# Patient Record
Sex: FEMALE | Race: WHITE | NOT HISPANIC OR LATINO | Employment: OTHER | URBAN - METROPOLITAN AREA
[De-identification: names, ages, dates, MRNs, and addresses within clinical notes are randomized per-mention and may not be internally consistent; named-entity substitution may affect disease eponyms.]

---

## 2017-01-03 ENCOUNTER — CARE COORDINATION (OUTPATIENT)
Dept: PULMONOLOGY | Facility: CLINIC | Age: 72
End: 2017-01-03

## 2017-01-03 NOTE — PROGRESS NOTES
Telephone Encounter: Incoming    Reason for Call: Has declined transplant and is starting hospice. Wondering if she can just stop [employer], having side effects.     Nursing Action/Patient Instruction: Discussed with Dr Cruz that stated okay to stop, no taper needed. Informed patient.    Patient Response/Questions/Concerns: Agreed.

## 2017-01-13 ENCOUNTER — CARE COORDINATION (OUTPATIENT)
Dept: CARE COORDINATION | Facility: CLINIC | Age: 72
End: 2017-01-13

## 2017-01-13 NOTE — PROGRESS NOTES
Clinic Care Coordination Contact    Chart review for care coordination status update     Per chart review [facility] closed clinic care coordination on 7/6/16 - patient is being followed by care coordination at pulmonology     Doc flowsheet updated     Lavern Cho Care Coordinator RN  [facility]  [phone]

## 2017-07-01 ENCOUNTER — HEALTH MAINTENANCE LETTER (OUTPATIENT)
Age: 72
End: 2017-07-01

## 2017-07-11 ENCOUNTER — TELEPHONE (OUTPATIENT)
Dept: PEDIATRICS | Facility: CLINIC | Age: 72
End: 2017-07-11

## 2017-07-11 NOTE — TELEPHONE ENCOUNTER
Patient calls.  Interested in parking sticker and asking how to get one-advised we will leave the form at the FD for patient to come and fill out-form at the FD, will need PCP to complete after patient does her portion.  Amy Rajput RN  Message handled by Nurse Triage.

## 2017-07-13 ENCOUNTER — TRANSFERRED RECORDS (OUTPATIENT)
Dept: HEALTH INFORMATION MANAGEMENT | Facility: CLINIC | Age: 72
End: 2017-07-13

## 2017-07-18 NOTE — TELEPHONE ENCOUNTER
Forms completed, a copy sent to the patient, original sent to the state, and a copy abstracted into the chart.    Kiara Glynn MA

## 2017-08-04 ENCOUNTER — TRANSFERRED RECORDS (OUTPATIENT)
Dept: HEALTH INFORMATION MANAGEMENT | Facility: CLINIC | Age: 72
End: 2017-08-04

## 2017-10-17 ENCOUNTER — ALLIED HEALTH/NURSE VISIT (OUTPATIENT)
Dept: NURSING | Facility: CLINIC | Age: 72
End: 2017-10-17
Payer: COMMERCIAL

## 2017-10-17 DIAGNOSIS — Z23 NEED FOR PROPHYLACTIC VACCINATION AND INOCULATION AGAINST INFLUENZA: Primary | ICD-10-CM

## 2017-10-17 PROCEDURE — 99207 ZZC NO CHARGE NURSE ONLY: CPT

## 2017-10-17 PROCEDURE — G0008 ADMIN INFLUENZA VIRUS VAC: HCPCS | Mod: GW

## 2017-10-17 PROCEDURE — 90662 IIV NO PRSV INCREASED AG IM: CPT | Mod: GW

## 2017-10-17 NOTE — MR AVS SNAPSHOT
After Visit Summary   10/17/2017    Wanda Kaur    MRN: [id number]           Patient Information     Date Of Birth          1945        Visit Information        Provider Department      10/17/2017 1:00 PM [facility] FLU CLINIC NURSE [facility]        Today's Diagnoses     Need for prophylactic vaccination and inoculation against influenza    -  1       Follow-ups after your visit        Who to contact     If you have questions or need follow up information about today's clinic visit or your schedule please contact [facility] directly at [phone].  Normal or non-critical lab and imaging results will be communicated to you by iLivehart, letter or phone within 4 business days after the clinic has received the results. If you do not hear from us within 7 days, please contact the clinic through DiBcom or phone. If you have a critical or abnormal lab result, we will notify you by phone as soon as possible.  Submit refill requests through DiBcom or call your pharmacy and they will forward the refill request to us. Please allow 3 business days for your refill to be completed.          Additional Information About Your Visit        MyChart Information     DiBcom gives you secure access to your electronic health record. If you see a primary care provider, you can also send messages to your care team and make appointments. If you have questions, please call your primary care clinic.  If you do not have a primary care provider, please call [phone] and they will assist you.        Care EveryWhere ID     This is your Care EveryWhere ID. This could be used by other organizations to access your [place] medical records  [id number]         Blood Pressure from Last 3 Encounters:   12/23/16 126/64   11/23/16 118/56   11/10/16 128/81    Weight from Last 3 Encounters:   12/23/16 158 lb 9.6 oz (71.9 kg)   12/08/16 154 lb (69.9 kg)   11/29/16 154 lb (69.9 kg)              We  Performed the Following     ADMIN INFLUENZA (For MEDICARE Patients ONLY) []     FLU VACCINE, INCREASED ANTIGEN, PRESV FREE, AGE 65+ [89134]          Today's Medication Changes          These changes are accurate as of: 10/17/17  1:05 PM.  If you have any questions, ask your nurse or doctor.               These medicines have changed or have updated prescriptions.        Dose/Directions    furosemide 20 MG tablet   Commonly known as:  LASIX   This may have changed:    - when to take this  - reasons to take this   Used for:  Bilateral edema of lower extremity        Dose:  20 mg   Take 1 tablet (20 mg) by mouth daily   Quantity:  90 tablet   Refills:  3                Primary Care Provider Office Phone # Fax #    Oscar Delatorre [phone] [address] [place] [address]        Equal Access to Services     [facility] : Irma Mcdowell, waaxda luqadaha, qaybta kaalmada eddyyakhadra, melvin jones . So [facility] [phone].    ATENCIÓN: Si habla español, tiene a hyde disposición servicios gratuitos de asistencia lingüística. Llame al [phone].    We comply with applicable federal civil rights laws and [place] laws. We do not discriminate on the basis of race, color, national origin, age, disability, sex, sexual orientation, or gender identity.            Thank you!     Thank you for choosing [facility]  for your care. Our goal is always to provide you with excellent care. Hearing back from our patients is one way we can continue to improve our services. Please take a few minutes to complete the written survey that you may receive in the mail after your visit with us. Thank you!             Your Updated Medication List - Protect others around you: Learn how to safely use, store and throw away your medicines at www.disposemymeds.org.          This list is accurate as of: 10/17/17  1:05 PM.  Always use your most recent med  list.                   Brand Name Dispense Instructions for use Diagnosis    albuterol 108 (90 BASE) MCG/ACT Inhaler    PROAIR HFA/PROVENTIL HFA/VENTOLIN HFA    1 Inhaler    Inhale 2 puffs into the lungs every 6 hours as needed for shortness of breath / dyspnea or wheezing    ILD (interstitial lung disease) (H)       benzonatate 200 MG capsule    TESSALON    90 capsule    Take 1 capsule (200 mg) by mouth 3 times daily as needed for cough    Cough       cetirizine 10 MG tablet    zyrTEC     Take 10 mg by mouth daily        CYANOCOBALAMIN SL      Place under the tongue daily        furosemide 20 MG tablet    LASIX    90 tablet    Take 1 tablet (20 mg) by mouth daily    Bilateral edema of lower extremity       GABAPENTIN PO           hydrocortisone 2.5 % cream    ANUSOL-HC    30 g    Place rectally 2 times daily    External hemorrhoids       HYDROmorphone (STANDARD CONC) 1 MG/ML Liqd liquid    DILAUDID    180 mL    Take 1-2 mLs (1-2 mg) by mouth every 3 hours as needed for other (dyspnea)    ILD (interstitial lung disease) (H), Acute respiratory failure with hypoxia (H)       ibuprofen 200 MG tablet    ADVIL/MOTRIN     Take 400 mg by mouth every 4 hours as needed for mild pain        ipratropium - albuterol 0.5 mg/2.5 mg/3 mL 0.5-2.5 (3) MG/3ML neb solution    DUONEB    90 vial    Take 1 vial (3 mLs) by nebulization every 4 hours as needed for shortness of breath / dyspnea or wheezing    Mild intermittent asthma with acute exacerbation       mycophenolate 500 MG tablet    GENERIC EQUIVALENT    180 tablet    TAKE 3 TABLETS(1500 MG) BY MOUTH TWICE DAILY    ILD (interstitial lung disease) (H)       nebulizer/tubing/mouthpiece Kit     1 kit    Dispense all necessary supplies for nebulizer machine    Mild intermittent asthma       ondansetron 4 MG ODT tab    ZOFRAN ODT    20 tablet    Take 1-2 tablets (4-8 mg) by mouth every 8 hours as needed for nausea    Nausea       order for DME     1 Device    Please provide patient  with liquid oxygen with home fill system.  Patient requires 6 liters oxygen per minute via nasal canula with activity.  Please provide patient with oximyzer. This is for lifetime use.    ILD (interstitial lung disease) (H)       oseltamivir 75 MG capsule    TAMIFLU    10 capsule    Take 1 capsule (75 mg) by mouth daily    ILD (interstitial lung disease) (H), Immunosuppression (H)       predniSONE 1 MG tablet    DELTASONE    120 tablet    Take 4 tablets daily with 10 mg for total of 14 mg daily for 2 weeks, then 13 mg daily for 2 weeks, then 12 mg daily for 2 weeks, 11 mg daily for 2 weeks, then 10 mg daily.    ILD (interstitial lung disease) (H)       ranitidine 150 MG tablet    ZANTAC    60 tablet    Take 1 tablet (150 mg) by mouth 2 times daily    GERD (gastroesophageal reflux disease)       RiTUXimab 500 MG/50ML Soln       ILD (interstitial lung disease) (H)       sulfamethoxazole-trimethoprim 400-80 MG per tablet    BACTRIM/SEPTRA    90 tablet    Take 1 tablet by mouth daily    Need for pneumocystis prophylaxis       VITAMIN D-400 PO      Takes 2 gummies daily - 800 units

## 2018-03-12 ENCOUNTER — TRANSFERRED RECORDS (OUTPATIENT)
Dept: HEALTH INFORMATION MANAGEMENT | Facility: CLINIC | Age: 73
End: 2018-03-12

## 2018-03-13 ENCOUNTER — THERAPY VISIT (OUTPATIENT)
Dept: PHYSICAL THERAPY | Facility: CLINIC | Age: 73
End: 2018-03-13
Payer: COMMERCIAL

## 2018-03-13 DIAGNOSIS — M54.12 CERVICAL RADICULOPATHY: Primary | ICD-10-CM

## 2018-03-13 DIAGNOSIS — M54.2 CERVICAL PAIN: ICD-10-CM

## 2018-03-13 PROCEDURE — 97162 PT EVAL MOD COMPLEX 30 MIN: CPT | Mod: GP | Performed by: PHYSICAL THERAPIST

## 2018-03-13 PROCEDURE — G8982 BODY POS GOAL STATUS: HCPCS | Mod: GP | Performed by: PHYSICAL THERAPIST

## 2018-03-13 PROCEDURE — 97110 THERAPEUTIC EXERCISES: CPT | Mod: GP | Performed by: PHYSICAL THERAPIST

## 2018-03-13 PROCEDURE — G8981 BODY POS CURRENT STATUS: HCPCS | Mod: GP | Performed by: PHYSICAL THERAPIST

## 2018-03-13 NOTE — PROGRESS NOTES
[facility] [facility] Initial Evaluation  Subjective:  HPI                    Objective:  System    Physical Exam    [facility]: Physical Therapy Initial Evaluation   Mar 13, 2018  Therapist Impression: Ella is a 72-year old female who presents to physical therapy with a primary complaint of pain in the right upper back/shoulder blade that radiates down the back of her arm all the way to her pinky finger. She also recently notes weakness in the right hand - specifically with pinching. She also reports a correlation between symptoms and sitting in her recliner chair which she feels pushes her head forward. Clinical findings include loss of cervical ROM, poor posture, weakness of the right upper extremity including with  strength in her right (dominant) hand, and tenderness of the cervical musculature that reproduced symptoms in the right upper extremity on palpation. Differential diagnoses at this time include cervical radiculopathy, thoracic outlet syndrome, and T4 syndrome. Ella would benefit from physical therapy to address postural deficits, reduce pain, and restore strength of the right upper extremity.     Subjective:   Chief Complaint:    Pain: Start in the right shoulder blade region and goes down the back/outside of the right arm all the way into the pinky   Numbness/Tingling: Maybe a little bit in the pinky finger   Weakness: in the right hand   Stiffness: None  New/Recurrent/Chronic: New  DOI/onset: Back pain started a couple weeks ago; Pain down the arm started last Friday   Referral Date: 3/12/2018  Mechanism of onset: Nothing in particular, maybe from helping change the bed linen  PMH/surgical history/trauma:    - Pulmonary Fibrosis (past 2 years, O2 dependent)   - asthma   - rheumatoid arthritis (in the hands)   - Fusion C6/7 (about 12 years ago)    - Bilateral total shoulder replacements  General health as reported by patient: Fair    Medications:  anti-inflammatory, pain, albuterol inhaler  Occupation: Retired RN Job duties: prolonged sitting,   Previous Treatment (Effect): Gabapentin (just started yesterday), ibuprofen (helps)  Imaging: X-ray: Irritated below C6/7  AM/PM: same all day  Quality of Pain: deep ache, burning  Pain: 4/10 at present, 4/10 at best, 9/10 at worst  Better: Holding the arm in the right way, medications, pressure on her back,   Worse: extension of the neck, reaching,   Progression of Symptoms since onset: getting worse  Sleeping: This morning was the first morning it woke her up a few hours early.    Other current functional challenges: holding/gripping, reaching, looking up  Current Functional Status: gripping - weakening , writing is hard, can't butter toast ; reaching - can't reach due to pain (forward, upwards); looking up - pain with neck extension    Previous Functional Status: No restrictions previosuly  Current HEP/exercise regimen: Pretty restricted with the oxygen, tries to walk a bit  Hand/Leg Dominance: right-handed  Transportation: Hasn't driven since this started, but doesn't drive much  Live with Others:  at home and is able to help  Red Flags:   - Patient denies the following: Fever ; Change in Bowel or Bladder ; Chest Pain ; Unexplained Weight Loss ;   - Patient reports the following: Pain with Cough / Sneeze / Laughing (in the back and shooting down the arm) ; Night Pain ; Weakness ; Numbness/Tingling ;     Patient's Goal(s): Get rid of this pain    Objective:    Posture: Forward head posutre, rounded shoulders    Neurological:    Motor Deficit:  Myotomes R L   C5 (shoulder abduction) 5 5   C6 (elbow flexion) 5 5   C7 (elbow extension) 4 5   C8 (thumb extension) 3 5   T1 (finger add/abd) 4 5     Sensory Deficit: Sensation intact and symmetrical  Reflexes: Biceps 2+ bilat ;  Triceps R 0+, L 2+ ; Brachioradialis 2+ bilat  Dural Signs: Negative Arango's     Strength (lb) R L   Attempt #1 25 40   Attempt  #2 25 32   Attempt #3 25 30   Average 25 34         AROM: (Major, Moderate, Minimal or Nil loss)  Movement Loss Denny Mod Min Nil Pain   Flexion    x    Extension x    CC   Left Rotation x x      Right Rotation  x      Left Side Bending  x      Right Side bending  x      Retraction  x x         Other:   - Tenderness with symptoms down the right arm during palpation of the right upper trapezius/scalene region.   - Symptoms improved with mobilization of the first rib.       Assessment/Plan:    Patient is a 72 year old female with cervical, thoracic and right upper extremity complaints.    Patient has the following significant findings with corresponding treatment plan.                Referring Diagnosis 1: Cervical spine pain, right cervical radiculopathy   Pain -  hot/cold therapy, manual therapy, splint/taping/bracing/orthotics, self management, education, directional preference exercise and home program  Decreased ROM/flexibility - manual therapy, therapeutic exercise, therapeutic activity and home program  Decreased joint mobility - manual therapy, therapeutic exercise, therapeutic activity and home program  Decreased strength - therapeutic exercise, therapeutic activities and home program  Decreased function - therapeutic activities and home program  Impaired posture - neuro re-education, therapeutic activities and home program    Therapy Evaluation Codes:   1) History comprised of:   Personal factors that impact the plan of care:      Past/current experiences.    Comorbidity factors that impact the plan of care are:      Rheumatoid arthritis and Pulmonary Condition (O2 depoendent), Bilateral Shoulder Replacement, and Cervical Fusion.     Medications impacting care: Anti-inflammatory and Pain.  2) Examination of Body Systems comprised of:   Body structures and functions that impact the plan of care:      Cervical spine, Shoulder, Thoracic Spine and right upper extremity.   Activity limitations that impact the plan  of care are:      Grasping, Sleeping and Reaching and Looking Up.  3) Clinical presentation characteristics are:   Evolving/Changing.  4) Decision-Making    Moderate complexity using standardized patient assessment instrument and/or measureable assessment of functional outcome.  Cumulative Therapy Evaluation is: Moderate complexity.    Previous and current functional limitations:  (See Goal Flow Sheet for this information)    Short term and Long term goals: (See Goal Flow Sheet for this information)     Communication ability:  Patient appears to be able to clearly communicate and understand verbal and written communication and follow directions correctly.  Treatment Explanation - The following has been discussed with the patient:   RX ordered/plan of care  Anticipated outcomes  Possible risks and side effects  This patient would benefit from PT intervention to resume normal activities.   Rehab potential is fair.    Frequency:  1 X week, once daily  Duration:  for 4 weeks tapering to 2 X a month over 4 weeks  Discharge Plan:  Achieve all LTG.  Independent in home treatment program.  Reach maximal therapeutic benefit.    Please refer to the daily flowsheet for treatment today, total treatment time and time spent performing 1:1 timed codes.

## 2018-03-13 NOTE — LETTER
NAYELY IRENE ZHANE PT  [address] [address]   [address]  [place] [address]  [phone]    2018    Re: Wanda Kaur   :   1945  MRN:  [id number]   REFERRING PHYSICIAN:   Sesar IRENE BURNSIVON PT    Date of Initial Evaluation:  3/13/2018  Visits:  Rxs Used: 1  Reason for Referral:     Cervical radiculopathy  Cervical pain    [place] [facility]: Physical Therapy Initial Evaluation   Mar 13, 2018  Therapist Impression: Ella is a 72-year old female who presents to physical therapy with a primary complaint of pain in the right upper back/shoulder blade that radiates down the back of her arm all the way to her pinky finger. She also recently notes weakness in the right hand - specifically with pinching. She also reports a correlation between symptoms and sitting in her recliner chair which she feels pushes her head forward. Clinical findings include loss of cervical ROM, poor posture, weakness of the right upper extremity including with  strength in her right (dominant) hand, and tenderness of the cervical musculature that reproduced symptoms in the right upper extremity on palpation. Differential diagnoses at this time include cervical radiculopathy, thoracic outlet syndrome, and T4 syndrome. Ella would benefit from physical therapy to address postural deficits, reduce pain, and restore strength of the right upper extremity.     Subjective:   Chief Complaint:    Pain: Start in the right shoulder blade region and goes down the back/outside of the right arm all the way into the pinky   Numbness/Tingling: Maybe a little bit in the pinky finger   Weakness: in the right hand   Stiffness: None  New/Recurrent/Chronic: New  DOI/onset: Back pain started a couple weeks ago; Pain down the arm started last Friday   Referral Date: 3/12/2018  Mechanism of onset: Nothing in particular, maybe from helping change the bed linen    Re: Wanda Kaur   :    1945  PMH/surgical history/trauma:    - Pulmonary Fibrosis (past 2 years, O2 dependent)   - asthma   - rheumatoid arthritis (in the hands)   - Fusion C6/7 (about 12 years ago)    - Bilateral total shoulder replacements  General health as reported by patient: Fair    Medications: anti-inflammatory, pain, albuterol inhaler  Occupation: Retired RN Job duties: prolonged sitting,   Previous Treatment (Effect): Gabapentin (just started yesterday), ibuprofen (helps)  Imaging: X-ray: Irritated below C6/7  AM/PM: same all day  Quality of Pain: deep ache, burning  Pain: 4/10 at present, 4/10 at best, 9/10 at worst  Better: Holding the arm in the right way, medications, pressure on her back,   Worse: extension of the neck, reaching,   Progression of Symptoms since onset: getting worse  Sleeping: This morning was the first morning it woke her up a few hours early.    Other current functional challenges: holding/gripping, reaching, looking up  Current Functional Status: gripping - weakening , writing is hard, can't butter toast ; reaching - can't reach due to pain (forward, upwards); looking up - pain with neck extension    Previous Functional Status: No restrictions previosuly  Current HEP/exercise regimen: Pretty restricted with the oxygen, tries to walk a bit  Hand/Leg Dominance: right-handed  Transportation: Hasn't driven since this started, but doesn't drive much  Live with Others:  at home and is able to help  Red Flags:   - Patient denies the following: Fever ; Change in Bowel or Bladder ; Chest Pain ; Unexplained Weight Loss ;   - Patient reports the following: Pain with Cough / Sneeze / Laughing (in the back and shooting down the arm) ; Night Pain ; Weakness ; Numbness/Tingling ;     Patient's Goal(s): Get rid of this pain    Re: Wanda Kaur   :   1945    Objective:    Posture: Forward head posutre, rounded shoulders    Neurological:    Motor Deficit:  Myotomes R L   C5 (shoulder  abduction) 5 5   C6 (elbow flexion) 5 5   C7 (elbow extension) 4 5   C8 (thumb extension) 3 5   T1 (finger add/abd) 4 5     Sensory Deficit: Sensation intact and symmetrical  Reflexes: Biceps 2+ bilat ;  Triceps R 0+, L 2+ ; Brachioradialis 2+ bilat  Dural Signs: Negative Arango's     Strength (lb) R L   Attempt #1 25 40   Attempt #2 25 32   Attempt #3 25 30   Average 25 34         AROM: (Major, Moderate, Minimal or Nil loss)  Movement Loss Denny Mod Min Nil Pain   Flexion    x    Extension x    CC   Left Rotation x x      Right Rotation  x      Left Side Bending  x      Right Side bending  x      Retraction  x x       Other:   - Tenderness with symptoms down the right arm during palpation of the right upper trapezius/scalene region.   - Symptoms improved with mobilization of the first rib.     Assessment/Plan:    Patient is a 72 year old female with cervical, thoracic and right upper extremity complaints.    Patient has the following significant findings with corresponding treatment plan.                Referring Diagnosis 1: Cervical spine pain, right cervical radiculopathy   Pain -  hot/cold therapy, manual therapy, splint/taping/bracing/orthotics, self management, education, directional preference exercise and home program  Decreased ROM/flexibility - manual therapy, therapeutic exercise, therapeutic activity and home program  Decreased joint mobility - manual therapy, therapeutic exercise, therapeutic activity and home program  Decreased strength - therapeutic exercise, therapeutic activities and home program  Decreased function - therapeutic activities and home program  Impaired posture - neuro re-education, therapeutic activities and home program  Re: Wanda Kaur   :   1945    Therapy Evaluation Codes:   1) History comprised of:   Personal factors that impact the plan of care:      Past/current experiences.    Comorbidity factors that impact the plan of care are:      Rheumatoid arthritis  and Pulmonary Condition (O2 depoendent), Bilateral Shoulder Replacement, and Cervical Fusion.     Medications impacting care: Anti-inflammatory and Pain.  2) Examination of Body Systems comprised of:   Body structures and functions that impact the plan of care:      Cervical spine, Shoulder, Thoracic Spine and right upper extremity.   Activity limitations that impact the plan of care are:      Grasping, Sleeping and Reaching and Looking Up.  3) Clinical presentation characteristics are:   Evolving/Changing.  4) Decision-Making    Moderate complexity using standardized patient assessment instrument and/or measureable assessment of functional outcome.  Cumulative Therapy Evaluation is: Moderate complexity.    Previous and current functional limitations:  (See Goal Flow Sheet for this information)    Short term and Long term goals: (See Goal Flow Sheet for this information)     Communication ability:  Patient appears to be able to clearly communicate and understand verbal and written communication and follow directions correctly.  Treatment Explanation - The following has been discussed with the patient:   RX ordered/plan of care  Anticipated outcomes  Possible risks and side effects  This patient would benefit from PT intervention to resume normal activities.   Rehab potential is fair.    Frequency:  1 X week, once daily  Duration:  for 4 weeks tapering to 2 X a month over 4 weeks  Discharge Plan:  Achieve all LTG.  Independent in home treatment program.  Reach maximal therapeutic benefit.    Thank you for your referral.    INQUIRIES  Therapist: SONY Durham [facility] PT  [address]   [address] [place] [id number]  Phone: [phone]  Fax: [phone]

## 2018-03-15 ENCOUNTER — TRANSFERRED RECORDS (OUTPATIENT)
Dept: HEALTH INFORMATION MANAGEMENT | Facility: CLINIC | Age: 73
End: 2018-03-15

## 2018-03-20 ENCOUNTER — THERAPY VISIT (OUTPATIENT)
Dept: PHYSICAL THERAPY | Facility: CLINIC | Age: 73
End: 2018-03-20
Payer: COMMERCIAL

## 2018-03-20 DIAGNOSIS — M54.12 CERVICAL RADICULOPATHY: ICD-10-CM

## 2018-03-20 DIAGNOSIS — M54.2 CERVICAL PAIN: ICD-10-CM

## 2018-03-20 PROCEDURE — 97140 MANUAL THERAPY 1/> REGIONS: CPT | Mod: GP | Performed by: PHYSICAL THERAPIST

## 2018-03-20 PROCEDURE — 97110 THERAPEUTIC EXERCISES: CPT | Mod: GP | Performed by: PHYSICAL THERAPIST

## 2018-03-20 NOTE — PROGRESS NOTES
At start of treatment     Strength (lb) R L   Attempt #1 15    Attempt #2 15    Attempt #3 15    Average 15        After Treatment     Strength (lb) R L   Attempt #1 21    Attempt #2 21    Attempt #3 22    Average 21.7

## 2018-03-23 ENCOUNTER — THERAPY VISIT (OUTPATIENT)
Dept: PHYSICAL THERAPY | Facility: CLINIC | Age: 73
End: 2018-03-23
Payer: COMMERCIAL

## 2018-03-23 DIAGNOSIS — M54.12 CERVICAL RADICULOPATHY: ICD-10-CM

## 2018-03-23 DIAGNOSIS — M54.2 CERVICAL PAIN: ICD-10-CM

## 2018-03-23 PROCEDURE — 97110 THERAPEUTIC EXERCISES: CPT | Mod: GP | Performed by: PHYSICAL THERAPIST

## 2018-03-23 PROCEDURE — 97140 MANUAL THERAPY 1/> REGIONS: CPT | Mod: GP | Performed by: PHYSICAL THERAPIST

## 2018-03-27 ENCOUNTER — THERAPY VISIT (OUTPATIENT)
Dept: PHYSICAL THERAPY | Facility: CLINIC | Age: 73
End: 2018-03-27
Payer: COMMERCIAL

## 2018-03-27 DIAGNOSIS — M54.12 CERVICAL RADICULOPATHY: ICD-10-CM

## 2018-03-27 DIAGNOSIS — M54.2 CERVICAL PAIN: ICD-10-CM

## 2018-03-27 PROCEDURE — 97110 THERAPEUTIC EXERCISES: CPT | Mod: GP | Performed by: PHYSICAL THERAPIST

## 2018-03-27 PROCEDURE — 97112 NEUROMUSCULAR REEDUCATION: CPT | Mod: GP | Performed by: PHYSICAL THERAPIST

## 2018-03-27 PROCEDURE — 97140 MANUAL THERAPY 1/> REGIONS: CPT | Mod: GP | Performed by: PHYSICAL THERAPIST

## 2018-03-27 NOTE — PROGRESS NOTES
Start of treatment      Strength (lb) R L   Attempt #1 15    Attempt #2 17    Attempt #3 15    Average 15.6        End of treatment   Strength (lb) R L   Attempt #1 19    Attempt #2 19    Attempt #3 20    Average 19.3        Motor Deficit:  Myotomes R L   C5 (shoulder abduction)     C6 (elbow flexion)     C7 (elbow extension) 5/5    C8 (thumb extension) 3/5    T1 (finger add/abd) 3/5 at digits 4-5

## 2018-03-28 NOTE — PROGRESS NOTES
Start of treatment     Strength (lb) R L   Attempt #1 15    Attempt #2 21    Attempt #3 22    Average 19.3        End of treatment     Strength (lb) R L   Attempt #1 21    Attempt #2 21    Attempt #3 23    Average 21.6      Small improvement over  strength at last session.

## 2018-03-30 ENCOUNTER — THERAPY VISIT (OUTPATIENT)
Dept: PHYSICAL THERAPY | Facility: CLINIC | Age: 73
End: 2018-03-30
Payer: MEDICARE

## 2018-03-30 DIAGNOSIS — M54.2 CERVICAL PAIN: ICD-10-CM

## 2018-03-30 DIAGNOSIS — M54.12 CERVICAL RADICULOPATHY: ICD-10-CM

## 2018-03-30 PROCEDURE — 97140 MANUAL THERAPY 1/> REGIONS: CPT | Mod: GP | Performed by: PHYSICAL THERAPIST

## 2018-03-30 PROCEDURE — 97112 NEUROMUSCULAR REEDUCATION: CPT | Mod: GP | Performed by: PHYSICAL THERAPIST

## 2018-03-30 NOTE — LETTER
NAYELY IRENE ZHANE PT  [address] [address]  [address]  [facility] [address]  [phone]    2018    Re: Wanda Kaur   :   1945  MRN:  [id number]   REFERRING PHYSICIAN:   Sesar IRENE ZHANE PT    Date of Initial Evaluation:  3/13/2018  Visits:  Rxs Used: 5  Reason for Referral:     Cervical radiculopathy  Cervical pain    PROGRESS  REPORT    Progress reporting period is from 3/13/2018 to 3/30/2018.       SUBJECTIVE  Subjective changes noted by patient: Feeling better. Mild discomfort the last few days. Has a small twinge in the right elbow presently.     Current Pain level: 2/10.     Initial Pain level: 9/10.   Changes in function:  None  Adverse reaction to treatment or activity: None  OBJECTIVE  Changes noted in objective findings:  Yes, Patient had a decrease in  strength that is returning. She also demonstrates improved strength in other myotomes over the course of treatment. The weakness remains primarily in the hands.    Strength (lb) R L   Attempt #1 25    Attempt #2 25    Attempt #3 23    Average 24.3    Elbow Extension on the R: 4+/5  Tension in the upper trap and nasra-scapular musculature.     ASSESSMENT/PLAN  Updated problem list and treatment plan:   Referring Diagnosis 1: Cervical spine pain, right cervical radiculopathy   Pain -  hot/cold therapy, manual therapy, splint/taping/bracing/orthotics, self management, education, directional preference exercise and home program  Decreased ROM/flexibility - manual therapy, therapeutic exercise, therapeutic activity and home program  Decreased joint mobility - manual therapy, therapeutic exercise, therapeutic activity and home program  Decreased strength - therapeutic exercise, therapeutic activities and home program  Decreased function - therapeutic activities and home program  Impaired posture - neuro re-education, therapeutic activities and home program  STG/LTGs have been met or progress has been made  towards goals:  Yes (See Goal flow sheet completed today.)    Re: Wanda Kaur   :   1945    Assessment of Progress: The patient's condition is improving.  The patient's condition has potential to improve.  Self Management Plans:  Patient has been instructed in a home treatment program.  I have re-evaluated this patient and find that the nature, scope, duration and intensity of the therapy is appropriate for the medical condition of the patient.  Wanda continues to require the following intervention to meet STG and LTG's:  PT or other intervention    Recommendations:  Ella has made some small but steady improvements with physical therapy. She continues to have a primary deficit of weakness in the hand.   Defer judgement to Dr. Camejo on best course of action going forward based on patient presentation and reporting on utility of PT compared against other treatment options.   If continued PT is the recommendation, please send new orders.       Thank you for your referral.    INQUIRIES  Therapist: SONY Durham [facility]  [address] [address] [address]  Phone: [phone]  Fax: [phone]

## 2018-04-01 NOTE — PROGRESS NOTES
Subjective:  HPI                    Objective:  System    Physical Exam    General     ROS     Assessment/Plan:    PROGRESS  REPORT    Progress reporting period is from 3/13/2018 to 3/30/2018.       SUBJECTIVE  Subjective changes noted by patient: Feeling better. Mild discomfort the last few days. Has a small twinge in the right elbow presently.     Current Pain level: 2/10.     Initial Pain level: 9/10.   Changes in function:  None  Adverse reaction to treatment or activity: None    OBJECTIVE  Changes noted in objective findings:  Yes, Patient had a decrease in  strength that is returning. She also demonstrates improved strength in other myotomes over the course of treatment. The weakness remains primarily in the hands.      Strength (lb) R L   Attempt #1 25    Attempt #2 25    Attempt #3 23    Average 24.3      Elbow Extension on the R: 4+/5    Tension in the upper trap and nasra-scapular musculature.     ASSESSMENT/PLAN  Updated problem list and treatment plan:   Referring Diagnosis 1: Cervical spine pain, right cervical radiculopathy   Pain -  hot/cold therapy, manual therapy, splint/taping/bracing/orthotics, self management, education, directional preference exercise and home program  Decreased ROM/flexibility - manual therapy, therapeutic exercise, therapeutic activity and home program  Decreased joint mobility - manual therapy, therapeutic exercise, therapeutic activity and home program  Decreased strength - therapeutic exercise, therapeutic activities and home program  Decreased function - therapeutic activities and home program  Impaired posture - neuro re-education, therapeutic activities and home program  STG/LTGs have been met or progress has been made towards goals:  Yes (See Goal flow sheet completed today.)  Assessment of Progress: The patient's condition is improving.  The patient's condition has potential to improve.  Self Management Plans:  Patient has been instructed in a home treatment  program.  I have re-evaluated this patient and find that the nature, scope, duration and intensity of the therapy is appropriate for the medical condition of the patient.  Wanda continues to require the following intervention to meet STG and LTG's:  PT or other intervention    Recommendations:  Ella has made some small but steady improvements with physical therapy. She continues to have a primary deficit of weakness in the hand.   Defer judgement to Dr. Camejo on best course of action going forward based on patient presentation and reporting on utility of PT compared against other treatment options.   If continued PT is the recommendation, please send new orders.     Please refer to the daily flowsheet for treatment today, total treatment time and time spent performing 1:1 timed codes.

## 2018-04-03 ENCOUNTER — TRANSFERRED RECORDS (OUTPATIENT)
Dept: HEALTH INFORMATION MANAGEMENT | Facility: CLINIC | Age: 73
End: 2018-04-03

## 2018-04-04 ENCOUNTER — TELEPHONE (OUTPATIENT)
Dept: PEDIATRICS | Facility: CLINIC | Age: 73
End: 2018-04-04

## 2018-04-04 ENCOUNTER — RADIANT APPOINTMENT (OUTPATIENT)
Dept: GENERAL RADIOLOGY | Facility: CLINIC | Age: 73
End: 2018-04-04
Attending: INTERNAL MEDICINE
Payer: MEDICARE

## 2018-04-04 ENCOUNTER — OFFICE VISIT (OUTPATIENT)
Dept: PEDIATRICS | Facility: CLINIC | Age: 73
End: 2018-04-04
Payer: COMMERCIAL

## 2018-04-04 VITALS
BODY MASS INDEX: 29.43 KG/M2 | SYSTOLIC BLOOD PRESSURE: 122 MMHG | HEART RATE: 98 BPM | DIASTOLIC BLOOD PRESSURE: 58 MMHG | TEMPERATURE: 98.1 F | OXYGEN SATURATION: 96 % | WEIGHT: 146 LBS | HEIGHT: 59 IN

## 2018-04-04 DIAGNOSIS — J84.9 ILD (INTERSTITIAL LUNG DISEASE) (H): ICD-10-CM

## 2018-04-04 DIAGNOSIS — Z01.818 PREOP GENERAL PHYSICAL EXAM: Primary | ICD-10-CM

## 2018-04-04 DIAGNOSIS — M54.12 CERVICAL RADICULOPATHY: ICD-10-CM

## 2018-04-04 LAB
BASOPHILS # BLD AUTO: 0 10E9/L (ref 0–0.2)
BASOPHILS NFR BLD AUTO: 0.5 %
DIFFERENTIAL METHOD BLD: ABNORMAL
EOSINOPHIL # BLD AUTO: 0.2 10E9/L (ref 0–0.7)
EOSINOPHIL NFR BLD AUTO: 2.8 %
ERYTHROCYTE [DISTWIDTH] IN BLOOD BY AUTOMATED COUNT: 12.3 % (ref 10–15)
FEF 25/75: NORMAL
FEV-1: NORMAL
FEV1/FVC: NORMAL
FVC: NORMAL
HCT VFR BLD AUTO: 35 % (ref 35–47)
HGB BLD-MCNC: 11 G/DL (ref 11.7–15.7)
LYMPHOCYTES # BLD AUTO: 0.7 10E9/L (ref 0.8–5.3)
LYMPHOCYTES NFR BLD AUTO: 10.8 %
MCH RBC QN AUTO: 31.7 PG (ref 26.5–33)
MCHC RBC AUTO-ENTMCNC: 31.4 G/DL (ref 31.5–36.5)
MCV RBC AUTO: 101 FL (ref 78–100)
MONOCYTES # BLD AUTO: 0.5 10E9/L (ref 0–1.3)
MONOCYTES NFR BLD AUTO: 7.5 %
NEUTROPHILS # BLD AUTO: 5.1 10E9/L (ref 1.6–8.3)
NEUTROPHILS NFR BLD AUTO: 78.4 %
PLATELET # BLD AUTO: 291 10E9/L (ref 150–450)
RBC # BLD AUTO: 3.47 10E12/L (ref 3.8–5.2)
WBC # BLD AUTO: 6.5 10E9/L (ref 4–11)

## 2018-04-04 PROCEDURE — 71046 X-RAY EXAM CHEST 2 VIEWS: CPT | Mod: GV

## 2018-04-04 PROCEDURE — 94010 BREATHING CAPACITY TEST: CPT | Mod: GV | Performed by: PATHOLOGY

## 2018-04-04 PROCEDURE — 36415 COLL VENOUS BLD VENIPUNCTURE: CPT | Mod: GV | Performed by: INTERNAL MEDICINE

## 2018-04-04 PROCEDURE — 99215 OFFICE O/P EST HI 40 MIN: CPT | Mod: 25 | Performed by: PATHOLOGY

## 2018-04-04 PROCEDURE — 93000 ELECTROCARDIOGRAM COMPLETE: CPT | Mod: GV | Performed by: PATHOLOGY

## 2018-04-04 PROCEDURE — 85025 COMPLETE CBC W/AUTO DIFF WBC: CPT | Mod: GV | Performed by: PATHOLOGY

## 2018-04-04 PROCEDURE — 80053 COMPREHEN METABOLIC PANEL: CPT | Mod: GV | Performed by: PATHOLOGY

## 2018-04-04 RX ORDER — GABAPENTIN 300 MG/1
600 CAPSULE ORAL 2 TIMES DAILY
COMMUNITY
Start: 2018-04-04 | End: 2018-05-02

## 2018-04-04 NOTE — TELEPHONE ENCOUNTER
After huddling with , called pt back & offered an appointment with him for pre-op(complicated hx) either today or tomorrow. Pt would like to come in today, scheduled that appointment.    Matti RN  Triage Nurse

## 2018-04-04 NOTE — PROGRESS NOTES
[facility]  [address]  [address]  [place] [address] [phone]  Dept: [phone]    PRE-OP EVALUATION:  Today's date: 2018    Wanda Kaur (: 1945) presents for pre-operative evaluation assessment as requested by Dr. Camejo.  She requires evaluation and anesthesia risk assessment prior to undergoing surgery/procedure for treatment of neck .    Fax number for surgical facility: [facility]   Primary Physician: sOcar Delatorre  Type of Anesthesia Anticipated: General    Patient has a Health Care Directive or Living Will:  YES     Preop Questions 2018   Who is doing your surgery? dr yokasta camejo   What are you having done? cervical neck for impinged nerve   Date of Surgery/Procedure: 18   1.  Do you have a history of Heart attack, stroke, stent, coronary bypass surgery, or other heart surgery? No   2.  Do you ever have any pain or discomfort in your chest? No   3.  Do you have a history of  Heart Failure? No   4.   Are you troubled by shortness of breath when:  walking on a level surface, or up a slight hill, or at night? YES - has interstitial lung disease   5.  Do you currently have a cold, bronchitis or other respiratory infection? No   6.  Do you have a cough, shortness of breath, or wheezing? YES - see below   7.  Do you sometimes get pains in the calves of your legs when you walk? No   8. Do you or anyone in your family have previous history of blood clots? No   9.  Do you or does anyone in your family have a serious bleeding problem such as prolonged bleeding following surgeries or cuts? No   10. Have you ever had problems with anemia or been told to take iron pills? No   11. Have you had any abnormal blood loss such as black, tarry or bloody stools, or abnormal vaginal bleeding? No   12. Have you ever had a blood transfusion? YES -    13. Have you or any of your relatives ever had problems with anesthesia? No   14. Do you have  sleep apnea, excessive snoring or daytime drowsiness? No   15. Do you have any prosthetic heart valves? No   16. Do you have prosthetic joints? YES - shoulders   17. Is there any chance that you may be pregnant? No         HPI:     HPI related to upcoming procedure: Ella is a 72 yr old female with history of intersitial lung disease, cervical radiculopathy, rheumatoid arthritis who presents for pre-operative exam prior to cervical foraminotomy. She has been having numbness and weakness in her right hand.    ILD: she has had interestitial lung disease since 2016. She has bee on 4 L of oxygen therapy at home. Oxygen will drop down to mid 80'% with exertion on 3L but will stay above 90% on 4L. She feels that her breathing has been stable. No increased wheezing or shortness of breath. Will occasionally use morphine if having shortness of breath. Uses albuterol 2-3 times per day with some help. Had been evaluated by pulmonology and treated with rituximab and cell cept. She has been off all medications and feels that things are stable.     She is not on immunosuppression therapy for rheumatoid arthritis    MEDICAL HISTORY:     Patient Active Problem List    Diagnosis Date Noted     Cervical radiculopathy 03/13/2018     Priority: Medium     Cervical pain 03/13/2018     Priority: Medium     Status post coronary angiogram 09/14/2016     Priority: Medium     Abnormal CT of the chest 09/06/2016     Priority: Medium     Sick-euthyroid syndrome 08/03/2016     Priority: Medium     Anemia, unspecified type 08/03/2016     Priority: Medium     ILD (interstitial lung disease) (H) 07/08/2016     Priority: Medium     Acute respiratory failure with hypoxia (H) 06/27/2016     Priority: Medium     Pneumonia of both lungs due to methicillin resistant Staphylococcus aureus (MRSA), unspecified part of lung (H) 06/21/2016     Priority: Medium     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     Priority: Medium     Health Care Home 06/17/2016      Priority: Medium       Status:  Closed - being followed by pulmonology Care Coordination [facility]   Care Coordinator:  Lavern Cho [phone]  See Letters for HC Care Plan  Date:  July 6, 2016             Hypoxia 06/10/2016     Priority: Medium     Pneumonia of both upper lobes 06/02/2016     Priority: Medium     Rheumatoid arthritis, involving unspecified site, unspecified rheumatoid factor presence (H) 05/09/2016     Priority: Medium     Immunosuppression (H) 05/09/2016     Priority: Medium     Neck pain 07/14/2015     Priority: Medium     Enthesopathy of hip region, unspecified laterality 07/08/2015     Priority: Medium     Mediastinal mass 04/24/2014     Priority: Medium     Suspect benign thymoma       Advance Care Planning 11/10/2011     Priority: Medium     Advance Care Planning 11/4/2016: Receipt of ACP document:  Received: Health Care Directive which was witnessed or notarized on 11-25-13.  Document previously scanned on 11-3-16.  Validation form completed and sent to be scanned.  Code Status reflects choices in most recent ACP document-POLST of 8/2/16. Of note patient was evaluating a lung transplant. Note of 10/27 states she is declining; however recommend a goals of care discussion to confirm current choices and POLST/code status of DNR/DNI.  Confirmed/documented designated decision maker(s).  Added by Veronica Ray RN, System Director ACP-Honoring Choices   Advance Care Planning 8/9/2016: Receipt of ACP document:  Received: POLST which was signed and dated by provider on 8/2/16.  Document not previously scanned.  Reviewed for validity as medical order and sent to be scanned.  Code Status reflects choices in most recent ACP document.  Confirmed/documented designated decision maker(s). Copy mailed to patient.  Added by Bella Min  Advance Care Planning 11/10/11 Patient states has Advance Directive and will bring in a copy to clinic . Margaret Kimble CMA         CARDIOVASCULAR SCREENING; LDL  GOAL LESS THAN 160 09/27/2010     Priority: Medium     Osteoarthrosis, hand      Priority: Medium     inflammatory component;;  following with Rheum.  Problem list name updated by automated process. Provider to review       Enthesopathy of hip region 03/15/2005     Priority: Medium     Other symptoms involving cardiovascular system 11/03/2003     Priority: Medium     Right carotid bruit        Past Medical History:   Diagnosis Date     Asthma      Enthesopathy of hip 3/15/2005     Enthesopathy of hip region      Mediastinal mass 4/24/2014    enlarging, presumed benign thymona     Mild intermittent asthma     triggered by URI's     NSIP (nonspecific interstitial pneumonia) (H)      Osteoarthrosis, unspecified whether generalized or localized, hand     inflammatory component;;  following with Rheum.     Other chronic pain      Pain in joint, shoulder region     (hx of impingement both shoulders)     PONV (postoperative nausea and vomiting)      Right carotid bruit 11/3/2003     Sciatica     better with epidural steroid approx 2006     Past Surgical History:   Procedure Laterality Date     ABDOMEN SURGERY  20yrs plus    lap     ARTHROPLASTY SHOULDER  10/2010    right     ARTHROPLASTY SHOULDER  12/28/2011    Procedure:ARTHROPLASTY SHOULDER; Left Total Shoulder Arthroplasty  ; Surgeon:HERBERT LIAO; Location:[facility] OR     BIOPSY  2015    Vats proceedure for thymoma     BREAST SURGERY Left     breast cysts     C NONSPECIFIC PROCEDURE      L breast cysts      CARPAL TUNNEL RELEASE RT/LT      R carpal release     COLONOSCOPY       FUSION CERVICAL ANTERIOR ONE LEVEL      L c6-7 cervical disc with fusion     HC ESOPH/GAS REFLUX TEST W NASAL IMPED >1 HR N/A 9/22/2016    Procedure: ESOPHAGEAL IMPEDENCE FUNCTION TEST WITH 24 HOUR PH GREATER THAN 1 HOUR;  Surgeon: Bret Magdaleno MD;  Location: [facility]     HC LAPAROSCOPY, SURGICAL; CHOLECYSTECTOMY      lap choly 1990's     HEAD & NECK SURGERY       HYSTERECTOMY TOTAL ABDOMINAL,  BILATERAL SALPINGO-OOPHORECTOMY, COMBINED       THORACOSCOPIC WEDGE RESECTION LUNG Right 3/24/2015    Procedure: THORACOSCOPIC WEDGE RESECTION LUNG;  Surgeon: Júnior Oakes MD;  Location:  OR     Current Outpatient Prescriptions   Medication Sig Dispense Refill     mycophenolate (CELLCEPT - GENERIC EQUIVALENT) 500 MG tablet TAKE 3 TABLETS(1500 MG) BY MOUTH TWICE DAILY 180 tablet 3     benzonatate (TESSALON) 200 MG capsule Take 1 capsule (200 mg) by mouth 3 times daily as needed for cough 90 capsule 3     albuterol (PROAIR HFA, PROVENTIL HFA, VENTOLIN HFA) 108 (90 BASE) MCG/ACT inhaler Inhale 2 puffs into the lungs every 6 hours as needed for shortness of breath / dyspnea or wheezing 1 Inhaler 11     ranitidine (ZANTAC) 150 MG tablet Take 1 tablet (150 mg) by mouth 2 times daily 60 tablet 11     predniSONE (DELTASONE) 1 MG tablet Take 4 tablets daily with 10 mg for total of 14 mg daily for 2 weeks, then 13 mg daily for 2 weeks, then 12 mg daily for 2 weeks, 11 mg daily for 2 weeks, then 10 mg daily. 120 tablet 3     GABAPENTIN PO        hydrocortisone (ANUSOL-HC) 2.5 % rectal cream Place rectally 2 times daily 30 g 3     cetirizine (ZYRTEC) 10 MG tablet Take 10 mg by mouth daily       oseltamivir (TAMIFLU) 75 MG capsule Take 1 capsule (75 mg) by mouth daily 10 capsule 0     ondansetron (ZOFRAN ODT) 4 MG disintegrating tablet Take 1-2 tablets (4-8 mg) by mouth every 8 hours as needed for nausea 20 tablet 1     sulfamethoxazole-trimethoprim (BACTRIM,SEPTRA) 400-80 MG per tablet Take 1 tablet by mouth daily 90 tablet 3     HYDROmorphone (DILAUDID) 1 MG/ML LIQD Take 1-2 mLs (1-2 mg) by mouth every 3 hours as needed for other (dyspnea) 180 mL 0     RiTUXimab 500 MG/50ML SOLN        furosemide (LASIX) 20 MG tablet Take 1 tablet (20 mg) by mouth daily (Patient taking differently: Take 20 mg by mouth daily as needed ) 90 tablet 3     ibuprofen (ADVIL,MOTRIN) 200 MG tablet Take 400 mg by mouth every 4 hours as  "needed for mild pain       CYANOCOBALAMIN SL Place under the tongue daily       order for DME Please provide patient with liquid oxygen with home fill system.  Patient requires 6 liters oxygen per minute via nasal canula with activity.  Please provide patient with oximyzer. This is for lifetime use. 1 Device 0     ipratropium - albuterol 0.5 mg/2.5 mg/3 mL (DUONEB) 0.5-2.5 (3) MG/3ML nebulization Take 1 vial (3 mLs) by nebulization every 4 hours as needed for shortness of breath / dyspnea or wheezing 90 vial 3     Respiratory Therapy Supplies (NEBULIZER/TUBING/MOUTHPIECE) KIT Dispense all necessary supplies for nebulizer machine 1 kit 99     Cholecalciferol (VITAMIN D-400 PO) Takes 2 gummies daily - 800 units       OTC products: stopping ibuprofen 4/3    Allergies   Allergen Reactions     No Known Drug Allergies       Latex Allergy: NO    Social History   Substance Use Topics     Smoking status: Never Smoker     Smokeless tobacco: Never Used      Comment: smoked very briefly as a teen     Alcohol use 0.6 oz/week     1 Standard drinks or equivalent per week      Comment: SMALL, 0.5oz/wk     History   Drug Use No       REVIEW OF SYSTEMS:   Constitutional, neuro, ENT, endocrine, pulmonary, cardiac, gastrointestinal, genitourinary, musculoskeletal, integument and psychiatric systems are negative, except as otherwise noted.    EXAM:   /58 (BP Location: Right arm, Patient Position: Chair, Cuff Size: Adult Regular)  Pulse 98  Temp 98.1  F (36.7  C) (Tympanic)  Ht 4' 11\" (1.499 m)  Wt 146 lb (66.2 kg)  SpO2 96%  BMI 29.49 kg/m2   O2 drops to less than 88% with decreasing oxygen flow rate- 97% on 4L      GENERAL APPEARANCE: healthy, alert and no distress     EYES: EOMI, PERRL     HENT: ear canals and TM's normal and nose and mouth without ulcers or lesions     NECK: no adenopathy, no asymmetry, masses, or scars and thyroid normal to palpation     RESP: on 4L oxygen, noted bilateral crackles throughout, fairly " good air movement, easy work of breathing.     CV: regular rates and rhythm, normal S1 S2, no S3 or S4 and no murmur, click or rub     ABDOMEN:  soft, nontender, no HSM or masses and bowel sounds normal     MS: noted weakness in right arm and decreased ROM due to pain     SKIN: no suspicious lesions or rashes     NEURO: Normal strength and tone, sensory exam grossly normal, mentation intact and speech normal     PSYCH: mentation appears normal. and affect normal/bright     LYMPHATICS: No cervical adenopathy    DIAGNOSTICS:   EKG: appears normal, NSR, normal axis, normal intervals, no acute ST/T changes c/w ischemia, no LVH by voltage criteria, unchanged from previous tracings    Results for orders placed or performed in visit on 04/04/18 (from the past 48 hour(s))   Spirometry, Breathing Capacity: Normal Order, Clinic Performed   Result Value Ref Range    FEV-1      FVC      FEV1/FVC      FEF 25/75     CBC with platelets differential   Result Value Ref Range    WBC 6.5 4.0 - 11.0 10e9/L    RBC Count 3.47 (L) 3.8 - 5.2 10e12/L    Hemoglobin 11.0 (L) 11.7 - 15.7 g/dL    Hematocrit 35.0 35.0 - 47.0 %     (H) 78 - 100 fl    MCH 31.7 26.5 - 33.0 pg    MCHC 31.4 (L) 31.5 - 36.5 g/dL    RDW 12.3 10.0 - 15.0 %    Platelet Count 291 150 - 450 10e9/L    Diff Method Automated Method     % Neutrophils 78.4 %    % Lymphocytes 10.8 %    % Monocytes 7.5 %    % Eosinophils 2.8 %    % Basophils 0.5 %    Absolute Neutrophil 5.1 1.6 - 8.3 10e9/L    Absolute Lymphocytes 0.7 (L) 0.8 - 5.3 10e9/L    Absolute Monocytes 0.5 0.0 - 1.3 10e9/L    Absolute Eosinophils 0.2 0.0 - 0.7 10e9/L    Absolute Basophils 0.0 0.0 - 0.2 10e9/L   Comprehensive metabolic panel   Result Value Ref Range    Sodium 141 133 - 144 mmol/L    Potassium 4.2 3.4 - 5.3 mmol/L    Chloride 104 94 - 109 mmol/L    Carbon Dioxide 31 20 - 32 mmol/L    Anion Gap 6 3 - 14 mmol/L    Glucose 99 70 - 99 mg/dL    Urea Nitrogen 16 7 - 30 mg/dL    Creatinine 0.95 0.52 - 1.04  mg/dL    GFR Estimate 58 (L) >60 mL/min/1.7m2    GFR Estimate If Black 70 >60 mL/min/1.7m2    Calcium 9.2 8.5 - 10.1 mg/dL    Bilirubin Total 0.2 0.2 - 1.3 mg/dL    Albumin 3.7 3.4 - 5.0 g/dL    Protein Total 6.7 (L) 6.8 - 8.8 g/dL    Alkaline Phosphatase 72 40 - 150 U/L    ALT 22 0 - 50 U/L    AST 19 0 - 45 U/L     Chest xray:   IMPRESSION: Interstitial densities are scattered throughout both lung  bases fairly symmetrically, unchanged. No acute infiltrates. Mild  cardiomegaly. Bilateral shoulder prostheses. No pleural effusion.    Recent Labs   Lab Test  12/28/16   1440  11/10/16   1614   09/08/16   0806   07/07/16   1242   03/24/15   0615   HGB  9.8*  10.6*   < >  10.0*   < >  12.1   < >  12.2   PLT  247  238   < >  217   < >  131*   < >  188   INR   --    --    --   0.89   --    --    --   0.90   NA  141  140   < >  140   < >  134   < >  145*   POTASSIUM  4.1  4.3   < >  3.6   < >  4.9   < >  4.1   CR  0.94  0.84   < >  0.86   < >  0.75   < >  0.96   A1C   --    --    --   5.5   --   6.1*   --    --     < > = values in this interval not displayed.        IMPRESSION:   Reason for surgery/procedure: Cervical radiculopathy  Diagnosis/reason for consult: Pre-operative clearance    The proposed surgical procedure is considered INTERMEDIATE risk.    REVISED CARDIAC RISK INDEX  The patient has the following serious cardiovascular risks for perioperative complications such as (MI, PE, VFib and 3  AV Block):  Interstitial Lung disease on chronic oxygen    INTERPRETATION: Moderate risk from pulmonary disease    The patient has the following additional risks for perioperative complications:  Oxygen dependent lung disease      ICD-10-CM    1. Preop general physical exam Z01.818 EKG 12-lead complete w/read - Clinics   2. ILD (interstitial lung disease) (H) J84.9 Spirometry, Breathing Capacity: Normal Order, Clinic Performed     XR Chest 2 Views     CBC with platelets differential     Comprehensive metabolic panel   3.  Cervical radiculopathy M54.12        RECOMMENDATIONS:       Pulmonary Risk  Incentive spirometry post op  Respiratory Therapy (Respiratory Care IP Consult)  post op  Minimize chris trauma during surgery and monitor end tidal CO2  - She will bring with albuterol to surgery  - discussed with pulmonology and noted that they could not comment on optimization without visit with patient. Discussed with patient and would like to go forward with surgery with pulmonary optimization by anesthesia.   - Surgery to be done in an upright position to limit lung involvement.     Medications: was instructed to hold aspirin and ibuprofen for 7 days prior to surgery    APPROVAL GIVEN to proceed with proposed procedure, without further diagnostic evaluation discussed risks of not having evaluation by pulmonology prior to surgery with patient. Discussed that she would be full code for procedure and may result in intubation prolonged afterwards.       Signed Electronically by: Oscar Delatorre MD, MD    Copy of this evaluation report is provided to requesting physician.    [facility] Preop Guidelines

## 2018-04-04 NOTE — TELEPHONE ENCOUNTER
Pt has a surgery(Cervical Foraminotomy) on Monday, has an appointment with Anne Hammer tomorrow for pre-op. She has a hx of ILD. Her surgeon recommended her to get PFT before surgery. Pt requesting Anne Hammer to order the PFT today, if possible. So that she can schedule for that asap. LOV was on 12/23/16.     With any questions, pt can be reached at [phone](OK to LM).    Matti, RN  Triage Nurse

## 2018-04-04 NOTE — PATIENT INSTRUCTIONS
1) Xray and labs downstairs then you can go    2) I will let you know what pulmonary says about optimizing things before surgery    Oscar Delatorre MD    Before Your Surgery      Call your surgeon if there is any change in your health. This includes signs of a cold or flu (such as a sore throat, runny nose, cough, rash or fever).    Do not smoke, drink alcohol or take over the counter medicine (unless your surgeon or primary care doctor tells you to) for the 24 hours before and after surgery.    If you take prescribed drugs: Follow your doctor s orders about which medicines to take and which to stop until after surgery.    Eating and drinking prior to surgery: follow the instructions from your surgeon    Take a shower or bath the night before surgery. Use the soap your surgeon gave you to gently clean your skin. If you do not have soap from your surgeon, use your regular soap. Do not shave or scrub the surgery site.  Wear clean pajamas and have clean sheets on your bed.

## 2018-04-04 NOTE — MR AVS SNAPSHOT
After Visit Summary   4/4/2018    Wanda Kaur    MRN: [id number]           Patient Information     Date Of Birth          1945        Visit Information        Provider Department      4/4/2018 2:30 PM Oscar Delatorre MD [facility]        Today's Diagnoses     Preop general physical exam    -  1    ILD (interstitial lung disease) (H)          Care Instructions    1) Xray and labs downstairs then you can go    2) I will let you know what pulmonary says about optimizing things before surgery    Oscar Delatorre MD    Before Your Surgery      Call your surgeon if there is any change in your health. This includes signs of a cold or flu (such as a sore throat, runny nose, cough, rash or fever).    Do not smoke, drink alcohol or take over the counter medicine (unless your surgeon or primary care doctor tells you to) for the 24 hours before and after surgery.    If you take prescribed drugs: Follow your doctor s orders about which medicines to take and which to stop until after surgery.    Eating and drinking prior to surgery: follow the instructions from your surgeon    Take a shower or bath the night before surgery. Use the soap your surgeon gave you to gently clean your skin. If you do not have soap from your surgeon, use your regular soap. Do not shave or scrub the surgery site.  Wear clean pajamas and have clean sheets on your bed.           Follow-ups after your visit        Your next 10 appointments already scheduled     Apr 09, 2018   Procedure with Jesus Camejo MD   [facility] (--)    [address], [address]  [facility] [id number] [phone]              Who to contact     If you have questions or need follow up information about today's clinic visit or your schedule please contact [facility] directly at [phone].  Normal or non-critical lab and imaging results will be communicated to you by MyChart, letter or phone within  "4 business days after the clinic has received the results. If you do not hear from us within 7 days, please contact the clinic through [employer] or phone. If you have a critical or abnormal lab result, we will notify you by phone as soon as possible.  Submit refill requests through [employer] or call your pharmacy and they will forward the refill request to us. Please allow 3 business days for your refill to be completed.          Additional Information About Your Visit        [employer] Information     [employer] gives you secure access to your electronic health record. If you see a primary care provider, you can also send messages to your care team and make appointments. If you have questions, please call your primary care clinic.  If you do not have a primary care provider, please call [phone] and they will assist you.        Care EveryWhere ID     This is your Care EveryWhere ID. This could be used by other organizations to access your [facility] medical records  [id number]        Your Vitals Were     Pulse Temperature Height Pulse Oximetry BMI (Body Mass Index)       98 98.1  F (36.7  C) (Tympanic) 4' 11\" (1.499 m) 96% 29.49 kg/m2        Blood Pressure from Last 3 Encounters:   04/04/18 122/58   12/23/16 126/64   11/23/16 118/56    Weight from Last 3 Encounters:   04/04/18 146 lb (66.2 kg)   12/23/16 158 lb 9.6 oz (71.9 kg)   12/08/16 154 lb (69.9 kg)              We Performed the Following     CBC with platelets differential     Comprehensive metabolic panel     EKG 12-lead complete w/read - Clinics     Spirometry, Breathing Capacity: Normal Order, Clinic Performed          Today's Medication Changes          These changes are accurate as of 4/4/18  3:15 PM.  If you have any questions, ask your nurse or doctor.               These medicines have changed or have updated prescriptions.        Dose/Directions    gabapentin 300 MG capsule   Commonly known as:  NEURONTIN   This may have changed:    - medication " strength  - how much to take  - when to take this   Changed by:  Oscar Delatorre MD        Dose:  600 mg   Take 2 capsules (600 mg) by mouth 2 times daily   Refills:  0         Stop taking these medicines if you haven't already. Please contact your care team if you have questions.     benzonatate 200 MG capsule   Commonly known as:  TESSALON   Stopped by:  Oscar Delatorre MD           cetirizine 10 MG tablet   Commonly known as:  zyrTEC   Stopped by:  Oscar Delatorre MD           furosemide 20 MG tablet   Commonly known as:  LASIX   Stopped by:  Oscar Delatorre MD           hydrocortisone 2.5 % cream   Commonly known as:  ANUSOL-HC   Stopped by:  Oscar Delatorre MD           HYDROmorphone (STANDARD CONC) 1 MG/ML Liqd liquid   Commonly known as:  DILAUDID   Stopped by:  Oscar Delatorre MD           mycophenolate 500 MG tablet   Commonly known as:  GENERIC EQUIVALENT   Stopped by:  Oscar Deltaorre MD           ondansetron 4 MG ODT tab   Commonly known as:  ZOFRAN ODT   Stopped by:  Oscar Delatorre MD           oseltamivir 75 MG capsule   Commonly known as:  TAMIFLU   Stopped by:  Oscar Delatorre MD           predniSONE 1 MG tablet   Commonly known as:  DELTASONE   Stopped by:  Oscar Delatorre MD           ranitidine 150 MG tablet   Commonly known as:  ZANTAC   Stopped by:  Oscar Delatorre MD           RiTUXimab 500 MG/50ML Soln   Stopped by:  Oscar Delatorre MD           sulfamethoxazole-trimethoprim 400-80 MG per tablet   Commonly known as:  BACTRIM/SEPTRA   Stopped by:  Oscar Delatorre MD                    Primary Care Provider Office Phone # Fax #    Oscar Delatorre [phone] [address] [place] [address]        Equal Access to Services     [facility]: Hadii yony barnes Sorajeev, waaxda luqadaha, qaybta kaalmelvin james. So [facility] [phone].    ATENCIÓN: Gabe del valle  español, tiene a hyde disposición servicios gratuitos de asistencia lingüística. Eileen lovett [phone].    We comply with applicable federal civil rights laws and [place] laws. We do not discriminate on the basis of race, color, national origin, age, disability, sex, sexual orientation, or gender identity.            Thank you!     Thank you for choosing [facility]  for your care. Our goal is always to provide you with excellent care. Hearing back from our patients is one way we can continue to improve our services. Please take a few minutes to complete the written survey that you may receive in the mail after your visit with us. Thank you!             Your Updated Medication List - Protect others around you: Learn how to safely use, store and throw away your medicines at www.disposemymeds.org.          This list is accurate as of 4/4/18  3:15 PM.  Always use your most recent med list.                   Brand Name Dispense Instructions for use Diagnosis    albuterol 108 (90 BASE) MCG/ACT Inhaler    PROAIR HFA/PROVENTIL HFA/VENTOLIN HFA    1 Inhaler    Inhale 2 puffs into the lungs every 6 hours as needed for shortness of breath / dyspnea or wheezing    ILD (interstitial lung disease) (H)       CYANOCOBALAMIN SL      Place under the tongue daily        gabapentin 300 MG capsule    NEURONTIN     Take 2 capsules (600 mg) by mouth 2 times daily        ibuprofen 200 MG tablet    ADVIL/MOTRIN     Take 400 mg by mouth every 4 hours as needed for mild pain        ipratropium - albuterol 0.5 mg/2.5 mg/3 mL 0.5-2.5 (3) MG/3ML neb solution    DUONEB    90 vial    Take 1 vial (3 mLs) by nebulization every 4 hours as needed for shortness of breath / dyspnea or wheezing    Mild intermittent asthma with acute exacerbation       nebulizer/tubing/mouthpiece Kit     1 kit    Dispense all necessary supplies for nebulizer machine    Mild intermittent asthma       order for DME     1 Device    Please provide patient with  liquid oxygen with home fill system.  Patient requires 6 liters oxygen per minute via nasal canula with activity.  Please provide patient with oximyzer. This is for lifetime use.    ILD (interstitial lung disease) (H)       VITAMIN D-400 PO      Takes 2 gummies daily - 800 units

## 2018-04-05 LAB
ALBUMIN SERPL-MCNC: 3.7 G/DL (ref 3.4–5)
ALP SERPL-CCNC: 72 U/L (ref 40–150)
ALT SERPL W P-5'-P-CCNC: 22 U/L (ref 0–50)
ANION GAP SERPL CALCULATED.3IONS-SCNC: 6 MMOL/L (ref 3–14)
AST SERPL W P-5'-P-CCNC: 19 U/L (ref 0–45)
BILIRUB SERPL-MCNC: 0.2 MG/DL (ref 0.2–1.3)
BUN SERPL-MCNC: 16 MG/DL (ref 7–30)
CALCIUM SERPL-MCNC: 9.2 MG/DL (ref 8.5–10.1)
CHLORIDE SERPL-SCNC: 104 MMOL/L (ref 94–109)
CO2 SERPL-SCNC: 31 MMOL/L (ref 20–32)
CREAT SERPL-MCNC: 0.95 MG/DL (ref 0.52–1.04)
GFR SERPL CREATININE-BSD FRML MDRD: 58 ML/MIN/1.7M2
GLUCOSE SERPL-MCNC: 99 MG/DL (ref 70–99)
POTASSIUM SERPL-SCNC: 4.2 MMOL/L (ref 3.4–5.3)
PROT SERPL-MCNC: 6.7 G/DL (ref 6.8–8.8)
SODIUM SERPL-SCNC: 141 MMOL/L (ref 133–144)

## 2018-04-06 ENCOUNTER — TELEPHONE (OUTPATIENT)
Dept: PEDIATRICS | Facility: CLINIC | Age: 73
End: 2018-04-06

## 2018-04-06 NOTE — TELEPHONE ENCOUNTER
PCP YUMIKO    Patient called back.     Reports she spoke with Isabela at [facility] and after all they left the decision up to patient if she wanted to see Dr. Garcia on Wednesday or proceed with surgery.     Triage read over with patient her Pre-op Recommendations again.    Patient states she would like to go ahead and proceed with surgery. She will talk things out with the anesthesiologist on surgery day and see how things go at that time. She will also discuss her wishes with anesthesia if she is needing prolonged intubation.    Sue LONG RN, BSN, PHN  [facility] RN

## 2018-04-06 NOTE — TELEPHONE ENCOUNTER
Awaiting call back from [facility] clinic, Khushi [phone]    Regarding next week Appt. Possibly Wednesday. Would like to inform patient of time and date so she can work on rescheduling surgery.    Triage spoke with patient she is ok with waiting to see Pulmonary if needing clearance but does not want to push out surgery to far.    Sue LONG RN, BSN, PHN  [facility] RN

## 2018-04-06 NOTE — TELEPHONE ENCOUNTER
Spoke with Pulmonary Clinic, Isabela    They will call patient regarding appointment suggestion.    They presumed patient dismissed them as she wanted to enter into Hospice and has not seen Dr. Garcia for almost a year.    Sue LONG RN, BSN, N  [place] RN

## 2018-04-06 NOTE — H&P (VIEW-ONLY)
[facility]  [address]  [address]  [place] [address] [phone]  Dept: [phone]    PRE-OP EVALUATION:  Today's date: 2018    Wanda Kaur (: 1945) presents for pre-operative evaluation assessment as requested by Dr. Camejo.  She requires evaluation and anesthesia risk assessment prior to undergoing surgery/procedure for treatment of neck .    Fax number for surgical facility: [facility]   Primary Physician: Oscar Delatorre  Type of Anesthesia Anticipated: General    Patient has a Health Care Directive or Living Will:  YES     Preop Questions 2018   Who is doing your surgery? dr yokasta camejo   What are you having done? cervical neck for impinged nerve   Date of Surgery/Procedure: 18   1.  Do you have a history of Heart attack, stroke, stent, coronary bypass surgery, or other heart surgery? No   2.  Do you ever have any pain or discomfort in your chest? No   3.  Do you have a history of  Heart Failure? No   4.   Are you troubled by shortness of breath when:  walking on a level surface, or up a slight hill, or at night? YES - has interstitial lung disease   5.  Do you currently have a cold, bronchitis or other respiratory infection? No   6.  Do you have a cough, shortness of breath, or wheezing? YES - see below   7.  Do you sometimes get pains in the calves of your legs when you walk? No   8. Do you or anyone in your family have previous history of blood clots? No   9.  Do you or does anyone in your family have a serious bleeding problem such as prolonged bleeding following surgeries or cuts? No   10. Have you ever had problems with anemia or been told to take iron pills? No   11. Have you had any abnormal blood loss such as black, tarry or bloody stools, or abnormal vaginal bleeding? No   12. Have you ever had a blood transfusion? YES -    13. Have you or any of your relatives ever had problems with anesthesia? No   14. Do you have  sleep apnea, excessive snoring or daytime drowsiness? No   15. Do you have any prosthetic heart valves? No   16. Do you have prosthetic joints? YES - shoulders   17. Is there any chance that you may be pregnant? No         HPI:     HPI related to upcoming procedure: Ella is a 72 yr old female with history of intersitial lung disease, cervical radiculopathy, rheumatoid arthritis who presents for pre-operative exam prior to cervical foraminotomy. She has been having numbness and weakness in her right hand.    ILD: she has had interestitial lung disease since 2016. She has bee on 4 L of oxygen therapy at home. Oxygen will drop down to mid 80'% with exertion on 3L but will stay above 90% on 4L. She feels that her breathing has been stable. No increased wheezing or shortness of breath. Will occasionally use morphine if having shortness of breath. Uses albuterol 2-3 times per day with some help. Had been evaluated by pulmonology and treated with rituximab and cell cept. She has been off all medications and feels that things are stable.     She is not on immunosuppression therapy for rheumatoid arthritis    MEDICAL HISTORY:     Patient Active Problem List    Diagnosis Date Noted     Cervical radiculopathy 03/13/2018     Priority: Medium     Cervical pain 03/13/2018     Priority: Medium     Status post coronary angiogram 09/14/2016     Priority: Medium     Abnormal CT of the chest 09/06/2016     Priority: Medium     Sick-euthyroid syndrome 08/03/2016     Priority: Medium     Anemia, unspecified type 08/03/2016     Priority: Medium     ILD (interstitial lung disease) (H) 07/08/2016     Priority: Medium     Acute respiratory failure with hypoxia (H) 06/27/2016     Priority: Medium     Pneumonia of both lungs due to methicillin resistant Staphylococcus aureus (MRSA), unspecified part of lung (H) 06/21/2016     Priority: Medium     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     Priority: Medium     Health Care Home 06/17/2016      Priority: Medium       Status:  Closed - being followed by pulmonology Care Coordination [facility]   Care Coordinator:  Lavern Cho [phone]  See Letters for HC Care Plan  Date:  July 6, 2016             Hypoxia 06/10/2016     Priority: Medium     Pneumonia of both upper lobes 06/02/2016     Priority: Medium     Rheumatoid arthritis, involving unspecified site, unspecified rheumatoid factor presence (H) 05/09/2016     Priority: Medium     Immunosuppression (H) 05/09/2016     Priority: Medium     Neck pain 07/14/2015     Priority: Medium     Enthesopathy of hip region, unspecified laterality 07/08/2015     Priority: Medium     Mediastinal mass 04/24/2014     Priority: Medium     Suspect benign thymoma       Advance Care Planning 11/10/2011     Priority: Medium     Advance Care Planning 11/4/2016: Receipt of ACP document:  Received: Health Care Directive which was witnessed or notarized on 11-25-13.  Document previously scanned on 11-3-16.  Validation form completed and sent to be scanned.  Code Status reflects choices in most recent ACP document-POLST of 8/2/16. Of note patient was evaluating a lung transplant. Note of 10/27 states she is declining; however recommend a goals of care discussion to confirm current choices and POLST/code status of DNR/DNI.  Confirmed/documented designated decision maker(s).  Added by Veronica Ray RN, System Director ACP-Honoring Choices   Advance Care Planning 8/9/2016: Receipt of ACP document:  Received: POLST which was signed and dated by provider on 8/2/16.  Document not previously scanned.  Reviewed for validity as medical order and sent to be scanned.  Code Status reflects choices in most recent ACP document.  Confirmed/documented designated decision maker(s). Copy mailed to patient.  Added by Bella Min  Advance Care Planning 11/10/11 Patient states has Advance Directive and will bring in a copy to clinic . Margaret Kimble CMA         CARDIOVASCULAR SCREENING; LDL  GOAL LESS THAN 160 09/27/2010     Priority: Medium     Osteoarthrosis, hand      Priority: Medium     inflammatory component;;  following with Rheum.  Problem list name updated by automated process. Provider to review       Enthesopathy of hip region 03/15/2005     Priority: Medium     Other symptoms involving cardiovascular system 11/03/2003     Priority: Medium     Right carotid bruit        Past Medical History:   Diagnosis Date     Asthma      Enthesopathy of hip 3/15/2005     Enthesopathy of hip region      Mediastinal mass 4/24/2014    enlarging, presumed benign thymona     Mild intermittent asthma     triggered by URI's     NSIP (nonspecific interstitial pneumonia) (H)      Osteoarthrosis, unspecified whether generalized or localized, hand     inflammatory component;;  following with Rheum.     Other chronic pain      Pain in joint, shoulder region     (hx of impingement both shoulders)     PONV (postoperative nausea and vomiting)      Right carotid bruit 11/3/2003     Sciatica     better with epidural steroid approx 2006     Past Surgical History:   Procedure Laterality Date     ABDOMEN SURGERY  20yrs plus    lap     ARTHROPLASTY SHOULDER  10/2010    right     ARTHROPLASTY SHOULDER  12/28/2011    Procedure:ARTHROPLASTY SHOULDER; Left Total Shoulder Arthroplasty  ; Surgeon:HERBERT LIAO; Location:[facility] OR     BIOPSY  2015    Vats proceedure for thymoma     BREAST SURGERY Left     breast cysts     C NONSPECIFIC PROCEDURE      L breast cysts      CARPAL TUNNEL RELEASE RT/LT      R carpal release     COLONOSCOPY       FUSION CERVICAL ANTERIOR ONE LEVEL      L c6-7 cervical disc with fusion     HC ESOPH/GAS REFLUX TEST W NASAL IMPED >1 HR N/A 9/22/2016    Procedure: ESOPHAGEAL IMPEDENCE FUNCTION TEST WITH 24 HOUR PH GREATER THAN 1 HOUR;  Surgeon: Bret Magdaleno MD;  Location: [facility]     HC LAPAROSCOPY, SURGICAL; CHOLECYSTECTOMY      lap choly 1990's     HEAD & NECK SURGERY       HYSTERECTOMY TOTAL ABDOMINAL,  BILATERAL SALPINGO-OOPHORECTOMY, COMBINED       THORACOSCOPIC WEDGE RESECTION LUNG Right 3/24/2015    Procedure: THORACOSCOPIC WEDGE RESECTION LUNG;  Surgeon: Júnior Oakes MD;  Location:  OR     Current Outpatient Prescriptions   Medication Sig Dispense Refill     mycophenolate (CELLCEPT - GENERIC EQUIVALENT) 500 MG tablet TAKE 3 TABLETS(1500 MG) BY MOUTH TWICE DAILY 180 tablet 3     benzonatate (TESSALON) 200 MG capsule Take 1 capsule (200 mg) by mouth 3 times daily as needed for cough 90 capsule 3     albuterol (PROAIR HFA, PROVENTIL HFA, VENTOLIN HFA) 108 (90 BASE) MCG/ACT inhaler Inhale 2 puffs into the lungs every 6 hours as needed for shortness of breath / dyspnea or wheezing 1 Inhaler 11     ranitidine (ZANTAC) 150 MG tablet Take 1 tablet (150 mg) by mouth 2 times daily 60 tablet 11     predniSONE (DELTASONE) 1 MG tablet Take 4 tablets daily with 10 mg for total of 14 mg daily for 2 weeks, then 13 mg daily for 2 weeks, then 12 mg daily for 2 weeks, 11 mg daily for 2 weeks, then 10 mg daily. 120 tablet 3     GABAPENTIN PO        hydrocortisone (ANUSOL-HC) 2.5 % rectal cream Place rectally 2 times daily 30 g 3     cetirizine (ZYRTEC) 10 MG tablet Take 10 mg by mouth daily       oseltamivir (TAMIFLU) 75 MG capsule Take 1 capsule (75 mg) by mouth daily 10 capsule 0     ondansetron (ZOFRAN ODT) 4 MG disintegrating tablet Take 1-2 tablets (4-8 mg) by mouth every 8 hours as needed for nausea 20 tablet 1     sulfamethoxazole-trimethoprim (BACTRIM,SEPTRA) 400-80 MG per tablet Take 1 tablet by mouth daily 90 tablet 3     HYDROmorphone (DILAUDID) 1 MG/ML LIQD Take 1-2 mLs (1-2 mg) by mouth every 3 hours as needed for other (dyspnea) 180 mL 0     RiTUXimab 500 MG/50ML SOLN        furosemide (LASIX) 20 MG tablet Take 1 tablet (20 mg) by mouth daily (Patient taking differently: Take 20 mg by mouth daily as needed ) 90 tablet 3     ibuprofen (ADVIL,MOTRIN) 200 MG tablet Take 400 mg by mouth every 4 hours as  "needed for mild pain       CYANOCOBALAMIN SL Place under the tongue daily       order for DME Please provide patient with liquid oxygen with home fill system.  Patient requires 6 liters oxygen per minute via nasal canula with activity.  Please provide patient with oximyzer. This is for lifetime use. 1 Device 0     ipratropium - albuterol 0.5 mg/2.5 mg/3 mL (DUONEB) 0.5-2.5 (3) MG/3ML nebulization Take 1 vial (3 mLs) by nebulization every 4 hours as needed for shortness of breath / dyspnea or wheezing 90 vial 3     Respiratory Therapy Supplies (NEBULIZER/TUBING/MOUTHPIECE) KIT Dispense all necessary supplies for nebulizer machine 1 kit 99     Cholecalciferol (VITAMIN D-400 PO) Takes 2 gummies daily - 800 units       OTC products: stopping ibuprofen 4/3    Allergies   Allergen Reactions     No Known Drug Allergies       Latex Allergy: NO    Social History   Substance Use Topics     Smoking status: Never Smoker     Smokeless tobacco: Never Used      Comment: smoked very briefly as a teen     Alcohol use 0.6 oz/week     1 Standard drinks or equivalent per week      Comment: SMALL, 0.5oz/wk     History   Drug Use No       REVIEW OF SYSTEMS:   Constitutional, neuro, ENT, endocrine, pulmonary, cardiac, gastrointestinal, genitourinary, musculoskeletal, integument and psychiatric systems are negative, except as otherwise noted.    EXAM:   /58 (BP Location: Right arm, Patient Position: Chair, Cuff Size: Adult Regular)  Pulse 98  Temp 98.1  F (36.7  C) (Tympanic)  Ht 4' 11\" (1.499 m)  Wt 146 lb (66.2 kg)  SpO2 96%  BMI 29.49 kg/m2   O2 drops to less than 88% with decreasing oxygen flow rate- 97% on 4L      GENERAL APPEARANCE: healthy, alert and no distress     EYES: EOMI, PERRL     HENT: ear canals and TM's normal and nose and mouth without ulcers or lesions     NECK: no adenopathy, no asymmetry, masses, or scars and thyroid normal to palpation     RESP: on 4L oxygen, noted bilateral crackles throughout, fairly " good air movement, easy work of breathing.     CV: regular rates and rhythm, normal S1 S2, no S3 or S4 and no murmur, click or rub     ABDOMEN:  soft, nontender, no HSM or masses and bowel sounds normal     MS: noted weakness in right arm and decreased ROM due to pain     SKIN: no suspicious lesions or rashes     NEURO: Normal strength and tone, sensory exam grossly normal, mentation intact and speech normal     PSYCH: mentation appears normal. and affect normal/bright     LYMPHATICS: No cervical adenopathy    DIAGNOSTICS:   EKG: appears normal, NSR, normal axis, normal intervals, no acute ST/T changes c/w ischemia, no LVH by voltage criteria, unchanged from previous tracings    Results for orders placed or performed in visit on 04/04/18 (from the past 48 hour(s))   Spirometry, Breathing Capacity: Normal Order, Clinic Performed   Result Value Ref Range    FEV-1      FVC      FEV1/FVC      FEF 25/75     CBC with platelets differential   Result Value Ref Range    WBC 6.5 4.0 - 11.0 10e9/L    RBC Count 3.47 (L) 3.8 - 5.2 10e12/L    Hemoglobin 11.0 (L) 11.7 - 15.7 g/dL    Hematocrit 35.0 35.0 - 47.0 %     (H) 78 - 100 fl    MCH 31.7 26.5 - 33.0 pg    MCHC 31.4 (L) 31.5 - 36.5 g/dL    RDW 12.3 10.0 - 15.0 %    Platelet Count 291 150 - 450 10e9/L    Diff Method Automated Method     % Neutrophils 78.4 %    % Lymphocytes 10.8 %    % Monocytes 7.5 %    % Eosinophils 2.8 %    % Basophils 0.5 %    Absolute Neutrophil 5.1 1.6 - 8.3 10e9/L    Absolute Lymphocytes 0.7 (L) 0.8 - 5.3 10e9/L    Absolute Monocytes 0.5 0.0 - 1.3 10e9/L    Absolute Eosinophils 0.2 0.0 - 0.7 10e9/L    Absolute Basophils 0.0 0.0 - 0.2 10e9/L   Comprehensive metabolic panel   Result Value Ref Range    Sodium 141 133 - 144 mmol/L    Potassium 4.2 3.4 - 5.3 mmol/L    Chloride 104 94 - 109 mmol/L    Carbon Dioxide 31 20 - 32 mmol/L    Anion Gap 6 3 - 14 mmol/L    Glucose 99 70 - 99 mg/dL    Urea Nitrogen 16 7 - 30 mg/dL    Creatinine 0.95 0.52 - 1.04  mg/dL    GFR Estimate 58 (L) >60 mL/min/1.7m2    GFR Estimate If Black 70 >60 mL/min/1.7m2    Calcium 9.2 8.5 - 10.1 mg/dL    Bilirubin Total 0.2 0.2 - 1.3 mg/dL    Albumin 3.7 3.4 - 5.0 g/dL    Protein Total 6.7 (L) 6.8 - 8.8 g/dL    Alkaline Phosphatase 72 40 - 150 U/L    ALT 22 0 - 50 U/L    AST 19 0 - 45 U/L     Chest xray:   IMPRESSION: Interstitial densities are scattered throughout both lung  bases fairly symmetrically, unchanged. No acute infiltrates. Mild  cardiomegaly. Bilateral shoulder prostheses. No pleural effusion.    Recent Labs   Lab Test  12/28/16   1440  11/10/16   1614   09/08/16   0806   07/07/16   1242   03/24/15   0615   HGB  9.8*  10.6*   < >  10.0*   < >  12.1   < >  12.2   PLT  247  238   < >  217   < >  131*   < >  188   INR   --    --    --   0.89   --    --    --   0.90   NA  141  140   < >  140   < >  134   < >  145*   POTASSIUM  4.1  4.3   < >  3.6   < >  4.9   < >  4.1   CR  0.94  0.84   < >  0.86   < >  0.75   < >  0.96   A1C   --    --    --   5.5   --   6.1*   --    --     < > = values in this interval not displayed.        IMPRESSION:   Reason for surgery/procedure: Cervical radiculopathy  Diagnosis/reason for consult: Pre-operative clearance    The proposed surgical procedure is considered INTERMEDIATE risk.    REVISED CARDIAC RISK INDEX  The patient has the following serious cardiovascular risks for perioperative complications such as (MI, PE, VFib and 3  AV Block):  Interstitial Lung disease on chronic oxygen    INTERPRETATION: Moderate risk from pulmonary disease    The patient has the following additional risks for perioperative complications:  Oxygen dependent lung disease      ICD-10-CM    1. Preop general physical exam Z01.818 EKG 12-lead complete w/read - Clinics   2. ILD (interstitial lung disease) (H) J84.9 Spirometry, Breathing Capacity: Normal Order, Clinic Performed     XR Chest 2 Views     CBC with platelets differential     Comprehensive metabolic panel   3.  Cervical radiculopathy M54.12        RECOMMENDATIONS:       Pulmonary Risk  Incentive spirometry post op  Respiratory Therapy (Respiratory Care IP Consult)  post op  Minimize chris trauma during surgery and monitor end tidal CO2  - She will bring with albuterol to surgery  - discussed with pulmonology and noted that they could not comment on optimization without visit with patient. Discussed with patient and would like to go forward with surgery with pulmonary optimization by anesthesia.   - Surgery to be done in an upright position to limit lung involvement.     Medications: was instructed to hold aspirin and ibuprofen for 7 days prior to surgery    APPROVAL GIVEN to proceed with proposed procedure, without further diagnostic evaluation discussed risks of not having evaluation by pulmonology prior to surgery with patient. Discussed that she would be full code for procedure and may result in intubation prolonged afterwards.       Signed Electronically by: Oscar Delatorre MD, MD    Copy of this evaluation report is provided to requesting physician.    [facility] Preop Guidelines

## 2018-04-09 ENCOUNTER — ANESTHESIA EVENT (OUTPATIENT)
Dept: SURGERY | Facility: CLINIC | Age: 73
End: 2018-04-09
Payer: MEDICARE

## 2018-04-09 ENCOUNTER — APPOINTMENT (OUTPATIENT)
Dept: GENERAL RADIOLOGY | Facility: CLINIC | Age: 73
End: 2018-04-09
Attending: ORTHOPAEDIC SURGERY
Payer: MEDICARE

## 2018-04-09 ENCOUNTER — HOSPITAL ENCOUNTER (OUTPATIENT)
Facility: CLINIC | Age: 73
Discharge: HOME | End: 2018-04-10
Attending: ORTHOPAEDIC SURGERY | Admitting: ORTHOPAEDIC SURGERY
Payer: MEDICARE

## 2018-04-09 ENCOUNTER — ANESTHESIA (OUTPATIENT)
Dept: SURGERY | Facility: CLINIC | Age: 73
End: 2018-04-09
Payer: MEDICARE

## 2018-04-09 DIAGNOSIS — M54.12 CERVICAL RADICULOPATHY: Primary | ICD-10-CM

## 2018-04-09 LAB — POTASSIUM SERPL-SCNC: 4.1 MMOL/L (ref 3.4–5.3)

## 2018-04-09 PROCEDURE — 71000012 ZZH RECOVERY PHASE 1 LEVEL 1 FIRST HR: Performed by: ORTHOPAEDIC SURGERY

## 2018-04-09 PROCEDURE — 40000986 XR CERVICAL SPINE PORT 1 VW

## 2018-04-09 PROCEDURE — 25000125 ZZHC RX 250: Performed by: NURSE ANESTHETIST, CERTIFIED REGISTERED

## 2018-04-09 PROCEDURE — 25000132 ZZH RX MED GY IP 250 OP 250 PS 637: Mod: GY | Performed by: ORTHOPAEDIC SURGERY

## 2018-04-09 PROCEDURE — 37000009 ZZH ANESTHESIA TECHNICAL FEE, EACH ADDTL 15 MIN: Performed by: ORTHOPAEDIC SURGERY

## 2018-04-09 PROCEDURE — 94640 AIRWAY INHALATION TREATMENT: CPT

## 2018-04-09 PROCEDURE — 40000170 ZZH STATISTIC PRE-PROCEDURE ASSESSMENT II: Performed by: ORTHOPAEDIC SURGERY

## 2018-04-09 PROCEDURE — 25000128 H RX IP 250 OP 636: Performed by: NURSE ANESTHETIST, CERTIFIED REGISTERED

## 2018-04-09 PROCEDURE — 36000069 ZZH SURGERY LEVEL 5 EA 15 ADDTL MIN: Performed by: ORTHOPAEDIC SURGERY

## 2018-04-09 PROCEDURE — 36415 COLL VENOUS BLD VENIPUNCTURE: CPT | Performed by: ANESTHESIOLOGY

## 2018-04-09 PROCEDURE — 27210794 ZZH OR GENERAL SUPPLY STERILE: Performed by: ORTHOPAEDIC SURGERY

## 2018-04-09 PROCEDURE — 36000067 ZZH SURGERY LEVEL 5 1ST 30 MIN: Performed by: ORTHOPAEDIC SURGERY

## 2018-04-09 PROCEDURE — 27210995 ZZH RX 272: Performed by: ORTHOPAEDIC SURGERY

## 2018-04-09 PROCEDURE — 25000128 H RX IP 250 OP 636: Performed by: ANESTHESIOLOGY

## 2018-04-09 PROCEDURE — 25000128 H RX IP 250 OP 636

## 2018-04-09 PROCEDURE — 25000128 H RX IP 250 OP 636: Performed by: ORTHOPAEDIC SURGERY

## 2018-04-09 PROCEDURE — A9270 NON-COVERED ITEM OR SERVICE: HCPCS | Mod: GY | Performed by: ORTHOPAEDIC SURGERY

## 2018-04-09 PROCEDURE — 25000566 ZZH SEVOFLURANE, EA 15 MIN: Performed by: ORTHOPAEDIC SURGERY

## 2018-04-09 PROCEDURE — 25000125 ZZHC RX 250: Performed by: ANESTHESIOLOGY

## 2018-04-09 PROCEDURE — 25000125 ZZHC RX 250: Performed by: ORTHOPAEDIC SURGERY

## 2018-04-09 PROCEDURE — 37000008 ZZH ANESTHESIA TECHNICAL FEE, 1ST 30 MIN: Performed by: ORTHOPAEDIC SURGERY

## 2018-04-09 PROCEDURE — C9399 UNCLASSIFIED DRUGS OR BIOLOG: HCPCS | Performed by: NURSE ANESTHETIST, CERTIFIED REGISTERED

## 2018-04-09 PROCEDURE — 84132 ASSAY OF SERUM POTASSIUM: CPT | Performed by: ANESTHESIOLOGY

## 2018-04-09 PROCEDURE — 40000275 ZZH STATISTIC RCP TIME EA 10 MIN

## 2018-04-09 RX ORDER — GABAPENTIN 300 MG/1
600 CAPSULE ORAL 2 TIMES DAILY
Status: DISCONTINUED | OUTPATIENT
Start: 2018-04-09 | End: 2018-04-10 | Stop reason: HOSPADM

## 2018-04-09 RX ORDER — ALBUTEROL SULFATE 90 UG/1
2 AEROSOL, METERED RESPIRATORY (INHALATION) EVERY 6 HOURS PRN
Status: DISCONTINUED | OUTPATIENT
Start: 2018-04-09 | End: 2018-04-10 | Stop reason: HOSPADM

## 2018-04-09 RX ORDER — CEFAZOLIN SODIUM 1 G
1 VIAL (EA) INJECTION SEE ADMIN INSTRUCTIONS
Status: DISCONTINUED | OUTPATIENT
Start: 2018-04-09 | End: 2018-04-09 | Stop reason: HOSPADM

## 2018-04-09 RX ORDER — BACITRACIN ZINC 500 [USP'U]/G
OINTMENT TOPICAL PRN
Status: DISCONTINUED | OUTPATIENT
Start: 2018-04-09 | End: 2018-04-09 | Stop reason: HOSPADM

## 2018-04-09 RX ORDER — ALBUTEROL SULFATE 0.83 MG/ML
2.5 SOLUTION RESPIRATORY (INHALATION) EVERY 4 HOURS PRN
Status: DISCONTINUED | OUTPATIENT
Start: 2018-04-09 | End: 2018-04-09 | Stop reason: HOSPADM

## 2018-04-09 RX ORDER — LIDOCAINE 40 MG/G
CREAM TOPICAL
Status: DISCONTINUED | OUTPATIENT
Start: 2018-04-09 | End: 2018-04-09 | Stop reason: HOSPADM

## 2018-04-09 RX ORDER — BUPIVACAINE HYDROCHLORIDE AND EPINEPHRINE 2.5; 5 MG/ML; UG/ML
INJECTION, SOLUTION EPIDURAL; INFILTRATION; INTRACAUDAL; PERINEURAL PRN
Status: DISCONTINUED | OUTPATIENT
Start: 2018-04-09 | End: 2018-04-09 | Stop reason: HOSPADM

## 2018-04-09 RX ORDER — METOCLOPRAMIDE 5 MG/1
5 TABLET ORAL EVERY 6 HOURS PRN
Status: DISCONTINUED | OUTPATIENT
Start: 2018-04-09 | End: 2018-04-10 | Stop reason: HOSPADM

## 2018-04-09 RX ORDER — SODIUM CHLORIDE, SODIUM LACTATE, POTASSIUM CHLORIDE, CALCIUM CHLORIDE 600; 310; 30; 20 MG/100ML; MG/100ML; MG/100ML; MG/100ML
INJECTION, SOLUTION INTRAVENOUS CONTINUOUS PRN
Status: DISCONTINUED | OUTPATIENT
Start: 2018-04-09 | End: 2018-04-09

## 2018-04-09 RX ORDER — ONDANSETRON 2 MG/ML
INJECTION INTRAMUSCULAR; INTRAVENOUS PRN
Status: DISCONTINUED | OUTPATIENT
Start: 2018-04-09 | End: 2018-04-09

## 2018-04-09 RX ORDER — NALOXONE HYDROCHLORIDE 0.4 MG/ML
.1-.4 INJECTION, SOLUTION INTRAMUSCULAR; INTRAVENOUS; SUBCUTANEOUS
Status: DISCONTINUED | OUTPATIENT
Start: 2018-04-09 | End: 2018-04-10 | Stop reason: HOSPADM

## 2018-04-09 RX ORDER — LORAZEPAM 2 MG/ML
.5-1 INJECTION INTRAMUSCULAR
Status: DISCONTINUED | OUTPATIENT
Start: 2018-04-09 | End: 2018-04-09 | Stop reason: HOSPADM

## 2018-04-09 RX ORDER — ALBUTEROL SULFATE 0.83 MG/ML
1 SOLUTION RESPIRATORY (INHALATION)
COMMUNITY

## 2018-04-09 RX ORDER — IBUPROFEN 400 MG/1
400 TABLET, FILM COATED ORAL EVERY 4 HOURS PRN
Status: DISCONTINUED | OUTPATIENT
Start: 2018-04-09 | End: 2018-04-10 | Stop reason: HOSPADM

## 2018-04-09 RX ORDER — DEXAMETHASONE SODIUM PHOSPHATE 4 MG/ML
INJECTION, SOLUTION INTRA-ARTICULAR; INTRALESIONAL; INTRAMUSCULAR; INTRAVENOUS; SOFT TISSUE PRN
Status: DISCONTINUED | OUTPATIENT
Start: 2018-04-09 | End: 2018-04-09

## 2018-04-09 RX ORDER — SODIUM CHLORIDE, SODIUM LACTATE, POTASSIUM CHLORIDE, CALCIUM CHLORIDE 600; 310; 30; 20 MG/100ML; MG/100ML; MG/100ML; MG/100ML
INJECTION, SOLUTION INTRAVENOUS CONTINUOUS
Status: DISCONTINUED | OUTPATIENT
Start: 2018-04-09 | End: 2018-04-09 | Stop reason: HOSPADM

## 2018-04-09 RX ORDER — PROCHLORPERAZINE MALEATE 5 MG
5 TABLET ORAL
Status: DISCONTINUED | OUTPATIENT
Start: 2018-04-09 | End: 2018-04-10 | Stop reason: HOSPADM

## 2018-04-09 RX ORDER — LIDOCAINE 40 MG/G
CREAM TOPICAL
Status: DISCONTINUED | OUTPATIENT
Start: 2018-04-09 | End: 2018-04-10 | Stop reason: HOSPADM

## 2018-04-09 RX ORDER — HYDROXYZINE HYDROCHLORIDE 10 MG/1
10 TABLET, FILM COATED ORAL EVERY 6 HOURS PRN
Status: DISCONTINUED | OUTPATIENT
Start: 2018-04-09 | End: 2018-04-10 | Stop reason: HOSPADM

## 2018-04-09 RX ORDER — ACETAMINOPHEN 325 MG/1
975 TABLET ORAL ONCE
Status: DISCONTINUED | OUTPATIENT
Start: 2018-04-09 | End: 2018-04-09 | Stop reason: HOSPADM

## 2018-04-09 RX ORDER — ONDANSETRON 4 MG/1
4 TABLET, ORALLY DISINTEGRATING ORAL EVERY 30 MIN PRN
Status: DISCONTINUED | OUTPATIENT
Start: 2018-04-09 | End: 2018-04-09 | Stop reason: HOSPADM

## 2018-04-09 RX ORDER — PROCHLORPERAZINE 25 MG
12.5 SUPPOSITORY, RECTAL RECTAL
Status: DISCONTINUED | OUTPATIENT
Start: 2018-04-09 | End: 2018-04-10 | Stop reason: HOSPADM

## 2018-04-09 RX ORDER — LIDOCAINE HYDROCHLORIDE 20 MG/ML
INJECTION, SOLUTION INFILTRATION; PERINEURAL PRN
Status: DISCONTINUED | OUTPATIENT
Start: 2018-04-09 | End: 2018-04-09

## 2018-04-09 RX ORDER — ONDANSETRON 2 MG/ML
4 INJECTION INTRAMUSCULAR; INTRAVENOUS EVERY 6 HOURS PRN
Status: DISCONTINUED | OUTPATIENT
Start: 2018-04-09 | End: 2018-04-10 | Stop reason: HOSPADM

## 2018-04-09 RX ORDER — HYDROMORPHONE HYDROCHLORIDE 1 MG/ML
0.2 INJECTION, SOLUTION INTRAMUSCULAR; INTRAVENOUS; SUBCUTANEOUS
Status: DISCONTINUED | OUTPATIENT
Start: 2018-04-09 | End: 2018-04-10 | Stop reason: HOSPADM

## 2018-04-09 RX ORDER — SODIUM CHLORIDE 9 MG/ML
INJECTION, SOLUTION INTRAVENOUS CONTINUOUS
Status: DISCONTINUED | OUTPATIENT
Start: 2018-04-09 | End: 2018-04-10 | Stop reason: HOSPADM

## 2018-04-09 RX ORDER — ONDANSETRON 2 MG/ML
4 INJECTION INTRAMUSCULAR; INTRAVENOUS EVERY 30 MIN PRN
Status: DISCONTINUED | OUTPATIENT
Start: 2018-04-09 | End: 2018-04-09 | Stop reason: HOSPADM

## 2018-04-09 RX ORDER — HYDROMORPHONE HYDROCHLORIDE 1 MG/ML
.3-.5 INJECTION, SOLUTION INTRAMUSCULAR; INTRAVENOUS; SUBCUTANEOUS EVERY 5 MIN PRN
Status: DISCONTINUED | OUTPATIENT
Start: 2018-04-09 | End: 2018-04-09 | Stop reason: HOSPADM

## 2018-04-09 RX ORDER — ALBUTEROL SULFATE 0.83 MG/ML
2.5 SOLUTION RESPIRATORY (INHALATION)
Status: DISCONTINUED | OUTPATIENT
Start: 2018-04-09 | End: 2018-04-09 | Stop reason: HOSPADM

## 2018-04-09 RX ORDER — CEFAZOLIN SODIUM 2 G/100ML
2 INJECTION, SOLUTION INTRAVENOUS
Status: COMPLETED | OUTPATIENT
Start: 2018-04-09 | End: 2018-04-09

## 2018-04-09 RX ORDER — PROCHLORPERAZINE MALEATE 5 MG
5 TABLET ORAL EVERY 6 HOURS PRN
Status: DISCONTINUED | OUTPATIENT
Start: 2018-04-09 | End: 2018-04-10 | Stop reason: HOSPADM

## 2018-04-09 RX ORDER — ONDANSETRON 2 MG/ML
INJECTION INTRAMUSCULAR; INTRAVENOUS
Status: COMPLETED
Start: 2018-04-09 | End: 2018-04-09

## 2018-04-09 RX ORDER — ALBUTEROL SULFATE 0.83 MG/ML
1 SOLUTION RESPIRATORY (INHALATION) EVERY 6 HOURS PRN
Status: DISCONTINUED | OUTPATIENT
Start: 2018-04-09 | End: 2018-04-10 | Stop reason: HOSPADM

## 2018-04-09 RX ORDER — FENTANYL CITRATE 50 UG/ML
INJECTION, SOLUTION INTRAMUSCULAR; INTRAVENOUS PRN
Status: DISCONTINUED | OUTPATIENT
Start: 2018-04-09 | End: 2018-04-09

## 2018-04-09 RX ORDER — ACETAMINOPHEN 325 MG/1
650 TABLET ORAL EVERY 6 HOURS PRN
Status: DISCONTINUED | OUTPATIENT
Start: 2018-04-09 | End: 2018-04-10 | Stop reason: HOSPADM

## 2018-04-09 RX ORDER — PROPOFOL 10 MG/ML
INJECTION, EMULSION INTRAVENOUS PRN
Status: DISCONTINUED | OUTPATIENT
Start: 2018-04-09 | End: 2018-04-09

## 2018-04-09 RX ORDER — KETOROLAC TROMETHAMINE 15 MG/ML
15 INJECTION, SOLUTION INTRAMUSCULAR; INTRAVENOUS EVERY 6 HOURS
Status: DISCONTINUED | OUTPATIENT
Start: 2018-04-09 | End: 2018-04-10 | Stop reason: HOSPADM

## 2018-04-09 RX ORDER — FENTANYL CITRATE 50 UG/ML
25-50 INJECTION, SOLUTION INTRAMUSCULAR; INTRAVENOUS
Status: DISCONTINUED | OUTPATIENT
Start: 2018-04-09 | End: 2018-04-09 | Stop reason: HOSPADM

## 2018-04-09 RX ORDER — ONDANSETRON 4 MG/1
4 TABLET, ORALLY DISINTEGRATING ORAL EVERY 6 HOURS PRN
Status: DISCONTINUED | OUTPATIENT
Start: 2018-04-09 | End: 2018-04-10 | Stop reason: HOSPADM

## 2018-04-09 RX ORDER — METOCLOPRAMIDE HYDROCHLORIDE 5 MG/ML
5 INJECTION INTRAMUSCULAR; INTRAVENOUS EVERY 6 HOURS PRN
Status: DISCONTINUED | OUTPATIENT
Start: 2018-04-09 | End: 2018-04-10 | Stop reason: HOSPADM

## 2018-04-09 RX ORDER — NALOXONE HYDROCHLORIDE 0.4 MG/ML
.1-.4 INJECTION, SOLUTION INTRAMUSCULAR; INTRAVENOUS; SUBCUTANEOUS
Status: DISCONTINUED | OUTPATIENT
Start: 2018-04-09 | End: 2018-04-09

## 2018-04-09 RX ORDER — SENNOSIDES 8.6 MG
1 TABLET ORAL DAILY PRN
COMMUNITY

## 2018-04-09 RX ORDER — PROPOFOL 10 MG/ML
INJECTION, EMULSION INTRAVENOUS CONTINUOUS PRN
Status: DISCONTINUED | OUTPATIENT
Start: 2018-04-09 | End: 2018-04-09

## 2018-04-09 RX ORDER — SENNOSIDES 8.6 MG
1 TABLET ORAL DAILY PRN
Status: DISCONTINUED | OUTPATIENT
Start: 2018-04-09 | End: 2018-04-10 | Stop reason: HOSPADM

## 2018-04-09 RX ORDER — OXYCODONE AND ACETAMINOPHEN 5; 325 MG/1; MG/1
1-2 TABLET ORAL EVERY 4 HOURS PRN
Status: DISCONTINUED | OUTPATIENT
Start: 2018-04-09 | End: 2018-04-10 | Stop reason: HOSPADM

## 2018-04-09 RX ADMIN — PHENYLEPHRINE HYDROCHLORIDE 100 MCG: 10 INJECTION, SOLUTION INTRAMUSCULAR; INTRAVENOUS; SUBCUTANEOUS at 12:16

## 2018-04-09 RX ADMIN — PHENYLEPHRINE HYDROCHLORIDE 0.25 MCG/KG/MIN: 10 INJECTION, SOLUTION INTRAMUSCULAR; INTRAVENOUS; SUBCUTANEOUS at 12:10

## 2018-04-09 RX ADMIN — PROPOFOL 30 MCG/KG/MIN: 10 INJECTION, EMULSION INTRAVENOUS at 12:07

## 2018-04-09 RX ADMIN — MIDAZOLAM 2 MG: 1 INJECTION INTRAMUSCULAR; INTRAVENOUS at 11:53

## 2018-04-09 RX ADMIN — HYDROMORPHONE HYDROCHLORIDE 0.5 MG: 1 INJECTION, SOLUTION INTRAMUSCULAR; INTRAVENOUS; SUBCUTANEOUS at 14:20

## 2018-04-09 RX ADMIN — FENTANYL CITRATE 50 MCG: 50 INJECTION, SOLUTION INTRAMUSCULAR; INTRAVENOUS at 11:58

## 2018-04-09 RX ADMIN — LIDOCAINE HYDROCHLORIDE 40 MG: 20 INJECTION, SOLUTION INFILTRATION; PERINEURAL at 11:58

## 2018-04-09 RX ADMIN — ROCURONIUM BROMIDE 10 MG: 10 INJECTION INTRAVENOUS at 12:07

## 2018-04-09 RX ADMIN — ONDANSETRON 4 MG: 2 INJECTION INTRAMUSCULAR; INTRAVENOUS at 15:47

## 2018-04-09 RX ADMIN — HYDROMORPHONE HYDROCHLORIDE 0.5 MG: 1 INJECTION, SOLUTION INTRAMUSCULAR; INTRAVENOUS; SUBCUTANEOUS at 14:30

## 2018-04-09 RX ADMIN — ALBUTEROL SULFATE 2.5 MG: 2.5 SOLUTION RESPIRATORY (INHALATION) at 20:17

## 2018-04-09 RX ADMIN — SODIUM CHLORIDE, POTASSIUM CHLORIDE, SODIUM LACTATE AND CALCIUM CHLORIDE: 600; 310; 30; 20 INJECTION, SOLUTION INTRAVENOUS at 10:20

## 2018-04-09 RX ADMIN — PHENYLEPHRINE HYDROCHLORIDE 100 MCG: 10 INJECTION, SOLUTION INTRAMUSCULAR; INTRAVENOUS; SUBCUTANEOUS at 12:38

## 2018-04-09 RX ADMIN — CEFAZOLIN SODIUM 2 G: 2 INJECTION, SOLUTION INTRAVENOUS at 12:21

## 2018-04-09 RX ADMIN — FENTANYL CITRATE 50 MCG: 50 INJECTION, SOLUTION INTRAMUSCULAR; INTRAVENOUS at 12:38

## 2018-04-09 RX ADMIN — KETOROLAC TROMETHAMINE 15 MG: 15 INJECTION, SOLUTION INTRAMUSCULAR; INTRAVENOUS at 18:21

## 2018-04-09 RX ADMIN — SODIUM CHLORIDE, POTASSIUM CHLORIDE, SODIUM LACTATE AND CALCIUM CHLORIDE: 600; 310; 30; 20 INJECTION, SOLUTION INTRAVENOUS at 13:45

## 2018-04-09 RX ADMIN — ROCURONIUM BROMIDE 40 MG: 10 INJECTION INTRAVENOUS at 11:58

## 2018-04-09 RX ADMIN — SODIUM CHLORIDE: 9 INJECTION, SOLUTION INTRAVENOUS at 18:22

## 2018-04-09 RX ADMIN — SODIUM CHLORIDE, POTASSIUM CHLORIDE, SODIUM LACTATE AND CALCIUM CHLORIDE: 600; 310; 30; 20 INJECTION, SOLUTION INTRAVENOUS at 11:53

## 2018-04-09 RX ADMIN — ROCURONIUM BROMIDE 10 MG: 10 INJECTION INTRAVENOUS at 12:37

## 2018-04-09 RX ADMIN — GABAPENTIN 600 MG: 300 CAPSULE ORAL at 21:42

## 2018-04-09 RX ADMIN — FENTANYL CITRATE 50 MCG: 50 INJECTION, SOLUTION INTRAMUSCULAR; INTRAVENOUS at 14:00

## 2018-04-09 RX ADMIN — PHENYLEPHRINE HYDROCHLORIDE 100 MCG: 10 INJECTION, SOLUTION INTRAMUSCULAR; INTRAVENOUS; SUBCUTANEOUS at 12:22

## 2018-04-09 RX ADMIN — ONDANSETRON 4 MG: 2 INJECTION INTRAMUSCULAR; INTRAVENOUS at 13:51

## 2018-04-09 RX ADMIN — SUGAMMADEX 200 MG: 100 INJECTION, SOLUTION INTRAVENOUS at 13:43

## 2018-04-09 RX ADMIN — PROPOFOL 150 MG: 10 INJECTION, EMULSION INTRAVENOUS at 11:58

## 2018-04-09 RX ADMIN — METOCLOPRAMIDE 5 MG: 5 INJECTION, SOLUTION INTRAMUSCULAR; INTRAVENOUS at 16:07

## 2018-04-09 RX ADMIN — DEXAMETHASONE SODIUM PHOSPHATE 4 MG: 4 INJECTION, SOLUTION INTRA-ARTICULAR; INTRALESIONAL; INTRAMUSCULAR; INTRAVENOUS; SOFT TISSUE at 12:13

## 2018-04-09 RX ADMIN — LIDOCAINE HYDROCHLORIDE 0.5 ML: 10 INJECTION, SOLUTION EPIDURAL; INFILTRATION; INTRACAUDAL; PERINEURAL at 10:20

## 2018-04-09 NOTE — PROGRESS NOTES
I discussed reversal of Ella's with her, and she agreed to full resuscitation during the perioperative period, including CPR, pharmacologic support of circulation, intubation and defibrillation.      I discussed the risk of prolonged intubation, given medical comorbidities and the nature of the procedure, postoperatively with Ella, and she expresses understanding and accepts the risk.

## 2018-04-09 NOTE — IP AVS SNAPSHOT
MRN:[id number]                      After Visit Summary   4/9/2018    Wanda Kaur    MRN: [id number]           Thank you!     Thank you for choosing [facility] for your care. Our goal is always to provide you with excellent care. Hearing back from our patients is one way we can continue to improve our services. Please take a few minutes to complete the written survey that you may receive in the mail after you visit with us. Thank you!        Patient Information     Date Of Birth          1945        Designated Caregiver       Most Recent Value    Caregiver    Will someone help with your care after discharge? yes    Name of designated caregiver Murray    Phone number of caregiver [phone]    Caregiver address [address] [place]      About your hospital stay     You were admitted on:  April 9, 2018 You last received care in the:  [place] [facility] [address] [facility]    You were discharged on:  April 10, 2018       Who to Call     For medical emergencies, please call 911.  For non-urgent questions about your medical care, please call your primary care provider or clinic, [phone]  For questions related to your surgery, please call your surgery clinic        Attending Provider     Provider Jesus Cabral MD Orthopedics       Primary Care Provider Office Phone # Fax #    Oscar Delatorre [phone]      Further instructions from your care team       Discharge Instructions following Cervical Spine Surgery      Wound Care:    Your incision(s) are closed with a suture that will need to be removed.  You will have steri strips (butterfly tapes) across your incision(s).  Leave the steri strips in place until you come to the office to have your sutures removed.  You will need to make an appointment for 1 to 2 weeks following your surgery to have your suture(s) removed and to have an x-ray check.  Please call [phone] to  make an appointment if you haven t already done so.      You do not need to cover your incisions with plastic when you are showering, once you are home from the hospital.  Let the water run over your incision when in the shower, then pat the incision dry.  Cover your incision with a clean bandage after you shower to keep you incision clean until your sutures are removed.    If you had bone graft taken from your iliac crest for your procedure, do not take a bath or sit in a hot tub until your sutures are removed and your incision is well healed.    Pain Medication:    You will be sent home with pain medication from the hospital.  Gradually decrease your usage of the pain medication as you start to feel better.  If you are in need of a refill of pain medication, please call the office and talk to Mar.  Please plan ahead and call during regular office hours if you need a refill.  Our office hours are Monday through Friday from 8:00 am until 4:30 pm.  Pain medication cannot be refilled in the evening or on weekends.  Narcotic pain medication is addictive and will not be prescribed on a long term basis.  Mar will be able to assist you in weaning from these medications.    Avoid the use of any non-steroidal anti-inflammatory medications (Advil, Aleve, Celebrex etc.) for 6 weeks after your surgery.  These medications inhibit bone growth and Dr. Camejo would prefer if you would avoid these medications.  If you do not need narcotic pain medication, Tylenol products would be a good alternative for mild pain control.      Activity:    You have a 20 pound lifting restriction for at least 6 weeks.  Dr. Camejo will inform you at your 6-week follow-up appointment if you can increase your weight limit.      Walking is your best activity.  Remember to walk daily and increase your distance and time as tolerated. Continue the daily isometric exercises that you were taught by the physical therapist while you were in the hospital.  This  "will help with the stiffness caused by the brace.    You cannot drive if you have taken narcotic pain medication within eight hours.  You may resume driving once you are not taking narcotic pain medication and you feel comfortable to operate your vehicle safely.    If you have any questions or concerns once you are home from the hospital, please call the office and speak with Dr. Camejo s nurse, Mar.  She can be reached at [phone].      Pending Results     No orders found for last 3 day(s).            Statement of Approval     Ordered          04/10/18 0831  I have reviewed and agree with all the recommendations and orders detailed in this document.  EFFECTIVE NOW     Approved and electronically signed by:  Jesus Camejo MD             Admission Information     Date & Time Provider Department Dept. Phone    4/9/2018 Jesus Camejo MD [place] [address] [facility] [phone]      Your Vitals Were     Blood Pressure Pulse Temperature Respirations Height Weight    123/61 (BP Location: Right arm) 75 97.8  F (36.6  C) (Oral) 20 1.499 m (4' 11\") 65.3 kg (144 lb)    Pulse Oximetry BMI (Body Mass Index)                98% 29.08 kg/m2          MyChart Information     [employer] gives you secure access to your electronic health record. If you see a primary care provider, you can also send messages to your care team and make appointments. If you have questions, please call your primary care clinic.  If you do not have a primary care provider, please call [phone] and they will assist you.        Care EveryWhere ID     This is your Care EveryWhere ID. This could be used by other organizations to access your [facility] medical records  [id number]        Equal Access to Services     [facility]: Irma Mcdowell, marci watt, qamelvin hawkins. So [facility] [phone].    ATENCIÓN: Si habla español, tiene a hyde disposición " servicios gratuitos de asistencia lingüística. Eileen lovett [phone].    We comply with applicable federal civil rights laws and [place] laws. We do not discriminate on the basis of race, color, national origin, age, disability, sex, sexual orientation, or gender identity.               Review of your medicines      START taking        Dose / Directions    HYDROcodone-acetaminophen 5-325 MG per tablet   Commonly known as:  NORCO   Used for:  Cervical radiculopathy        Dose:  1-2 tablet   Take 1-2 tablets by mouth every 4 hours as needed for moderate to severe pain   Quantity:  50 tablet   Refills:  0         CONTINUE these medicines which have NOT CHANGED        Dose / Directions    * albuterol (2.5 MG/3ML) 0.083% neb solution        Dose:  1 vial   Take 1 vial by nebulization every 6 hours as needed for shortness of breath / dyspnea or wheezing   Refills:  0       * albuterol 108 (90 BASE) MCG/ACT Inhaler   Commonly known as:  PROAIR HFA/PROVENTIL HFA/VENTOLIN HFA   Used for:  ILD (interstitial lung disease) (H)        Dose:  2 puff   Inhale 2 puffs into the lungs every 6 hours as needed for shortness of breath / dyspnea or wheezing   Quantity:  1 Inhaler   Refills:  11       gabapentin 300 MG capsule   Commonly known as:  NEURONTIN        Dose:  600 mg   Take 2 capsules (600 mg) by mouth 2 times daily   Refills:  0       ibuprofen 200 MG tablet   Commonly known as:  ADVIL/MOTRIN        Dose:  400 mg   Take 400 mg by mouth every 4 hours as needed for mild pain   Refills:  0       nebulizer/tubing/mouthpiece Kit   Used for:  Mild intermittent asthma        Dispense all necessary supplies for nebulizer machine   Quantity:  1 kit   Refills:  99       order for DME   Used for:  ILD (interstitial lung disease) (H)        Please provide patient with liquid oxygen with home fill system.  Patient requires 6 liters oxygen per minute via nasal canula with activity.  Please provide patient with oximyzer. This is for  lifetime use.   Quantity:  1 Device   Refills:  0       sennosides 8.6 MG tablet   Commonly known as:  SENOKOT        Dose:  1 tablet   Take 1 tablet by mouth daily as needed for constipation   Refills:  0       Vitamin D (Cholecalciferol) 400 UNITS Chew        Dose:  2 chew tab   Take 2 chew tab by mouth daily   Refills:  0       * Notice:  This list has 2 medication(s) that are the same as other medications prescribed for you. Read the directions carefully, and ask your doctor or other care provider to review them with you.         Where to get your medicines      Some of these will need a paper prescription and others can be bought over the counter. Ask your nurse if you have questions.     You don't need a prescription for these medications     HYDROcodone-acetaminophen 5-325 MG per tablet                Protect others around you: Learn how to safely use, store and throw away your medicines at www.disposemymeds.org.        Information about OPIOIDS     PRESCRIPTION OPIOIDS: WHAT YOU NEED TO KNOW    Prescription opioids can be used to help relieve moderate to severe pain and are often prescribed following a surgery or injury, or for certain health conditions. These medications can be an important part of treatment but also come with serious risks. It is important to work with your health care provider to make sure you are getting the safest, most effective care.    WHAT ARE THE RISKS AND SIDE EFFECTS OF OPIOID USE?  Prescription opioids carry serious risks of addiction and overdose, especially with prolonged use. An opioid overdose, often marked by slowed breathing can cause sudden death. The use of prescription opioids can have a number of side effects as well, even when taken as directed:      Tolerance - meaning you might need to take more of a medication for the same pain relief    Physical dependence - meaning you have symptoms of withdrawal when a medication is stopped    Increased sensitivity to  pain    Constipation    Nausea, vomiting, and dry mouth    Sleepiness and dizziness    Confusion    Depression    Low levels of testosterone that can result in lower sex drive, energy, and strength    Itching and sweating    RISKS ARE GREATER WITH:    History of drug misuse, substance use disorder, or overdose    Mental health conditions (such as depression or anxiety)    Sleep apnea    Older age (65 years or older)    Pregnancy    Avoid alcohol while taking prescription opioids.   Also, unless specifically advised by your health care provider, medications to avoid include:    Benzodiazepines (such as Xanax or Valium)    Muscle relaxants (such as Soma or Flexeril)    Hypnotics (such as Ambien or Lunesta)    Other prescription opioids    KNOW YOUR OPTIONS:  Talk to your health care provider about ways to manage your pain that do not involve prescription opioids. Some of these options may actually work better and have fewer risks and side effects:    Pain relievers such as acetaminophen, ibuprofen, and naproxen    Some medications that are also used for depression or seizures    Physical therapy and exercise    Cognitive behavioral therapy, a psychological, goal-directed approach, in which patients learn how to modify physical, behavioral, and emotional triggers of pain and stress    IF YOU ARE PRESCRIBED OPIOIDS FOR PAIN:    Never take opioids in greater amounts or more often than prescribed    Follow up with your primary health care provider and work together to create a plan on how to manage your pain.    Talk about ways to help manage your pain that do not involve prescription opioids    Talk about all concerns and side effects    Help prevent misuse and abuse    Never sell or share prescription opioids    Never use another person's prescription opioids    Store prescription opioids in a secure place and out of reach of others (this may include visitors, children, friends, and family)    Visit  www.cdc.gov/drugoverdose to learn about risks of opioid abuse and overdose    If you believe you may be struggling with addiction, tell your health care provider and ask for guidance or call [facility]'s National Helpline at 3-411-976-HELP    LEARN MORE / www.cdc.gov/drugoverdose/prescribing/guideline.html    Safely dispose of unused prescription opioids: Find your local drug take-back programs and more information about the importance of safe disposal at www.doseofreality.[place].gov             Medication List: This is a list of all your medications and when to take them. Check marks below indicate your daily home schedule. Keep this list as a reference.      Medications           Morning Afternoon Evening Bedtime As Needed    * albuterol (2.5 MG/3ML) 0.083% neb solution   Take 1 vial by nebulization every 6 hours as needed for shortness of breath / dyspnea or wheezing   Last time this was given:  2.5 mg on 4/9/2018  8:17 PM   Next Dose Due:  Every 6 hrs as needed                                   * albuterol 108 (90 BASE) MCG/ACT Inhaler   Commonly known as:  PROAIR HFA/PROVENTIL HFA/VENTOLIN HFA   Inhale 2 puffs into the lungs every 6 hours as needed for shortness of breath / dyspnea or wheezing   Next Dose Due:  Resume as ordered                                gabapentin 300 MG capsule   Commonly known as:  NEURONTIN   Take 2 capsules (600 mg) by mouth 2 times daily   Last time this was given:  600 mg on 4/10/2018  8:06 AM   Next Dose Due:  4/10/18                                   HYDROcodone-acetaminophen 5-325 MG per tablet   Commonly known as:  NORCO   Take 1-2 tablets by mouth every 4 hours as needed for moderate to severe pain   Next Dose Due:  As needed any time                                   ibuprofen 200 MG tablet   Commonly known as:  ADVIL/MOTRIN   Take 400 mg by mouth every 4 hours as needed for mild pain   Next Dose Due:  As needed any time                                   nebulizer/tubing/mouthpiece  Kit   Dispense all necessary supplies for nebulizer machine   Next Dose Due:  Resume as ordered                                order for DME   Please provide patient with liquid oxygen with home fill system.  Patient requires 6 liters oxygen per minute via nasal canula with activity.  Please provide patient with oximyzer. This is for lifetime use.                                sennosides 8.6 MG tablet   Commonly known as:  SENOKOT   Take 1 tablet by mouth daily as needed for constipation   Next Dose Due:  Resume as ordered                                Vitamin D (Cholecalciferol) 400 UNITS Chew   Take 2 chew tab by mouth daily   Next Dose Due:  4/11/18                                   * Notice:  This list has 2 medication(s) that are the same as other medications prescribed for you. Read the directions carefully, and ask your doctor or other care provider to review them with you.

## 2018-04-09 NOTE — ANESTHESIA PREPROCEDURE EVALUATION
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     Other chronic pain      Pain in joint, shoulder region     (hx of impingement both shoulders)     PONV (postoperative nausea and vomiting)      Right carotid bruit 11/3/2003     Sciatica     better with epidural steroid approx 2006     Past Surgical History:   Procedure Laterality Date     ABDOMEN SURGERY  20yrs plus    lap     ARTHROPLASTY SHOULDER  10/2010    right     ARTHROPLASTY SHOULDER  12/28/2011    Procedure:ARTHROPLASTY SHOULDER; Left Total Shoulder Arthroplasty  ; Surgeon:HERBERT LIAO; Location: OR     BIOPSY  2015    Vats proceedure for thymoma     BREAST SURGERY Left     breast cysts     C NONSPECIFIC PROCEDURE      L breast cysts      CARPAL TUNNEL RELEASE RT/LT      R carpal release     COLONOSCOPY       FUSION CERVICAL ANTERIOR ONE LEVEL      L c6-7 cervical disc with fusion     HC ESOPH/GAS REFLUX TEST W NASAL IMPED >1 HR N/A 9/22/2016    Procedure: ESOPHAGEAL IMPEDENCE FUNCTION TEST WITH 24 HOUR PH GREATER THAN 1 HOUR;  Surgeon: Bret Magdaleno MD;  Location: [facility]     HC LAPAROSCOPY, SURGICAL; CHOLECYSTECTOMY      lap choly 1990's     HEAD & NECK SURGERY       HYSTERECTOMY TOTAL ABDOMINAL, BILATERAL SALPINGO-OOPHORECTOMY, COMBINED       THORACOSCOPIC WEDGE RESECTION LUNG Right 3/24/2015    Procedure: THORACOSCOPIC WEDGE RESECTION LUNG;  Surgeon: Júnior Oakes MD;  Location:  OR     Social  History   Substance Use Topics     Smoking status: Never Smoker     Smokeless tobacco: Never Used      Comment: smoked very briefly as a teen     Alcohol use 0.6 oz/week     1 Standard drinks or equivalent per week      Comment: 1 glass of wine daily     Prior to Admission medications    Medication Sig Start Date End Date Taking? Authorizing Provider   Vitamin D, Cholecalciferol, 400 UNITS CHEW Take 2 chew tab by mouth daily   Yes Reported, Patient   albuterol (2.5 MG/3ML) 0.083% neb solution Take 1 vial by nebulization every 6 hours as needed for shortness of breath / dyspnea or wheezing   Yes Reported, Patient   sennosides (SENOKOT) 8.6 MG tablet Take 1 tablet by mouth daily as needed for constipation   Yes Reported, Patient   gabapentin (NEURONTIN) 300 MG capsule Take 2 capsules (600 mg) by mouth 2 times daily 4/4/18  Yes Oscar Delatorre MD   albuterol (PROAIR HFA, PROVENTIL HFA, VENTOLIN HFA) 108 (90 BASE) MCG/ACT inhaler Inhale 2 puffs into the lungs every 6 hours as needed for shortness of breath / dyspnea or wheezing 11/26/16  Yes Oscar Delatorre MD   ibuprofen (ADVIL,MOTRIN) 200 MG tablet Take 400 mg by mouth every 4 hours as needed for mild pain   Yes Reported, Patient   order for DME Please provide patient with liquid oxygen with home fill system.  Patient requires 6 liters oxygen per minute via nasal canula with activity.  Please provide patient with oximyzer. This is for lifetime use. 6/24/16   José Miguel Cruz MD   Respiratory Therapy Supplies (NEBULIZER/TUBING/MOUTHPIECE) KIT Dispense all necessary supplies for nebulizer machine 4/23/15   Love Ng MD     Current Facility-Administered Medications Ordered in Epic   Medication Dose Route Frequency Last Rate Last Dose     ceFAZolin (ANCEF) intermittent infusion 2 g in 100 mL dextrose PRE-MIX  2 g Intravenous Pre-Op/Pre-procedure x 1 dose         ceFAZolin (ANCEF) 1g in 10 mL SWFI premix for IVP  1 g Intravenous See Admin Instructions          lidocaine 1 % 1 mL  1 mL Other Q1H PRN   0.5 mL at 18 1020     lactated ringers infusion   Intravenous Continuous 25 mL/hr at 18 1020       No current Epic-ordered outpatient prescriptions on file.       lactated ringers 25 mL/hr at 18 1020     Wt Readings from Last 1 Encounters:   18 65.3 kg (144 lb)     Temp Readings from Last 1 Encounters:   18 36.4  C (97.6  F) (Temporal)     BP Readings from Last 6 Encounters:   18 147/86   18 122/58   16 126/64   16 118/56   11/10/16 128/81   16 105/54     Pulse Readings from Last 4 Encounters:   18 97   18 98   16 106   16 93     Resp Readings from Last 1 Encounters:   18 16     SpO2 Readings from Last 1 Encounters:   18 99%     Recent Labs   Lab Test  18   1524  16   1440   NA  141  141   POTASSIUM  4.2  4.1   CHLORIDE  104  104   CO2  31  26   ANIONGAP  6  11   GLC  99  132*   BUN  16  13   CR  0.95  0.94   JESSIE  9.2  8.7     Recent Labs   Lab Test  18   1524  16   1440   AST  19  15   ALT  22  20   ALKPHOS  72  55   BILITOTAL  0.2  0.2     Recent Labs   Lab Test  18   1524  16   1440   WBC  6.5  8.9   HGB  11.0*  9.8*   PLT  291  247     Recent Labs   Lab Test  03/09/15   1658   ABO  B   RH   Neg     Recent Labs   Lab Test  16   0806  03/24/15   0615  10/04/10   1000   INR  0.89  0.90  0.95   PTT  <20  Documentation to follow   Critical Value called to and read back by  SHAREE GÓMEZ MD,MK  *   --   29      Recent Labs   Lab Test  16   0515  14   1354   TROPI  <0.015  The 99th percentile for upper reference range is 0.045 ug/L.  Troponin values in   the range of 0.045 - 0.120 ug/L may be associated with risks of adverse   clinical events.    <0.012       RECENT LABS:   EC/18- NSR    Anesthesia Evaluation     . Pt has had prior anesthetic. Type: General    History of anesthetic complications   - PONV        ROS/MED  HX    ENT/Pulmonary:     (+)Moderate Persistent asthma Treatment: Inhaler prn,  , . Other pulmonary disease Interstital pneumonitis (chronic) - 4 L O2/DAY, 3 L QHS.   (-) sleep apnea   Neurologic:  - neg neurologic ROS     Cardiovascular:  - neg cardiovascular ROS       METS/Exercise Tolerance:     Hematologic:  - neg hematologic  ROS       Musculoskeletal:   (+) arthritis, , , -       GI/Hepatic:  - neg GI/hepatic ROS      (-) GERD   Renal/Genitourinary:  - ROS Renal section negative       Endo:         Psychiatric:  - neg psychiatric ROS       Infectious Disease:  - neg infectious disease ROS       Malignancy:         Other:    (+) H/O Chronic Pain,                   Physical Exam  Normal systems: dental    Airway   Mallampati: III  TM distance: >3 FB  Neck ROM: limited    Dental   (+) caps    Cardiovascular   Rhythm and rate: regular and normal      Pulmonary    breath sounds clear to auscultation                    Anesthesia Plan      History & Physical Review  History and physical reviewed and following examination; no interval change.    ASA Status:  2 .    NPO Status:  > 8 hours    Plan for General and ETT with Intravenous and Propofol induction. Maintenance will be Balanced.    PONV prophylaxis:  Ondansetron (or other 5HT-3) and Dexamethasone or Solumedrol       Postoperative Care  Postoperative pain management:  Multi-modal analgesia.      Consents  Anesthetic plan, risks, benefits and alternatives discussed with:  Patient.  Use of blood products discussed: Yes.   Use of blood products discussed with Patient.  Consented to blood products.  .                          .

## 2018-04-09 NOTE — IP AVS SNAPSHOT
[address] [facility]    [address] [place] [address]    Phone:  [phone]                                       After Visit Summary   4/9/2018    Wanda Kaur    MRN: [id number]           After Visit Summary Signature Page     I have received my discharge instructions, and my questions have been answered. I have discussed any challenges I see with this plan with the nurse or doctor.    ..........................................................................................................................................  Patient/Patient Representative Signature      ..........................................................................................................................................  Patient Representative Print Name and Relationship to Patient    ..................................................               ................................................  Date                                            Time    ..........................................................................................................................................  Reviewed by Signature/Title    ...................................................              ..............................................  Date                                                            Time

## 2018-04-09 NOTE — BRIEF OP NOTE
[facility] Brief Operative Note    Pre-operative diagnosis: CERVICAL RADICULOPATHY   Post-operative diagnosis same   Procedure: Procedure(s):  POSTERIOR CERVICAL FORAMINOTOMY C7-T1 - Wound Class: I-Clean   Surgeon(s): Surgeon(s) and Role:     * Jesus Camejo MD - Primary   Estimated blood loss: 10 mL    Specimens: none *   Findings: See dictated op note

## 2018-04-09 NOTE — ANESTHESIA POSTPROCEDURE EVALUATION
Patient: Wanda Kaur    Procedure(s):  POSTERIOR CERVICAL FORAMINOTOMY C7-T1 - Wound Class: I-Clean    Diagnosis:CERVICAL RADICULOPATHY  Diagnosis Additional Information: No value filed.    Anesthesia Type:  General, ETT    Note:  Anesthesia Post Evaluation    Patient location during evaluation: PACU  Patient participation: Able to fully participate in evaluation  Level of consciousness: awake and alert  Pain management: adequate  Airway patency: patent  Cardiovascular status: acceptable  Respiratory status: acceptable  Hydration status: acceptable  PONV: none     Anesthetic complications: None          Last vitals:  Vitals:    04/09/18 1450 04/09/18 1500 04/09/18 1510   BP: 124/63 129/62 126/64   Pulse:      Resp: 14 18 17   Temp: 36.1  C (96.9  F)     SpO2: 98% 99% 97%         Electronically Signed By: Aj Resendiz MD  April 9, 2018  3:23 PM

## 2018-04-09 NOTE — OP NOTE
Procedure Date: 04/09/2018      SURGEON:  Jesus Camejo MD.      ASSISTANT:  CASPER Ford.      PREOPERATIVE DIAGNOSIS:  Classic right C8 radiculopathy.      POSTOPERATIVE DIAGNOSIS:  Classic right C8 radiculopathy.      PROCEDURE:  Laminoforaminotomy, right C7-T1.        PREOPERATIVE INDICATIONS:  This 72-year-old woman presents with a rather severe weakness in her classic right C8 distribution with essentially nearly nonfunctional flexor carpi ulnaris intrinsic and first dorsal interossei/intrinsic pinch strength.  Preoperative studies demonstrated obvious foraminal stenosis subjacent to a technically satisfactory remote anterior fusion.  Based on persistence of neck, arm and shoulder pain associated with profound weakness, she opted for operative management.      The patient was taken to the Operating Room, given satisfactory general endotracheal anesthetic, was appropriately positioned, prepared and draped for posterior cervical surgery in a prone position.  Great care was used to place the neck in a slight amount of flexion.  She was rather short statured.  This would have been a challenging exposure, limited exposure to cervicothoracic junction given the position of her shoulders.  Nonetheless, in the seated position reasonably good visualization of the C7 spinous process was achieved and helped localize the placement of the incision.      The posterior aspect of the neck was widely and aseptically prepped and draped.  Standard timeout was accomplished.  Intravenous antibiotics were assured.      A unilateral subperiosteal reflection of the posterior elements of C7 and ultimately T1 was carried out.  Limited exposure was carried out until a Kocher clamp was able to be placed on the prominent spinous process where 3 independent observers reached the same conclusion that this was C7.  C7 was indelibly marked and we keyed the exposure off that.  We developed the unilateral exposure and placed the  retractors in an upside down fashion as she sat so low on the OR table that there was no room to place the retractors in the standard fashion.  A unilateral subperiosteal exposure was carried out to the level of the lateral masses sufficient to allow for identifying the facet joint and the confluence of the trailing edge of C7 and the leading edge of T1.  We developed a confluence there with a motorized dissection and a side cutting bur.  We removed minimal amounts of bone material, in that only sufficient to identify both the axilla and the shoulder of the nerve root.  The C8 nerve was identified for protection and stability sake, we left the lateral edge of the ligamentum flavum as is.  We identified the C8 nerve root as it coursed through the foramen.  There was clearly fairly impressive inflammatory changes over the takeoff of the nerve root.  We decompressed both the axilla and the shoulder and the posterior aspect of the nerve root to approximately 40% facetectomy until we could undermine and tunnel the foramen laterally so as to verify that the nerve root passed freely through its foramen without extrinsic pressure.  We carefully mobilized the inferior aspect of the nerve, but there was no obvious free fragment present.  Most of the changes appeared to be a combination of lateral bulge at the disk spaces associated with some degrees of facet impingement.  At the termination of decompression, the nerve appeared to course freely through its foramen without extrinsic pressure.  We thoroughly washed out the wound and began closure by anatomically reapproximating the paraspinal muscle to their fascial insertions followed by running along the ligamentum nuchae, 2-0 in the subcutaneous tissue and 3-0 Prolene in the skin completed the operation.  A suction drain was placed over the exposed lamina for drainage purposes.  The anesthesia was discontinued and she was taken to the operating room in stable and satisfactory  condition having tolerated the procedure well.  The blood loss was at most 10 mL.  Sponge and needle count correct.  There were no intraoperative or technical complications.  Everything went quite smoothly.      Final clinical diagnosis is that of obvious foraminal stenosis with marked nerve inflammation and compression requiring laminoforaminotomy.      Nurse Mar Roberts was in attendance at all times.  She was critical to the safe and effective performance of this procedure.  Her tasks included protection, retraction and mobilization of neurovascular and visceral structures, and she was essential to the safe and timely performance of this procedure.         CARMELO ESQUIVEL MD             D: 2018   T: 2018   MT: SHARONDA      Name:     CECILIO DE LEON   MRN:      [id number]        Account:        [id number]   :      1945           Procedure Date: 2018      Document: [id number]

## 2018-04-09 NOTE — ANESTHESIA CARE TRANSFER NOTE
Patient: Wanda Kaur    Procedure(s):  POSTERIOR CERVICAL FORAMINOTOMY C7-T1 - Wound Class: I-Clean    Diagnosis: CERVICAL RADICULOPATHY  Diagnosis Additional Information: No value filed.    Anesthesia Type:   General, ETT     Note:  Airway :Nasal Cannula  Patient transferred to:PACU  Handoff Report: Identifed the Patient, Identified the Reponsible Provider, Reviewed the pertinent medical history, Discussed the surgical course, Reviewed Intra-OP anesthesia mangement and issues during anesthesia, Set expectations for post-procedure period and Allowed opportunity for questions and acknowledgement of understanding      Vitals: (Last set prior to Anesthesia Care Transfer)    CRNA VITALS  4/9/2018 1325 - 4/9/2018 1405      4/9/2018             NIBP: 127/62    NIBP Mean: 87                Electronically Signed By: SEAN Alfaro CRNA  April 9, 2018  2:05 PM

## 2018-04-09 NOTE — PROGRESS NOTES
Admission medication history interview status for the 4/9/2018  admission is complete. See EPIC admission navigator for prior to admission medications     Medication history source reliability:Good    Medication history interview source(s):Patient    Medication history resources (including written lists, pill bottles, clinic record):None    Primary pharmacy.[facility]    Additional medication history information not noted on PTA med list :None    Time spent in this activity: 40 minutes    Prior to Admission medications    Medication Sig Last Dose Taking? Auth Provider   Vitamin D, Cholecalciferol, 400 UNITS CHEW Take 2 chew tab by mouth daily 4/8/2018 at AM Yes Reported, Patient   albuterol (2.5 MG/3ML) 0.083% neb solution Take 1 vial by nebulization every 6 hours as needed for shortness of breath / dyspnea or wheezing More than a Month at PRN Yes Reported, Patient   sennosides (SENOKOT) 8.6 MG tablet Take 1 tablet by mouth daily as needed for constipation Past Week at PRN Yes Reported, Patient   gabapentin (NEURONTIN) 300 MG capsule Take 2 capsules (600 mg) by mouth 2 times daily 4/8/2018 at PM Yes Oscar Delatorre MD   albuterol (PROAIR HFA, PROVENTIL HFA, VENTOLIN HFA) 108 (90 BASE) MCG/ACT inhaler Inhale 2 puffs into the lungs every 6 hours as needed for shortness of breath / dyspnea or wheezing 4/9/2018 at AM Yes Oscar Delatorre MD   ibuprofen (ADVIL,MOTRIN) 200 MG tablet Take 400 mg by mouth every 4 hours as needed for mild pain 4/2/2018 at PRN Yes Reported, Patient   order for DME Please provide patient with liquid oxygen with home fill system.  Patient requires 6 liters oxygen per minute via nasal canula with activity.  Please provide patient with oximyzer. This is for lifetime use.   José Miguel Cruz MD   Respiratory Therapy Supplies (NEBULIZER/TUBING/MOUTHPIECE) KIT Dispense all necessary supplies for nebulizer machine   Love Ng MD

## 2018-04-10 VITALS
SYSTOLIC BLOOD PRESSURE: 123 MMHG | HEIGHT: 59 IN | DIASTOLIC BLOOD PRESSURE: 61 MMHG | HEART RATE: 75 BPM | OXYGEN SATURATION: 98 % | BODY MASS INDEX: 29.03 KG/M2 | RESPIRATION RATE: 20 BRPM | WEIGHT: 144 LBS | TEMPERATURE: 97.8 F

## 2018-04-10 LAB — GLUCOSE BLDC GLUCOMTR-MCNC: 101 MG/DL (ref 70–99)

## 2018-04-10 PROCEDURE — A9270 NON-COVERED ITEM OR SERVICE: HCPCS | Mod: GY | Performed by: ORTHOPAEDIC SURGERY

## 2018-04-10 PROCEDURE — 25000132 ZZH RX MED GY IP 250 OP 250 PS 637: Mod: GY | Performed by: ORTHOPAEDIC SURGERY

## 2018-04-10 PROCEDURE — 82962 GLUCOSE BLOOD TEST: CPT

## 2018-04-10 PROCEDURE — 25000128 H RX IP 250 OP 636: Performed by: ORTHOPAEDIC SURGERY

## 2018-04-10 RX ORDER — HYDROCODONE BITARTRATE AND ACETAMINOPHEN 5; 325 MG/1; MG/1
1 TABLET ORAL EVERY 6 HOURS PRN
Status: DISCONTINUED | OUTPATIENT
Start: 2018-04-10 | End: 2018-04-10 | Stop reason: HOSPADM

## 2018-04-10 RX ORDER — HYDROCODONE BITARTRATE AND ACETAMINOPHEN 5; 325 MG/1; MG/1
1-2 TABLET ORAL EVERY 4 HOURS PRN
Qty: 50 TABLET | Refills: 0
Start: 2018-04-10 | End: 2018-05-02

## 2018-04-10 RX ADMIN — KETOROLAC TROMETHAMINE 15 MG: 15 INJECTION, SOLUTION INTRAMUSCULAR; INTRAVENOUS at 06:29

## 2018-04-10 RX ADMIN — GABAPENTIN 600 MG: 300 CAPSULE ORAL at 08:06

## 2018-04-10 RX ADMIN — CHOLECALCIFEROL TAB 10 MCG (400 UNIT) 800 UNITS: 10 TAB at 08:09

## 2018-04-10 RX ADMIN — KETOROLAC TROMETHAMINE 15 MG: 15 INJECTION, SOLUTION INTRAMUSCULAR; INTRAVENOUS at 00:10

## 2018-04-10 NOTE — PLAN OF CARE
Problem: Surgery Nonspecified (Adult)  Goal: Signs and Symptoms of Listed Potential Problems Will be Absent, Minimized or Managed (Surgery Nonspecified)  Signs and symptoms of listed potential problems will be absent, minimized or managed by discharge/transition of care (reference Surgery Nonspecified (Adult) CPG).  Outcome: No Change  A&O X4. Up from pacu @ 1600. CMS intact. Drg CDI. hmv patent, minimal o/p. Pain is being managed with toredol. A-2 with GB. voiding adequately per BSC. Tolerating clears, nausea improved with meds. VSS on 4L NC, pt wears 4L during day @ baseline and 3L @ noc. co2 levels lower from baseline, resp paged to assess, pt denies SOB or resp distress.

## 2018-04-10 NOTE — DISCHARGE INSTRUCTIONS
Discharge Instructions following Cervical Spine Surgery      Wound Care:    Your incision(s) are closed with a suture that will need to be removed.  You will have steri strips (butterfly tapes) across your incision(s).  Leave the steri strips in place until you come to the office to have your sutures removed.  You will need to make an appointment for 1 to 2 weeks following your surgery to have your suture(s) removed and to have an x-ray check.  Please call [phone] to make an appointment if you haven t already done so.      You do not need to cover your incisions with plastic when you are showering, once you are home from the hospital.  Let the water run over your incision when in the shower, then pat the incision dry.  Cover your incision with a clean bandage after you shower to keep you incision clean until your sutures are removed.    If you had bone graft taken from your iliac crest for your procedure, do not take a bath or sit in a hot tub until your sutures are removed and your incision is well healed.    Pain Medication:    You will be sent home with pain medication from the hospital.  Gradually decrease your usage of the pain medication as you start to feel better.  If you are in need of a refill of pain medication, please call the office and talk to Mar.  Please plan ahead and call during regular office hours if you need a refill.  Our office hours are Monday through Friday from 8:00 am until 4:30 pm.  Pain medication cannot be refilled in the evening or on weekends.  Narcotic pain medication is addictive and will not be prescribed on a long term basis.  Mar will be able to assist you in weaning from these medications.    Avoid the use of any non-steroidal anti-inflammatory medications (Advil, Aleve, Celebrex etc.) for 6 weeks after your surgery.  These medications inhibit bone growth and Dr. Camejo would prefer if you would avoid these medications.  If you do not need narcotic pain medication,  Tylenol products would be a good alternative for mild pain control.      Activity:    You have a 20 pound lifting restriction for at least 6 weeks.  Dr. aCmejo will inform you at your 6-week follow-up appointment if you can increase your weight limit.      Walking is your best activity.  Remember to walk daily and increase your distance and time as tolerated. Continue the daily isometric exercises that you were taught by the physical therapist while you were in the hospital.  This will help with the stiffness caused by the brace.    You cannot drive if you have taken narcotic pain medication within eight hours.  You may resume driving once you are not taking narcotic pain medication and you feel comfortable to operate your vehicle safely.    If you have any questions or concerns once you are home from the hospital, please call the office and speak with Dr. Camejo s nurse, Mar.  She can be reached at [phone].

## 2018-04-10 NOTE — PROGRESS NOTES
POD #1   Doing well.   Neuro intact, voice ok, swallowing ok  No arm pain, good strength.  C8 is clearly improved over pre op   Drain typical   Plan- mobilization, po pain medication, PT, discharge likely today  Jesus Camejo MD

## 2018-04-10 NOTE — PLAN OF CARE
Problem: Surgery Nonspecified (Adult)  Goal: Signs and Symptoms of Listed Potential Problems Will be Absent, Minimized or Managed (Surgery Nonspecified)  Signs and symptoms of listed potential problems will be absent, minimized or managed by discharge/transition of care (reference Surgery Nonspecified (Adult) CPG).   Outcome: Improving  Pt. A&o, vss, up with SBA, voiding adequate amount, denied any pain, dressing changed and c dc'd, d/c instruction reviewed, script for norco given and pt. D/c to home with spouse at 1125.

## 2018-04-10 NOTE — PLAN OF CARE
Problem: Patient Care Overview  Goal: Plan of Care/Patient Progress Review  Outcome: Improving  Pt A/O x4. 1A GB, walker, to BSC. Soft collar to be warn on neck. VSS on RA. CMS+ Intermittent pain controlled w/ Toradol. Iso precautions maintained. Tolerated clear and now advancing to Reg diet. IV SL. Denies pain, some c/o HA with relief from Ultram. Plan is to discharge today. Continue to monitor.

## 2018-04-10 NOTE — PLAN OF CARE
Problem: Chronic Respiratory Difficulty Comorbidity  Goal: Chronic Respiratory Difficulty  Patient comorbidity will be monitored for signs and symptoms of Respiratory Difficulty (Chronic) condition.  Problems will be absent, minimized or managed by discharge/transition of care.  Outcome: Declining  CO2 levels decreased from baseline of 30's, paged respiratory for neb and to assess pt.

## 2018-04-12 PROBLEM — M54.2 CERVICAL PAIN: Status: RESOLVED | Noted: 2018-03-13 | Resolved: 2018-04-12

## 2018-04-12 PROBLEM — M54.12 CERVICAL RADICULOPATHY: Status: RESOLVED | Noted: 2018-03-13 | Resolved: 2018-04-12

## 2018-04-12 NOTE — PROGRESS NOTES
Update as of April 12, 2018: Patient has failed to return to clinic. Medical chart indicates that the patient underwent surgical foraminotomy. This progress note shall serve as a discharge note.

## 2018-04-20 ENCOUNTER — OFFICE VISIT (OUTPATIENT)
Dept: PEDIATRICS | Facility: CLINIC | Age: 73
End: 2018-04-20
Payer: COMMERCIAL

## 2018-04-20 ENCOUNTER — TELEPHONE (OUTPATIENT)
Dept: PEDIATRICS | Facility: CLINIC | Age: 73
End: 2018-04-20

## 2018-04-20 VITALS
DIASTOLIC BLOOD PRESSURE: 64 MMHG | HEART RATE: 88 BPM | WEIGHT: 146 LBS | BODY MASS INDEX: 29.43 KG/M2 | TEMPERATURE: 97.7 F | OXYGEN SATURATION: 99 % | HEIGHT: 59 IN | SYSTOLIC BLOOD PRESSURE: 120 MMHG

## 2018-04-20 DIAGNOSIS — T45.1X5A METHOTREXATE LUNG: ICD-10-CM

## 2018-04-20 DIAGNOSIS — J84.9 ILD (INTERSTITIAL LUNG DISEASE) (H): Primary | ICD-10-CM

## 2018-04-20 DIAGNOSIS — J98.4 METHOTREXATE LUNG: ICD-10-CM

## 2018-04-20 PROCEDURE — 99214 OFFICE O/P EST MOD 30 MIN: CPT | Mod: GW | Performed by: INTERNAL MEDICINE

## 2018-04-20 NOTE — PROGRESS NOTES
SUBJECTIVE:   Wanda Kaur is a 72 year old female who presents to clinic today for the following health issues:      Follow up    -Patient needs to be certified to obtain home oxygen from [facility] [place].     Walked with patient and her oxygen dropped down to 79.     ILD: she has had interestitial lung disease since 2016. She has bee on 4 L of oxygen therapy at home. Oxygen will drop down to mid 80'% with exertion on 3L but will stay above 90% on 4L. She feels that her breathing has been stable. No increased wheezing or shortness of breath. Will occasionally use morphine if having shortness of breath. Uses albuterol 2-3 times per day with some help. Had been evaluated by pulmonology and treated with rituximab and cell cept. She has been off all medications and feels that things are stable.     Had surgery on her cervical spine, healing well. Did well with the surgery without any acute concerns.     Problem list and histories reviewed & adjusted, as indicated.  Additional history: as documented    Patient Active Problem List   Diagnosis     Other symptoms involving cardiovascular system     Enthesopathy of hip region     Osteoarthrosis, hand     CARDIOVASCULAR SCREENING; LDL GOAL LESS THAN 160     Advance Care Planning     Mediastinal mass     Enthesopathy of hip region, unspecified laterality     Neck pain     Rheumatoid arthritis, involving unspecified site, unspecified rheumatoid factor presence (H)     Immunosuppression (H)     Pneumonia of both upper lobes     Hypoxia     Health Care Home     Pneumonia of both lungs due to methicillin resistant Staphylococcus aureus (MRSA), unspecified part of lung (H)     Methotrexate lung (H)     Acute respiratory failure with hypoxia (H)     ILD (interstitial lung disease) (H)     Sick-euthyroid syndrome     Anemia, unspecified type     Abnormal CT of the chest     Status post coronary angiogram     Past Surgical History:   Procedure Laterality Date     ABDOMEN SURGERY   20yrs plus    lap     ARTHROPLASTY SHOULDER  10/2010    right     ARTHROPLASTY SHOULDER  2011    Procedure:ARTHROPLASTY SHOULDER; Left Total Shoulder Arthroplasty  ; Surgeon:HERBERT LIAO; Location:[facility] OR     BIOPSY      Vats proceedure for thymoma     BREAST SURGERY Left     breast cysts     C NONSPECIFIC PROCEDURE      L breast cysts      CARPAL TUNNEL RELEASE RT/LT      R carpal release     COLONOSCOPY       FORAMINOTOMY CERVICAL POSTERIOR TWO LEVELS N/A 2018    Procedure: FORAMINOTOMY CERVICAL POSTERIOR TWO LEVELS;  POSTERIOR CERVICAL FORAMINOTOMY C7-T1;  Surgeon: Jesus Camejo MD;  Location: [facility] OR     FUSION CERVICAL ANTERIOR ONE LEVEL      L c6-7 cervical disc with fusion     HC ESOPH/GAS REFLUX TEST W NASAL IMPED >1 HR N/A 2016    Procedure: ESOPHAGEAL IMPEDENCE FUNCTION TEST WITH 24 HOUR PH GREATER THAN 1 HOUR;  Surgeon: Bret Magdaleno MD;  Location: [facility]     HC LAPAROSCOPY, SURGICAL; CHOLECYSTECTOMY      lap choly      HEAD & NECK SURGERY       HYSTERECTOMY TOTAL ABDOMINAL, BILATERAL SALPINGO-OOPHORECTOMY, COMBINED       THORACOSCOPIC WEDGE RESECTION LUNG Right 3/24/2015    Procedure: THORACOSCOPIC WEDGE RESECTION LUNG;  Surgeon: Júnior Oakes MD;  Location:  OR       Social History   Substance Use Topics     Smoking status: Never Smoker     Smokeless tobacco: Never Used      Comment: smoked very briefly as a teen     Alcohol use 0.6 oz/week     1 Standard drinks or equivalent per week      Comment: 1 glass of wine daily     Family History   Problem Relation Age of Onset     Respiratory Mother      COPD  at 64     HEART DISEASE Father       of CVA at 81     CEREBROVASCULAR DISEASE Maternal Grandmother      CEREBROVASCULAR DISEASE Paternal Grandmother            Reviewed and updated as needed this visit by clinical staff       Reviewed and updated as needed this visit by Provider         ROS:  Constitutional, HEENT, cardiovascular, pulmonary,  "GI, , musculoskeletal, neuro, skin, endocrine and psych systems are negative, except as otherwise noted.    OBJECTIVE:     /64 (BP Location: Right arm, Cuff Size: Adult Regular)  Pulse 88  Temp 97.7  F (36.5  C) (Oral)  Ht 4' 11\" (1.499 m)  Wt 146 lb (66.2 kg)  SpO2 99%  BMI 29.49 kg/m2  Body mass index is 29.49 kg/(m^2).     Oxygen sats at rest 79% on RA  Oxygen sats with exertion 79% on RA  Oxygen sats at 2 L 85% with ambulation  Oxygen sats at 4L 99% with ambulation    GENERAL: healthy, alert and no distress  EYES: Eyes grossly normal to inspection, PERRL and conjunctivae and sclerae normal  NECK: no adenopathy, no asymmetry, masses, or scars and thyroid normal to palpation  RESP: On oxygen 79% on RA, easy work of breathing on 4L O2. Crackles and rhonchi bilaterlaly.   CV: regular rate and rhythm, normal S1 S2, no S3 or S4, no murmur, click or rub, no peripheral edema and peripheral pulses strong  ABDOMEN: soft, nontender, no hepatosplenomegaly, no masses and bowel sounds normal  MS: no gross musculoskeletal defects noted, no edema  NEURO: Normal strength and tone, mentation intact and speech normal  PSYCH: mentation appears normal, affect normal/bright    Diagnostic Test Results:  none     ASSESSMENT/PLAN:       ICD-10-CM    1. ILD (interstitial lung disease) (H) J84.9 order for DME   2. 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 Let us know if you don't hear anything from them.        Oscar Delatorre MD, MD  [facility]    Addendum:     Also from nurse only visit      Nurse only appt  4/24:   1. Room air at rest.                         91%     2. Room air while ambulating.                         82%     3. Ambulating with oxygen applied at 2 Liters                         84%   Patient very winded and short of breath upon returning to the exam room.      4. Ambulating with oxygen applied at 4 " Liters.                        85%  Patient very wined and short of breath begin to be slight dizzy.  Starting O2 98%,  returning to room O2 dropped to 85%  While on 4 liters of oxygen.

## 2018-04-20 NOTE — MR AVS SNAPSHOT
After Visit Summary   4/20/2018    Wanda Kaur    MRN: [id number]           Patient Information     Date Of Birth          1945        Visit Information        Provider Department      4/20/2018 9:50 AM Oscar Delatorre MD [facility]        Care Instructions    We will send information in for oxygen. Let us know if you don't hear anything from them.          Follow-ups after your visit        Follow-up notes from your care team     Return in about 6 weeks (around 6/1/2018), or if symptoms worsen or fail to improve.      Who to contact     If you have questions or need follow up information about today's clinic visit or your schedule please contact [facility] directly at [phone].  Normal or non-critical lab and imaging results will be communicated to you by [employer], letter or phone within 4 business days after the clinic has received the results. If you do not hear from us within 7 days, please contact the clinic through [employer] or phone. If you have a critical or abnormal lab result, we will notify you by phone as soon as possible.  Submit refill requests through [email or web address] or call your pharmacy and they will forward the refill request to us. Please allow 3 business days for your refill to be completed.          Additional Information About Your Visit        MyChart Information     [email or web address] gives you secure access to your electronic health record. If you see a primary care provider, you can also send messages to your care team and make appointments. If you have questions, please call your primary care clinic.  If you do not have a primary care provider, please call [phone] and they will assist you.        Care EveryWhere ID     This is your Care EveryWhere ID. This could be used by other organizations to access your [facility] medical records  [id number]        Your Vitals Were     Pulse Temperature Height Pulse Oximetry BMI (Body Mass Index)       88 97.7  " F (36.5  C) (Oral) 4' 11\" (1.499 m) 99% 29.49 kg/m2        Blood Pressure from Last 3 Encounters:   04/20/18 120/64   04/10/18 123/61   04/04/18 122/58    Weight from Last 3 Encounters:   04/20/18 146 lb (66.2 kg)   04/09/18 144 lb (65.3 kg)   04/04/18 146 lb (66.2 kg)              Today, you had the following     No orders found for display       Primary Care Provider Office Phone # Fax #    Oscar Delatorre -[phone] [address] [place] [address]        Equal Access to Services     [facility]: Irma Mcdowell, marci watt, usha dudley, melvin jones . So [facility] [phone].    ATENCIÓN: Si habla español, tiene a hyde disposición servicios gratuitos de asistencia lingüística. Llame al [phone].    We comply with applicable federal civil rights laws and [place] laws. We do not discriminate on the basis of race, color, national origin, age, disability, sex, sexual orientation, or gender identity.            Thank you!     Thank you for choosing [facility]  for your care. Our goal is always to provide you with excellent care. Hearing back from our patients is one way we can continue to improve our services. Please take a few minutes to complete the written survey that you may receive in the mail after your visit with us. Thank you!             Your Updated Medication List - Protect others around you: Learn how to safely use, store and throw away your medicines at www.disposemymeds.org.          This list is accurate as of 4/20/18 10:41 AM.  Always use your most recent med list.                   Brand Name Dispense Instructions for use Diagnosis    * albuterol (2.5 MG/3ML) 0.083% neb solution      Take 1 vial by nebulization every 6 hours as needed for shortness of breath / dyspnea or wheezing        * albuterol 108 (90 Base) MCG/ACT Inhaler    PROAIR HFA/PROVENTIL HFA/VENTOLIN HFA    1 Inhaler    " Inhale 2 puffs into the lungs every 6 hours as needed for shortness of breath / dyspnea or wheezing    ILD (interstitial lung disease) (H)       gabapentin 300 MG capsule    NEURONTIN     Take 2 capsules (600 mg) by mouth 2 times daily        HYDROcodone-acetaminophen 5-325 MG per tablet    NORCO    50 tablet    Take 1-2 tablets by mouth every 4 hours as needed for moderate to severe pain    Cervical radiculopathy       ibuprofen 200 MG tablet    ADVIL/MOTRIN     Take 400 mg by mouth every 4 hours as needed for mild pain        nebulizer/tubing/mouthpiece Kit     1 kit    Dispense all necessary supplies for nebulizer machine    Mild intermittent asthma       order for DME     1 Device    Please provide patient with liquid oxygen with home fill system.  Patient requires 6 liters oxygen per minute via nasal canula with activity.  Please provide patient with oximyzer. This is for lifetime use.    ILD (interstitial lung disease) (H)       sennosides 8.6 MG tablet    SENOKOT     Take 1 tablet by mouth daily as needed for constipation        Vitamin D (Cholecalciferol) 400 units Chew      Take 2 chew tab by mouth daily        * Notice:  This list has 2 medication(s) that are the same as other medications prescribed for you. Read the directions carefully, and ask your doctor or other care provider to review them with you.

## 2018-04-20 NOTE — TELEPHONE ENCOUNTER
[facility] Oxygen order faxed  Along with 4/4/2018 OV notes.    Forms with Rx placed in PCP faxed bin.    Sue LONG RN, BSN, PHN  [place] Flex RN

## 2018-04-20 NOTE — LETTER
My Asthma Action Plan  Name: Wanda Kaur   YOB: 1945  Date: 4/20/2018   My doctor: Oscar Delatorre MD, MD   My clinic: [facility]   Good Control    I feel good    No cough or wheeze    Can work, sleep and play without asthma symptoms       Take your asthma control medicine every day.     1. If exercise triggers your asthma, take your rescue medication    15 minutes before exercise or sports, and    During exercise if you have asthma symptoms  2. Spacer to use with inhaler: If you have a spacer, make sure to use it with your inhaler             YELLOW ZONE Getting Worse  I have ANY of these:    I do not feel good    Cough or wheeze    Chest feels tight    Wake up at night   1. Keep taking your Green Zone medications  2. Start taking your rescue medicine:    every 20 minutes for up to 1 hour. Then every 4 hours for 24-48 hours.  3. If you stay in the Yellow Zone for more than 12-24 hours, contact your doctor.  4. If you do not return to the Green Zone in 12-24 hours or you get worse, start taking your oral steroid medicine if prescribed by your provider.           RED ZONE Medical Alert - Get Help  I have ANY of these:    I feel awful    Medicine is not helping    Breathing getting harder    Trouble walking or talking    Nose opens wide to breathe       1. Take your rescue medicine NOW  2. If your provider has prescribed an oral steroid medicine, start taking it NOW  3. Call your doctor NOW  4. If you are still in the Red Zone after 20 minutes and you have not reached your doctor:    Take your rescue medicine again and    Call 911 or go to the emergency room right away    See your regular doctor within 2 weeks of an Emergency Room or Urgent Care visit for follow-up treatment.          Annual Reminders:  Meet with Asthma Educator,  Flu Shot in the Fall, consider Pneumonia Vaccination for patients with asthma (aged 19 and older).    Pharmacy:     [facility] STORE [address] [facility] [address] [facility] [address]  [facility] [place] [facility] [place], [place] - [address]                      Asthma Triggers  How To Control Things That Make Your Asthma Worse    Triggers are things that make your asthma worse.  Look at the list below to help you find your triggers and what you can do about them.  You can help prevent asthma flare-ups by staying away from your triggers.      Trigger                                                          What you can do   Cigarette Smoke  Tobacco smoke can make asthma worse. Do not allow smoking in your home, car or around you.  Be sure no one smokes at a child s day care or school.  If you smoke, ask your health care provider for ways to help you quit.  Ask family members to quit too.  Ask your health care provider for a referral to Quit Plan to help you quit smoking, or call 5-793-764-PLAN.     Colds, Flu, Bronchitis  These are common triggers of asthma. Wash your hands often.  Don t touch your eyes, nose or mouth.  Get a flu shot every year.     Dust Mites  These are tiny bugs that live in cloth or carpet. They are too small to see. Wash sheets and blankets in hot water every week.   Encase pillows and mattress in dust mite proof covers.  Avoid having carpet if you can. If you have carpet, vacuum weekly.   Use a dust mask and HEPA vacuum.   Pollen and Outdoor Mold  Some people are allergic to trees, grass, or weed pollen, or molds. Try to keep your windows closed.  Limit time out doors when pollen count is high.   Ask you health care provider about taking medicine during allergy season.     Animal Dander  Some people are allergic to skin flakes, urine or saliva from pets with fur or feathers. Keep pets with fur or feathers out of your home.    If you can t keep the pet outdoors, then keep the pet out of your bedroom.  Keep the bedroom door closed.  Keep pets off cloth furniture and away from  stuffed toys.     Mice, Rats, and Cockroaches  Some people are allergic to the waste from these pests.   Cover food and garbage.  Clean up spills and food crumbs.  Store grease in the refrigerator.   Keep food out of the bedroom.   Indoor Mold  This can be a trigger if your home has high moisture. Fix leaking faucets, pipes, or other sources of water.   Clean moldy surfaces.  Dehumidify basement if it is damp and smelly.   Smoke, Strong Odors, and Sprays  These can reduce air quality. Stay away from strong odors and sprays, such as perfume, powder, hair spray, paints, smoke incense, paint, cleaning products, candles and new carpet.   Exercise or Sports  Some people with asthma have this trigger. Be active!  Ask your doctor about taking medicine before sports or exercise to prevent symptoms.    Warm up for 5-10 minutes before and after sports or exercise.     Other Triggers of Asthma  Cold air:  Cover your nose and mouth with a scarf.  Sometimes laughing or crying can be a trigger.  Some medicines and food can trigger asthma.

## 2018-04-21 ASSESSMENT — ASTHMA QUESTIONNAIRES: ACT_TOTALSCORE: 10

## 2018-04-23 ENCOUNTER — TELEPHONE (OUTPATIENT)
Dept: PEDIATRICS | Facility: CLINIC | Age: 73
End: 2018-04-23

## 2018-04-23 NOTE — TELEPHONE ENCOUNTER
[facility] calling that the testing they did on the 20th was incomplete and that the patient will not be able to be discharged today because they can't supply the O2 until it is completed. The patient will need to call and come back in for testing. They will need a room air at rest, Room air while ambulating and ambulating with O2 applied to show improvement and also on 4 liters with activity to show improvement. [phone]

## 2018-04-23 NOTE — TELEPHONE ENCOUNTER
Note updated, please send to [facility]. Of note, they were to send forms on Friday for home O2, they did not. Patient does not need to come in.

## 2018-04-23 NOTE — TELEPHONE ENCOUNTER
Reason for Call:  Other call back    Detailed comments: ryan is calling from [facility] and needs a call back from a nurse. She was seen last Friday by Juan Ramon and they are still waiting for the orders to be sent to [facility]. Hospice would like to switch the O2 out today and they need [facility] to get the orders.    Phone Number Patient can be reached at: [phone] the nurses number    Best Time: any    Can we leave a detailed message on this number? YES    Call taken on 4/23/2018 at 9:33 AM by Herlinda Martin

## 2018-04-23 NOTE — TELEPHONE ENCOUNTER
Routing to provider as FYI and  orders and to nurse pool to call patient to come back in for the following testing to be completed.   Debby Obando RN

## 2018-04-23 NOTE — TELEPHONE ENCOUNTER
Testing must be completed as follows with this specific wording.  1.  Room air at rest.  2. Room air while ambulating  3. Ambulating with oxygen applied at 2 L  4. Ambulating with Oxygen applied at 4L    This note must be addended into patient chart note of 04/20/18.  Patient may return for nurse visit.  Provider must add a signature date to order and resubmit order to [facility].  Patient notified and appointment scheduled.  ERROL Villafuerte RN

## 2018-04-23 NOTE — TELEPHONE ENCOUNTER
Called Rosita( hospice) back. Pt will be d/c from hospice today, would like us to refax the DME order for oxygen & OV notes from 04/20 to [phone] ASAP.    Hand faxed the DME order & OV notes from 04/20 to the requested fax #.    Matti RN  Triage Nurse

## 2018-04-24 ENCOUNTER — TRANSFERRED RECORDS (OUTPATIENT)
Dept: HEALTH INFORMATION MANAGEMENT | Facility: CLINIC | Age: 73
End: 2018-04-24

## 2018-04-24 ENCOUNTER — ALLIED HEALTH/NURSE VISIT (OUTPATIENT)
Dept: NURSING | Facility: CLINIC | Age: 73
End: 2018-04-24
Payer: COMMERCIAL

## 2018-04-24 DIAGNOSIS — J84.9 ILD (INTERSTITIAL LUNG DISEASE) (H): Primary | ICD-10-CM

## 2018-04-24 PROCEDURE — 99207 ZZC NO CHARGE NURSE ONLY: CPT

## 2018-04-24 NOTE — PROGRESS NOTES
Testing must be completed as follows with this specific wording.  1.  Room air at rest.  2. Room air while ambulating  3. Ambulating with oxygen applied at 2 L  4. Ambulating with Oxygen applied at 4L     This note must be addended into patient chart note of 04/20/18.  Patient may return for nurse visit.  Provider must add a signature date to order and resubmit order to [facility].  Patient notified and appointment scheduled.  ERROL Villafuerte RN       1. Room air at rest.    91%    2. Room air while ambulating.    82%    3. Ambulating with oxygen applied at 2 Liters    84%   Patient very winded and short of breath upon returning to the exam room.     4. Ambulating with oxygen applied at 4 Liters.   85%  Patient very wined and short of breath begin to be slight dizzy.  Starting O2 98%,  returning to room O2 dropped to 85%  While on 4 liters of oxygen.     //Huong Soto MA// April 24, 2018

## 2018-04-24 NOTE — MR AVS SNAPSHOT
After Visit Summary   4/24/2018    Wanda Kaur    MRN: [id number]           Patient Information     Date Of Birth          1945        Visit Information        Provider Department      4/24/2018 2:30 PM EA NURSE AB [facility]        Today's Diagnoses     ILD (interstitial lung disease) (H)    -  1       Follow-ups after your visit        Your next 10 appointments already scheduled     May 17, 2018  1:10 PM CDT   SHORT with Oscar Delatorre MD   [facility]    [address]  [address]  [facility] [address] [phone]            May 30, 2018  8:00 AM CDT   (Arrive by 7:45 AM)   Return Interstitial Lung with Fernando Goetz MD   [facility] ([facility])    [address] [place] [address]  [address]  [facility] [address] [phone]              Who to contact     If you have questions or need follow up information about today's clinic visit or your schedule please contact [facility] directly at [phone].  Normal or non-critical lab and imaging results will be communicated to you by Biz[employer]art, letter or phone within 4 business days after the clinic has received the results. If you do not hear from us within 7 days, please contact the clinic through [employer] or phone. If you have a critical or abnormal lab result, we will notify you by phone as soon as possible.  Submit refill requests through [employer] or call your pharmacy and they will forward the refill request to us. Please allow 3 business days for your refill to be completed.          Additional Information About Your Visit        Bizpor[employer]art Information     [employer] gives you secure access to your electronic health record. If you see a primary care provider, you can also send messages to your care team and make appointments. If you have questions, please call your primary care clinic.  If you do not have  a primary care provider, please call [phone] and they will assist you.        Care EveryWhere ID     This is your Care EveryWhere ID. This could be used by other organizations to access your [facility] medical records  [id number]         Blood Pressure from Last 3 Encounters:   05/08/18 130/60   05/03/18 124/68   05/02/18 122/58    Weight from Last 3 Encounters:   05/08/18 148 lb 1.6 oz (67.2 kg)   05/03/18 145 lb 13 oz (66.1 kg)   05/02/18 145 lb 12.8 oz (66.1 kg)              Today, you had the following     No orders found for display       Primary Care Provider Office Phone # Fax #    Oscar Delatorre -[phone] [address] [place] [address]        Equal Access to Services     [facility]: Hadii aad yong hadasho Soomaali, waaxda luqadaha, qaybta kaalmada adevíctoryada, melvin jones . So [facility] [phone].    ATENCIÓN: Si habla español, tiene a hyde disposición servicios gratuitos de asistencia lingüística. [facility] [phone].    We comply with applicable federal civil rights laws and [place] laws. We do not discriminate on the basis of race, color, national origin, age, disability, sex, sexual orientation, or gender identity.            Thank you!     Thank you for choosing [facility]  for your care. Our goal is always to provide you with excellent care. Hearing back from our patients is one way we can continue to improve our services. Please take a few minutes to complete the written survey that you may receive in the mail after your visit with us. Thank you!             Your Updated Medication List - Protect others around you: Learn how to safely use, store and throw away your medicines at www.disposemymeds.org.          This list is accurate as of 4/24/18 11:59 PM.  Always use your most recent med list.                   Brand Name Dispense Instructions for use Diagnosis    * albuterol (2.5 MG/3ML) 0.083% neb solution       Take 1 vial by nebulization every 6 hours as needed for shortness of breath / dyspnea or wheezing        * albuterol 108 (90 Base) MCG/ACT Inhaler    PROAIR HFA/PROVENTIL HFA/VENTOLIN HFA    1 Inhaler    Inhale 2 puffs into the lungs every 6 hours as needed for shortness of breath / dyspnea or wheezing    ILD (interstitial lung disease) (H)       ibuprofen 200 MG tablet    ADVIL/MOTRIN     Take 400 mg by mouth every 4 hours as needed for mild pain        nebulizer/tubing/mouthpiece Kit     1 kit    Dispense all necessary supplies for nebulizer machine    Mild intermittent asthma       order for DME     1 Device    Please provide patient with liquid oxygen with home fill system.  Patient requires 6 liters oxygen per minute via nasal canula with activity.  Please provide patient with oximyzer. This is for lifetime use.    ILD (interstitial lung disease) (H)       order for DME     1 Month    Equipment being ordered:   Oxygen 3-6 L based on oxygen saturations Indication- fibrotic lung disease- hypoxemia O2 without oxygen 79%, with 4L oxygen at rest 4L, needs up to 6L with respiratory illness Needed 99 months    ILD (interstitial lung disease) (H), Methotrexate lung (H)       sennosides 8.6 MG tablet    SENOKOT     Take 1 tablet by mouth daily as needed for constipation        Vitamin D (Cholecalciferol) 400 units Chew      Take 2 chew tab by mouth daily        * Notice:  This list has 2 medication(s) that are the same as other medications prescribed for you. Read the directions carefully, and ask your doctor or other care provider to review them with you.

## 2018-04-24 NOTE — Clinical Note
Testing must be completed as follows with this specific wording. 1.  Room air at rest. 2. Room air while ambulating 3. Ambulating with oxygen applied at 2 L 4. Ambulating with Oxygen applied at 4L   This note must be addended into patient chart note of 04/20/18. Patient may return for nurse visit. Provider must add a signature date to order and resubmit order to [facility]. Patient notified and appointment scheduled. ERROL Villafuerte RN    1. Room air at rest.   91%  2. Room air while ambulating.   82%  3. Ambulating with oxygen applied at 2 Liters   84%   Patient very winded and short of breath upon returning to the exam room.   4. Ambulating with oxygen applied at 4 Liters.  85%  Patient very wined and short of breath begin to be slight dizzy.  Starting O2 98%,  returning to room O2 dropped to 85%  While on 4 liters of oxygen.   //Huong Soto MA// April 24, 2018

## 2018-04-24 NOTE — TELEPHONE ENCOUNTER
Forward to PCP     Nurse only appt  4/24:   1. Room air at rest.    91%    2. Room air while ambulating.    82%    3. Ambulating with oxygen applied at 2 Liters    84%   Patient very winded and short of breath upon returning to the exam room.     4. Ambulating with oxygen applied at 4 Liters.   85%  Patient very wined and short of breath begin to be slight dizzy.  Starting O2 98%,  returning to room O2 dropped to 85%  While on 4 liters of oxygen.     //Huong Soto MA// April 24, 2018

## 2018-04-25 ENCOUNTER — TELEPHONE (OUTPATIENT)
Dept: PEDIATRICS | Facility: CLINIC | Age: 73
End: 2018-04-25

## 2018-04-25 DIAGNOSIS — J84.9 ILD (INTERSTITIAL LUNG DISEASE) (H): Primary | ICD-10-CM

## 2018-04-25 NOTE — TELEPHONE ENCOUNTER
Bettina, with [facility] stated Patient needs OV, to be reevaluated.    In order, to re-qualify, from Hospice to regular Oxygen.    Please return call, at you earliest convenience.

## 2018-04-25 NOTE — TELEPHONE ENCOUNTER
Please see if [facility] has the papers that we need, was in for nurse visit as well.    Oscar Delatorre MD

## 2018-04-25 NOTE — TELEPHONE ENCOUNTER
Printed off the addended office visit notes from 4/20. Hand faxed the DME order and OV notes to [phone].  Called [facility] left message with Aurelia at [phone] to check on our fax and call us back with any other info needed.    Marlene CARDENAS Triage RN

## 2018-04-25 NOTE — TELEPHONE ENCOUNTER
"Call to [facility], spoke with Linda she states that we need an order specifying \"liquid oxygen.\" Looks like patient has an older DME for this has well, routing to Dr. Delatorre to place a new DME order.     We will then fax to [facility] with the OV notes and everything else, and Linda said that's all we should need.     Has well they will be then sending us a billing fax that just needs provider signature and date then we should be finished.     Routing to PCP for this order.    Kiara Glynn MA    "

## 2018-04-25 NOTE — TELEPHONE ENCOUNTER
Patient calling back to see why this isn't finished. Spoke with Dr. Delatorre and he signed the new order and it was faxed by [facility]. Notified patient. [phone]

## 2018-04-25 NOTE — TELEPHONE ENCOUNTER
Patient calls back.  She was seen for MD appointment and then for a nurse-only for O2 measurements.      The office visit was addended and all the information is included in the nurse only appointment.    Per the message below, the addended office visit was faxed to [facility].    Call to [facility] at #[phone]-I was not able to talk to either Bettina, or Aurelia-they will call back regarding this order.      Please call patient when addressed.    Amy Rajput RN  Message handled by Nurse Triage.

## 2018-04-25 NOTE — TELEPHONE ENCOUNTER
Called [facility] and asked for order forms for future to make this easier- unclear why this has taken so long on their end.

## 2018-04-26 NOTE — TELEPHONE ENCOUNTER
Call received from [facility], I was told that they received the DME order but not the OV notes. She confirms the fax number that we have been faxing is the correct #([phone]). She states that they will review the order & will get back to us if they need anything else from us.     Notified [facility] that I need to talk to someone who can tell me why this has been taking this long to get done. She informs me that the person who takes care of these orders is Aydee & she is at lunch break. She will make sure Aydee will call us this afternoon(Ph [phone]).    Will await for Aydee's call back, if not will try later.    Matti, RN  Triage Nurse

## 2018-04-26 NOTE — TELEPHONE ENCOUNTER
Haven't received call from Aydee. So, called her at [phone]. She states that the order got processed & the tech will be going to her home in couple of hours to set up the equipment.     Matti, RN  Triage Nurse

## 2018-05-02 ENCOUNTER — RADIANT APPOINTMENT (OUTPATIENT)
Dept: GENERAL RADIOLOGY | Facility: CLINIC | Age: 73
End: 2018-05-02
Attending: INTERNAL MEDICINE
Payer: COMMERCIAL

## 2018-05-02 ENCOUNTER — OFFICE VISIT (OUTPATIENT)
Dept: PEDIATRICS | Facility: CLINIC | Age: 73
End: 2018-05-02
Payer: COMMERCIAL

## 2018-05-02 VITALS
OXYGEN SATURATION: 98 % | BODY MASS INDEX: 29.39 KG/M2 | HEART RATE: 107 BPM | WEIGHT: 145.8 LBS | HEIGHT: 59 IN | SYSTOLIC BLOOD PRESSURE: 122 MMHG | RESPIRATION RATE: 64 BRPM | TEMPERATURE: 98 F | DIASTOLIC BLOOD PRESSURE: 58 MMHG

## 2018-05-02 DIAGNOSIS — J96.21 ACUTE ON CHRONIC RESPIRATORY FAILURE WITH HYPOXIA (H): Primary | ICD-10-CM

## 2018-05-02 DIAGNOSIS — J96.21 ACUTE ON CHRONIC RESPIRATORY FAILURE WITH HYPOXIA (H): ICD-10-CM

## 2018-05-02 DIAGNOSIS — J84.9 ILD (INTERSTITIAL LUNG DISEASE) (H): ICD-10-CM

## 2018-05-02 PROCEDURE — 99214 OFFICE O/P EST MOD 30 MIN: CPT | Mod: GV | Performed by: INTERNAL MEDICINE

## 2018-05-02 PROCEDURE — 71046 X-RAY EXAM CHEST 2 VIEWS: CPT

## 2018-05-02 RX ORDER — AZITHROMYCIN 250 MG/1
TABLET, FILM COATED ORAL
Qty: 6 TABLET | Refills: 0 | Status: SHIPPED | OUTPATIENT
Start: 2018-05-02 | End: 2018-05-08

## 2018-05-02 RX ORDER — PREDNISONE 20 MG/1
40 TABLET ORAL DAILY
Qty: 10 TABLET | Refills: 0 | Status: SHIPPED | OUTPATIENT
Start: 2018-05-02 | End: 2018-05-07

## 2018-05-02 NOTE — PATIENT INSTRUCTIONS
Go back on Flovent.     Start on steroids (5 days) and zithromax.     You need to be seen tomorrow in clinic for follow up. If your condition worsens you need to go to the emergency department immediatly.

## 2018-05-02 NOTE — NURSING NOTE
"Chief Complaint   Patient presents with     URI       Initial /58 (BP Location: Right arm, Patient Position: Sitting, Cuff Size: Adult Regular)  Pulse 107  Temp 98  F (36.7  C) (Oral)  Ht 4' 11\" (1.499 m)  Wt 145 lb 12.8 oz (66.1 kg)  SpO2 (!) 86%  BMI 29.45 kg/m2 Estimated body mass index is 29.45 kg/(m^2) as calculated from the following:    Height as of this encounter: 4' 11\" (1.499 m).    Weight as of this encounter: 145 lb 12.8 oz (66.1 kg).  Medication Reconciliation: complete     Nikki Sanchez      "

## 2018-05-02 NOTE — MR AVS SNAPSHOT
After Visit Summary   5/2/2018    Wanda Kaur    MRN: [id number]           Patient Information     Date Of Birth          1945        Visit Information        Provider Department      5/2/2018 9:40 AM Nadya Perkins MD [facility]        Today's Diagnoses     Acute on chronic respiratory failure with hypoxia (H)    -  1    ILD (interstitial lung disease) (H)          Care Instructions    Go back on Flovent.     Start on steroids (5 days) and antibiotics (**).     You need to be seen tomorrow in clinic for follow up. If your condition worsens you need to go to the emergency department immediatly.           Follow-ups after your visit        Follow-up notes from your care team     Return in 1 day (on 5/3/2018) for Follow up.      Your next 10 appointments already scheduled     May 03, 2018 10:20 AM CDT   Office Visit with Nadya Perkins MD   [facility] ([facility])    [address] [place] [address]  [address]  [facility] [id number] [phone]           Bring a current list of meds and any records pertaining to this visit. For Physicals, please bring immunization records and any forms needing to be filled out. Please arrive 10 minutes early to complete paperwork.              Who to contact     If you have questions or need follow up information about today's clinic visit or your schedule please contact [facility] directly at [phone].  Normal or non-critical lab and imaging results will be communicated to you by MyChart, letter or phone within 4 business days after the clinic has received the results. If you do not hear from us within 7 days, please contact the clinic through MyChart or phone. If you have a critical or abnormal lab result, we will notify you by phone as soon as possible.  Submit refill requests through [employer] or call your pharmacy and they will forward the refill request to us. Please allow 3  "business days for your refill to be completed.          Additional Information About Your Visit        [employer] Information     [employer] gives you secure access to your electronic health record. If you see a primary care provider, you can also send messages to your care team and make appointments. If you have questions, please call your primary care clinic.  If you do not have a primary care provider, please call [phone] and they will assist you.        Care EveryWhere ID     This is your Care EveryWhere ID. This could be used by other organizations to access your [facility] medical records  [id number]        Your Vitals Were     Pulse Temperature Respirations Height Pulse Oximetry BMI (Body Mass Index)    107 98  F (36.7  C) (Oral) 64 4' 11\" (1.499 m) 98% 29.45 kg/m2       Blood Pressure from Last 3 Encounters:   05/02/18 122/58   04/20/18 120/64   04/10/18 123/61    Weight from Last 3 Encounters:   05/02/18 145 lb 12.8 oz (66.1 kg)   04/20/18 146 lb (66.2 kg)   04/09/18 144 lb (65.3 kg)                 Today's Medication Changes          These changes are accurate as of 5/2/18 10:33 AM.  If you have any questions, ask your nurse or doctor.               Start taking these medicines.        Dose/Directions    azithromycin 250 MG tablet   Commonly known as:  ZITHROMAX   Used for:  Acute on chronic respiratory failure with hypoxia (H), ILD (interstitial lung disease) (H)   Started by:  Nadya Perkins MD        Two tablets first day, then one tablet daily for four days.   Quantity:  6 tablet   Refills:  0       predniSONE 20 MG tablet   Commonly known as:  DELTASONE   Used for:  Acute on chronic respiratory failure with hypoxia (H), ILD (interstitial lung disease) (H)   Started by:  Nadya Perkins MD        Dose:  40 mg   Take 2 tablets (40 mg) by mouth daily for 5 days   Quantity:  10 tablet   Refills:  0            Where to get your medicines      These medications were sent to [id number] - [place], [place] - [address] AT [facility] [address]  [address], [facility] [address]     Phone:  [phone]     azithromycin 250 MG tablet    predniSONE 20 MG tablet                Primary Care Provider Office Phone # Fax #    Oscar Delatorre [phone]       [address] [place] [address]        Equal Access to Services     [facility] : Hadii aad ku hadasho Soomaali, waaxda luqadaha, qaybta kaalmada adeegyada, waxay idiin hayaan adeeg kharaargelia la'finesse . So [facility] [phone].    ATENCIÓN: Si tabbyla español, tiene a hyde disposición servicios gratuitos de asistencia lingüística. [facility] [phone].    We comply with applicable federal civil rights laws and [place] laws. We do not discriminate on the basis of race, color, national origin, age, disability, sex, sexual orientation, or gender identity.            Thank you!     Thank you for choosing [facility]  for your care. Our goal is always to provide you with excellent care. Hearing back from our patients is one way we can continue to improve our services. Please take a few minutes to complete the written survey that you may receive in the mail after your visit with us. Thank you!             Your Updated Medication List - Protect others around you: Learn how to safely use, store and throw away your medicines at www.disposemymeds.org.          This list is accurate as of 5/2/18 10:33 AM.  Always use your most recent med list.                   Brand Name Dispense Instructions for use Diagnosis    * albuterol (2.5 MG/3ML) 0.083% neb solution      Take 1 vial by nebulization every 6 hours as needed for shortness of breath / dyspnea or wheezing        * albuterol 108 (90 Base) MCG/ACT Inhaler    PROAIR HFA/PROVENTIL HFA/VENTOLIN HFA    1 Inhaler    Inhale 2 puffs into the lungs every 6 hours as needed for shortness of breath / dyspnea or wheezing    ILD (interstitial lung  disease) (H)       azithromycin 250 MG tablet    ZITHROMAX    6 tablet    Two tablets first day, then one tablet daily for four days.    Acute on chronic respiratory failure with hypoxia (H), ILD (interstitial lung disease) (H)       ibuprofen 200 MG tablet    ADVIL/MOTRIN     Take 400 mg by mouth every 4 hours as needed for mild pain        nebulizer/tubing/mouthpiece Kit     1 kit    Dispense all necessary supplies for nebulizer machine    Mild intermittent asthma       order for DME     1 Device    Please provide patient with liquid oxygen with home fill system.  Patient requires 6 liters oxygen per minute via nasal canula with activity.  Please provide patient with oximyzer. This is for lifetime use.    ILD (interstitial lung disease) (H)       order for DME     1 Month    Equipment being ordered:   Oxygen 3-6 L based on oxygen saturations Indication- fibrotic lung disease- hypoxemia O2 without oxygen 79%, with 4L oxygen at rest 4L, needs up to 6L with respiratory illness Needed 99 months    ILD (interstitial lung disease) (H), Methotrexate lung (H)       order for DME     1 Device    Equipment being ordered: Liquid oxygen, 4 L flow rate, needs for home, dispense supplies for 99 months, needed for interstitial lung disease- please send form if this does not work    ILD (interstitial lung disease) (H)       predniSONE 20 MG tablet    DELTASONE    10 tablet    Take 2 tablets (40 mg) by mouth daily for 5 days    Acute on chronic respiratory failure with hypoxia (H), ILD (interstitial lung disease) (H)       sennosides 8.6 MG tablet    SENOKOT     Take 1 tablet by mouth daily as needed for constipation        Vitamin D (Cholecalciferol) 400 units Chew      Take 2 chew tab by mouth daily        * Notice:  This list has 2 medication(s) that are the same as other medications prescribed for you. Read the directions carefully, and ask your doctor or other care provider to review them with you.

## 2018-05-02 NOTE — PROGRESS NOTES
"  SUBJECTIVE:   Wanda Kaur is a 72 year old female who presents to clinic today for the following health issues:    Wanda is a patient with a PMHx of interstitial lung disease causing  chronic respiratory failure with hypoxia on 4 L 02 at baseline.  Pt  presents to the clinic for the complaint of sore throat, productive cough with clear mucous, wheezing and fatigue/malaise. Sx started 4-5 days ago with sore throat which progressed into severe cough with fatigue and \"nothing like she has ever had before\". States she is more short of breath than normal and it is harder to talk. O2 stats at home sitting are in 90's; reports if she has been up for a while doing activities she is in the high 80's. Reports she is currently on 4 L O2 when she is normally on 3 L. States her normal RR is in the 20's and increases into 40-50 when sick. Reports she can consciously slow her RR. RR in clinic was 64.  She thinks this is an exacerbation of lung disease; patient is willing to go on \"short burst\" of steroids. Patient is very hesitant to go on steroids since she was on 90 MG of prednisone in the past and did not like side effects; weaned off about 1.5 years ago. States has Flovent and used it last night but has not been using it regularly.     She recently had a posterior cervical foraminotomy on 4/9/2018 which she tolerated well.      Pt does not wish to be hospitalized today.  She would like to try to manage this at home with po medications.  Currently her code status is listed as full code after her recent surgery.           RESPIRATORY SYMPTOMS      Duration: 4-5 days     Description  sore throat, productive cough with clear mucous, wheezing and fatigue/malaise    Severity: moderate    Accompanying signs and symptoms: None    History (predisposing factors):  none    Precipitating or alleviating factors: None    Therapies tried and outcome:  rest and fluids, guaifenesin, NyQuil     Pt reports that she has underlying " "\"really bad\" lung disease.       Problem list and histories reviewed & adjusted, as indicated.  Additional history: as documented    Patient Active Problem List   Diagnosis     Other symptoms involving cardiovascular system     Enthesopathy of hip region     Osteoarthrosis, hand     CARDIOVASCULAR SCREENING; LDL GOAL LESS THAN 160     Advance Care Planning     Mediastinal mass     Enthesopathy of hip region, unspecified laterality     Neck pain     Rheumatoid arthritis, involving unspecified site, unspecified rheumatoid factor presence (H)     Immunosuppression (H)     Pneumonia of both upper lobes     Hypoxia     Health Care Home     Pneumonia of both lungs due to methicillin resistant Staphylococcus aureus (MRSA), unspecified part of lung (H)     Methotrexate lung (H)     Acute respiratory failure with hypoxia (H)     ILD (interstitial lung disease) (H)     Sick-euthyroid syndrome     Anemia, unspecified type     Abnormal CT of the chest     Status post coronary angiogram     Past Surgical History:   Procedure Laterality Date     ABDOMEN SURGERY  20yrs plus    lap     ARTHROPLASTY SHOULDER  10/2010    right     ARTHROPLASTY SHOULDER  12/28/2011    Procedure:ARTHROPLASTY SHOULDER; Left Total Shoulder Arthroplasty  ; Surgeon:HERBERT LIAO; Location:[facility] OR     BIOPSY  2015    Vats proceedure for thymoma     BREAST SURGERY Left     breast cysts     C NONSPECIFIC PROCEDURE      L breast cysts      CARPAL TUNNEL RELEASE RT/LT      R carpal release     COLONOSCOPY       FORAMINOTOMY CERVICAL POSTERIOR TWO LEVELS N/A 4/9/2018    Procedure: FORAMINOTOMY CERVICAL POSTERIOR TWO LEVELS;  POSTERIOR CERVICAL FORAMINOTOMY C7-T1;  Surgeon: Jesus Camejo MD;  Location: [facility] OR     FUSION CERVICAL ANTERIOR ONE LEVEL      L c6-7 cervical disc with fusion     HC ESOPH/GAS REFLUX TEST W NASAL IMPED >1 HR N/A 9/22/2016    Procedure: ESOPHAGEAL IMPEDENCE FUNCTION TEST WITH 24 HOUR PH GREATER THAN 1 HOUR;  Surgeon: Kevin Craft, " Bret TINSLEY MD;  Location:  GI      LAPAROSCOPY, SURGICAL; CHOLECYSTECTOMY      lap choly      HEAD & NECK SURGERY       HYSTERECTOMY TOTAL ABDOMINAL, BILATERAL SALPINGO-OOPHORECTOMY, COMBINED       THORACOSCOPIC WEDGE RESECTION LUNG Right 3/24/2015    Procedure: THORACOSCOPIC WEDGE RESECTION LUNG;  Surgeon: Júnior Oakes MD;  Location:  OR       Social History   Substance Use Topics     Smoking status: Never Smoker     Smokeless tobacco: Never Used      Comment: smoked very briefly as a teen     Alcohol use 0.6 oz/week     1 Standard drinks or equivalent per week      Comment: 1 glass of wine daily     Family History   Problem Relation Age of Onset     Respiratory Mother      COPD  at 64     HEART DISEASE Father       of CVA at 81     CEREBROVASCULAR DISEASE Maternal Grandmother      CEREBROVASCULAR DISEASE Paternal Grandmother          Current Outpatient Prescriptions   Medication Sig Dispense Refill     albuterol (PROAIR HFA, PROVENTIL HFA, VENTOLIN HFA) 108 (90 BASE) MCG/ACT inhaler Inhale 2 puffs into the lungs every 6 hours as needed for shortness of breath / dyspnea or wheezing 1 Inhaler 11     ibuprofen (ADVIL,MOTRIN) 200 MG tablet Take 400 mg by mouth every 4 hours as needed for mild pain       order for DME Please provide patient with liquid oxygen with home fill system.  Patient requires 6 liters oxygen per minute via nasal canula with activity.  Please provide patient with oximyzer. This is for lifetime use. 1 Device 0     order for DME Equipment being ordered:     Oxygen 3-6 L based on oxygen saturations  Indication- fibrotic lung disease- hypoxemia  O2 without oxygen 79%, with 4L oxygen at rest 4L, needs up to 6L with respiratory illness  Needed 99 months 1 Month 11     order for DME Equipment being ordered: Liquid oxygen, 4 L flow rate, needs for home, dispense supplies for 99 months, needed for interstitial lung disease- please send form if this does not work 1 Device 11  "    Respiratory Therapy Supplies (NEBULIZER/TUBING/MOUTHPIECE) KIT Dispense all necessary supplies for nebulizer machine 1 kit 99     sennosides (SENOKOT) 8.6 MG tablet Take 1 tablet by mouth daily as needed for constipation       Vitamin D, Cholecalciferol, 400 UNITS CHEW Take 2 chew tab by mouth daily       albuterol (2.5 MG/3ML) 0.083% neb solution Take 1 vial by nebulization every 6 hours as needed for shortness of breath / dyspnea or wheezing       Allergies   Allergen Reactions     No Known Drug Allergies        Reviewed and updated as needed this visit by clinical staff  Tobacco  Allergies  Meds  Med Hx  Surg Hx  Fam Hx  Soc Hx      Reviewed and updated as needed this visit by Provider         ROS:  Constitutional, HEENT, cardiovascular, pulmonary, gi and gu systems are negative, except as otherwise noted.    This document serves as a record of the services and decisions personally performed and made by Nadya Perkins MD. It was created on her behalf by Litzy Pop, a trained medical scribe. The creation of this document is based the provider's statements to the medical scribe.    Litzy Pop May 2, 2018 9:29 AM  OBJECTIVE:     /58 (BP Location: Right arm, Patient Position: Sitting, Cuff Size: Adult Regular)  Pulse 107  Temp 98  F (36.7  C) (Oral)  Ht 4' 11\" (1.499 m)  Wt 145 lb 12.8 oz (66.1 kg)  SpO2 (!) 86%  BMI 29.45 kg/m2  Body mass index is 29.45 kg/(m^2).  GENERAL: alert and no distress chronically ill appearing on NCO2.  Pt is severely tachypnic with 5-6 word dyspnea.    HENT: ear canals and TM's normal, nose and mouth without ulcers or lesions  RESP: fine crackles bilateral bases, no wheezing or ronchi.    CV: tachycardic, normal S1 S2, no S3 or S4, no murmur, click or rub, no peripheral edema   MS: no gross musculoskeletal defects noted, no edema  PSYCH: mentation appears normal, affect normal/bright    Diagnostic Test Results:  CXR - changes consistent with ild, no " infiltrates noted.  unchanged from previous.      ASSESSMENT/PLAN:     (J96.21) Acute on chronic respiratory failure with hypoxia (H)  (primary encounter diagnosis)  -- CXR: negative for infiltrates as read by me  --considering patients respiratory rate and underlying lung disease, this patient is at very high risk for respiratory failure and death.   -- would prefer to send patient to hospital for inpatient treatment, but patient is resistant.  Pt is aware of the risks of not being hospitalized including the risk of death.    -- per patient wishes will try outpatient treatment with po steroids and zithromax.  Restart flovent.  Very close follow up in clinic scheduled for tomorrow   -- patient instructed to go immediately to emergency department if sx are worsening before tomorrow   Plan: XR Chest 2 Views    Noted that patients code status was changed to full code for surgery.  I discussed this with patient who would like to return to her previous DNR/DNI status, which is in agreement with her POLST on file.  Pt is clear that she does not wish to be intubated, and would prefer to not be hospitalized.  Pt is not currently on hospice, and has had a relatively stable outpatient course up to this point.                  The information in this document, created by the medical scribe for me, accurately reflects the services I personally performed and the decisions made by me. I have reviewed and approved this document for accuracy prior to leaving the patient care area.  Nadya Perkins MD  [facility]

## 2018-05-03 ENCOUNTER — TELEPHONE (OUTPATIENT)
Dept: PEDIATRICS | Facility: CLINIC | Age: 73
End: 2018-05-03

## 2018-05-03 ENCOUNTER — OFFICE VISIT (OUTPATIENT)
Dept: PEDIATRICS | Facility: CLINIC | Age: 73
End: 2018-05-03
Payer: COMMERCIAL

## 2018-05-03 VITALS
OXYGEN SATURATION: 96 % | RESPIRATION RATE: 48 BRPM | TEMPERATURE: 98.2 F | HEIGHT: 59 IN | HEART RATE: 83 BPM | SYSTOLIC BLOOD PRESSURE: 124 MMHG | WEIGHT: 145.81 LBS | BODY MASS INDEX: 29.4 KG/M2 | DIASTOLIC BLOOD PRESSURE: 68 MMHG

## 2018-05-03 DIAGNOSIS — J96.21 ACUTE ON CHRONIC RESPIRATORY FAILURE WITH HYPOXIA (H): Primary | ICD-10-CM

## 2018-05-03 PROCEDURE — 99214 OFFICE O/P EST MOD 30 MIN: CPT | Mod: GW | Performed by: INTERNAL MEDICINE

## 2018-05-03 NOTE — PROGRESS NOTES
SUBJECTIVE:   Wanda Kaur is a 72 year old female who presents to clinic today for the following health issues:    Patient reports she is feeling good today since starting Prednisone. Notes she got a good night's sleep last night and was able to nap yesterday. Notes breathing is still fast today with minor cough; has bumped oxygen up to 4 L. States cough is productive and phlegm is clear without blood. RR in clinic was 48.     Problem list and histories reviewed & adjusted, as indicated.  Additional history: as documented    Patient Active Problem List   Diagnosis     Other symptoms involving cardiovascular system     Enthesopathy of hip region     Osteoarthrosis, hand     CARDIOVASCULAR SCREENING; LDL GOAL LESS THAN 160     Advance Care Planning     Mediastinal mass     Enthesopathy of hip region, unspecified laterality     Neck pain     Rheumatoid arthritis, involving unspecified site, unspecified rheumatoid factor presence (H)     Immunosuppression (H)     Pneumonia of both upper lobes     Hypoxia     Health Care Home     Pneumonia of both lungs due to methicillin resistant Staphylococcus aureus (MRSA), unspecified part of lung (H)     Methotrexate lung (H)     Acute respiratory failure with hypoxia (H)     ILD (interstitial lung disease) (H)     Sick-euthyroid syndrome     Anemia, unspecified type     Abnormal CT of the chest     Status post coronary angiogram     Past Surgical History:   Procedure Laterality Date     ABDOMEN SURGERY  20yrs plus    lap     ARTHROPLASTY SHOULDER  10/2010    right     ARTHROPLASTY SHOULDER  12/28/2011    Procedure:ARTHROPLASTY SHOULDER; Left Total Shoulder Arthroplasty  ; Surgeon:HERBERT LIAO; Location:[facility]     BIOPSY  2015    Vats proceedure for thymoma     BREAST SURGERY Left     breast cysts     C NONSPECIFIC PROCEDURE      L breast cysts      CARPAL TUNNEL RELEASE RT/LT      R carpal release     COLONOSCOPY       FORAMINOTOMY CERVICAL POSTERIOR TWO LEVELS N/A  2018    Procedure: FORAMINOTOMY CERVICAL POSTERIOR TWO LEVELS;  POSTERIOR CERVICAL FORAMINOTOMY C7-T1;  Surgeon: Jesus Camejo MD;  Location: [facility] OR     FUSION CERVICAL ANTERIOR ONE LEVEL      L c6-7 cervical disc with fusion     HC ESOPH/GAS REFLUX TEST W NASAL IMPED >1 HR N/A 2016    Procedure: ESOPHAGEAL IMPEDENCE FUNCTION TEST WITH 24 HOUR PH GREATER THAN 1 HOUR;  Surgeon: Bret Magdaleno MD;  Location: [facility]     HC LAPAROSCOPY, SURGICAL; CHOLECYSTECTOMY      lap choly      HEAD & NECK SURGERY       HYSTERECTOMY TOTAL ABDOMINAL, BILATERAL SALPINGO-OOPHORECTOMY, COMBINED       THORACOSCOPIC WEDGE RESECTION LUNG Right 3/24/2015    Procedure: THORACOSCOPIC WEDGE RESECTION LUNG;  Surgeon: Júnior Oakes MD;  Location:  OR       Social History   Substance Use Topics     Smoking status: Never Smoker     Smokeless tobacco: Never Used      Comment: smoked very briefly as a teen     Alcohol use 0.6 oz/week     1 Standard drinks or equivalent per week      Comment: 1 glass of wine daily     Family History   Problem Relation Age of Onset     Respiratory Mother      COPD  at 64     HEART DISEASE Father       of CVA at 81     CEREBROVASCULAR DISEASE Maternal Grandmother      CEREBROVASCULAR DISEASE Paternal Grandmother          Current Outpatient Prescriptions   Medication Sig Dispense Refill     albuterol (2.5 MG/3ML) 0.083% neb solution Take 1 vial by nebulization every 6 hours as needed for shortness of breath / dyspnea or wheezing       albuterol (PROAIR HFA, PROVENTIL HFA, VENTOLIN HFA) 108 (90 BASE) MCG/ACT inhaler Inhale 2 puffs into the lungs every 6 hours as needed for shortness of breath / dyspnea or wheezing 1 Inhaler 11     azithromycin (ZITHROMAX) 250 MG tablet Two tablets first day, then one tablet daily for four days. 6 tablet 0     ibuprofen (ADVIL,MOTRIN) 200 MG tablet Take 400 mg by mouth every 4 hours as needed for mild pain       order for DME Please  provide patient with liquid oxygen with home fill system.  Patient requires 6 liters oxygen per minute via nasal canula with activity.  Please provide patient with oximyzer. This is for lifetime use. 1 Device 0     order for DME Equipment being ordered:     Oxygen 3-6 L based on oxygen saturations  Indication- fibrotic lung disease- hypoxemia  O2 without oxygen 79%, with 4L oxygen at rest 4L, needs up to 6L with respiratory illness  Needed 99 months 1 Month 11     order for DME Equipment being ordered: Liquid oxygen, 4 L flow rate, needs for home, dispense supplies for 99 months, needed for interstitial lung disease- please send form if this does not work 1 Device 11     predniSONE (DELTASONE) 20 MG tablet Take 2 tablets (40 mg) by mouth daily for 5 days 10 tablet 0     Respiratory Therapy Supplies (NEBULIZER/TUBING/MOUTHPIECE) KIT Dispense all necessary supplies for nebulizer machine 1 kit 99     sennosides (SENOKOT) 8.6 MG tablet Take 1 tablet by mouth daily as needed for constipation       Vitamin D, Cholecalciferol, 400 UNITS CHEW Take 2 chew tab by mouth daily       Allergies   Allergen Reactions     No Known Drug Allergies        Reviewed and updated as needed this visit by clinical staff  Tobacco  Allergies  Meds  Med Hx  Surg Hx  Fam Hx  Soc Hx      Reviewed and updated as needed this visit by Provider         ROS:  Constitutional, HEENT, cardiovascular, pulmonary, GI, , musculoskeletal, neuro, skin, endocrine and psych systems are negative, except as otherwise noted.    This document serves as a record of the services and decisions personally performed and made by Nadya Perkins MD. It was created on her behalf by Litzy Pop, a trained medical scribe. The creation of this document is based the provider's statements to the medical scribe.    Litzy Pop May 3, 2018 10:46 AM  OBJECTIVE:     /68 (BP Location: Right arm, Patient Position: Sitting, Cuff Size: Adult Regular)  Pulse 83   "Temp 98.2  F (36.8  C) (Oral)  Ht 1.499 m (4' 11\")  Wt 66.1 kg (145 lb 13 oz)  SpO2 96%  BMI 29.45 kg/m2  Body mass index is 29.45 kg/(m^2).  GENERAL: chronically ill appearing, severe tachypena with 5-6 word dyspnea   RESP: lungs clear to auscultation - no rales, rhonchi or wheezes  CV: regular rate and rhythm, normal S1 S2, no S3 or S4, no murmur, click or rub  PSYCH: mentation appears normal, affect normal/bright    Diagnostic Test Results:  none     ASSESSMENT/PLAN:     (J96.21) Acute on chronic respiratory failure with hypoxia (H)  (primary encounter diagnosis)  -- RR has improved to 48; still concerned about respiratory distress.    -- currently on 40mg of prednisone daily x 5 days and tolerating this well with some improvement in symptoms; also taking zithromax  -- patient advised to go to emergency department if worsening over weekend   -- close follow up scheduled for next week     Follow up on May 8th or as needed.     The information in this document, created by the medical scribe for me, accurately reflects the services I personally performed and the decisions made by me. I have reviewed and approved this document for accuracy prior to leaving the patient care area.  Nadya Perkins MD  [facility]  "

## 2018-05-03 NOTE — PATIENT INSTRUCTIONS
We will call you tomorrow and see how you are doing.     If you are getting worse then you need to go to the emergency department immediatly.

## 2018-05-03 NOTE — NURSING NOTE
"Chief Complaint   Patient presents with     RECHECK       Initial /68 (BP Location: Right arm, Patient Position: Sitting, Cuff Size: Adult Regular)  Pulse 83  Temp 98.2  F (36.8  C) (Oral)  Ht 4' 11\" (1.499 m)  Wt 145 lb 13 oz (66.1 kg)  SpO2 96%  BMI 29.45 kg/m2 Estimated body mass index is 29.45 kg/(m^2) as calculated from the following:    Height as of this encounter: 4' 11\" (1.499 m).    Weight as of this encounter: 145 lb 13 oz (66.1 kg).  Medication Reconciliation: complete     Nikki Sanchez      "

## 2018-05-03 NOTE — MR AVS SNAPSHOT
After Visit Summary   5/3/2018    Wanda Kaur    MRN: [id number]           Patient Information     Date Of Birth          1945        Visit Information        Provider Department      5/3/2018 10:20 AM Nadya Perkins MD [facility] [place]        Care Instructions    We will call you tomorrow and see how you are doing.     If you are getting worse then you need to go to the emergency department immediatly.           Follow-ups after your visit        Follow-up notes from your care team     Return in 5 days (on 5/8/2018) for Follow up.      Your next 10 appointments already scheduled     May 08, 2018  1:20 PM CDT   Office Visit with Nadya Perkins MD   [facility] ([facility] [place])    [address] [address]  [address]  [facility] [address] [phone]           Bring a current list of meds and any records pertaining to this visit. For Physicals, please bring immunization records and any forms needing to be filled out. Please arrive 10 minutes early to complete paperwork.              Who to contact     If you have questions or need follow up information about today's clinic visit or your schedule please contact [facility] directly at [phone].  Normal or non-critical lab and imaging results will be communicated to you by MyChart, letter or phone within 4 business days after the clinic has received the results. If you do not hear from us within 7 days, please contact the clinic through [facility] or phone. If you have a critical or abnormal lab result, we will notify you by phone as soon as possible.  Submit refill requests through [employer] or call your pharmacy and they will forward the refill request to us. Please allow 3 business days for your refill to be completed.          Additional Information About Your Visit        [facility] Information     [employer] gives you secure access to your electronic health record. If you see a  "primary care provider, you can also send messages to your care team and make appointments. If you have questions, please call your primary care clinic.  If you do not have a primary care provider, please call [phone] and they will assist you.        Care EveryWhere ID     This is your Care EveryWhere ID. This could be used by other organizations to access your [facility] medical records  [id number]        Your Vitals Were     Pulse Temperature Respirations Height Pulse Oximetry BMI (Body Mass Index)    83 98.2  F (36.8  C) (Oral) 48 4' 11\" (1.499 m) 96% 29.45 kg/m2       Blood Pressure from Last 3 Encounters:   05/03/18 124/68   05/02/18 122/58   04/20/18 120/64    Weight from Last 3 Encounters:   05/03/18 145 lb 13 oz (66.1 kg)   05/02/18 145 lb 12.8 oz (66.1 kg)   04/20/18 146 lb (66.2 kg)              Today, you had the following     No orders found for display       Primary Care Provider Office Phone # Fax #    Oscar Delatorre [phone] [address] [place] [address]        Equal Access to Services     [facility]: Hadii aad ku hadasho Soomaali, waaxda luqadaha, qaybta kaalmada adeegyada, melvin mendezn eddy degroot lasuresh ah. So [facility] [phone].    ATENCIÓN: Si habla español, tiene a hyde disposición servicios gratuitos de asistencia lingüística. Llame al [phone].    We comply with applicable federal civil rights laws and [place] laws. We do not discriminate on the basis of race, color, national origin, age, disability, sex, sexual orientation, or gender identity.            Thank you!     Thank you for choosing [facility]  for your care. Our goal is always to provide you with excellent care. Hearing back from our patients is one way we can continue to improve our services. Please take a few minutes to complete the written survey that you may receive in the mail after your visit with us. Thank you!             Your Updated Medication List - " Protect others around you: Learn how to safely use, store and throw away your medicines at www.disposemymeds.org.          This list is accurate as of 5/3/18 10:52 AM.  Always use your most recent med list.                   Brand Name Dispense Instructions for use Diagnosis    * albuterol (2.5 MG/3ML) 0.083% neb solution      Take 1 vial by nebulization every 6 hours as needed for shortness of breath / dyspnea or wheezing        * albuterol 108 (90 Base) MCG/ACT Inhaler    PROAIR HFA/PROVENTIL HFA/VENTOLIN HFA    1 Inhaler    Inhale 2 puffs into the lungs every 6 hours as needed for shortness of breath / dyspnea or wheezing    ILD (interstitial lung disease) (H)       azithromycin 250 MG tablet    ZITHROMAX    6 tablet    Two tablets first day, then one tablet daily for four days.    Acute on chronic respiratory failure with hypoxia (H), ILD (interstitial lung disease) (H)       ibuprofen 200 MG tablet    ADVIL/MOTRIN     Take 400 mg by mouth every 4 hours as needed for mild pain        nebulizer/tubing/mouthpiece Kit     1 kit    Dispense all necessary supplies for nebulizer machine    Mild intermittent asthma       order for DME     1 Device    Please provide patient with liquid oxygen with home fill system.  Patient requires 6 liters oxygen per minute via nasal canula with activity.  Please provide patient with oximyzer. This is for lifetime use.    ILD (interstitial lung disease) (H)       order for DME     1 Month    Equipment being ordered:   Oxygen 3-6 L based on oxygen saturations Indication- fibrotic lung disease- hypoxemia O2 without oxygen 79%, with 4L oxygen at rest 4L, needs up to 6L with respiratory illness Needed 99 months    ILD (interstitial lung disease) (H), Methotrexate lung (H)       order for DME     1 Device    Equipment being ordered: Liquid oxygen, 4 L flow rate, needs for home, dispense supplies for 99 months, needed for interstitial lung disease- please send form if this does not work     ILD (interstitial lung disease) (H)       predniSONE 20 MG tablet    DELTASONE    10 tablet    Take 2 tablets (40 mg) by mouth daily for 5 days    Acute on chronic respiratory failure with hypoxia (H), ILD (interstitial lung disease) (H)       sennosides 8.6 MG tablet    SENOKOT     Take 1 tablet by mouth daily as needed for constipation        Vitamin D (Cholecalciferol) 400 units Chew      Take 2 chew tab by mouth daily        * Notice:  This list has 2 medication(s) that are the same as other medications prescribed for you. Read the directions carefully, and ask your doctor or other care provider to review them with you.

## 2018-05-04 NOTE — TELEPHONE ENCOUNTER
Received message from TC that patient had called back and didn't want to hold. Told the TC she was doing fine today.   Debby Obando RN

## 2018-05-07 ENCOUNTER — MEDICAL CORRESPONDENCE (OUTPATIENT)
Dept: HEALTH INFORMATION MANAGEMENT | Facility: CLINIC | Age: 73
End: 2018-05-07

## 2018-05-07 NOTE — TELEPHONE ENCOUNTER
Patient calling back now that she has developed sinus symptoms. She pants when she coughs but otherwise her respirations are better. Has appointment scheduled for tomorrow.  Advised to call back if respirations worsen again.  Debby Obando RN

## 2018-05-08 ENCOUNTER — OFFICE VISIT (OUTPATIENT)
Dept: PEDIATRICS | Facility: CLINIC | Age: 73
End: 2018-05-08
Payer: COMMERCIAL

## 2018-05-08 VITALS
WEIGHT: 148.1 LBS | RESPIRATION RATE: 22 BRPM | SYSTOLIC BLOOD PRESSURE: 130 MMHG | BODY MASS INDEX: 29.91 KG/M2 | TEMPERATURE: 97.7 F | OXYGEN SATURATION: 99 % | DIASTOLIC BLOOD PRESSURE: 60 MMHG | HEART RATE: 82 BPM

## 2018-05-08 DIAGNOSIS — J84.9 ILD (INTERSTITIAL LUNG DISEASE) (H): ICD-10-CM

## 2018-05-08 DIAGNOSIS — J96.01 ACUTE RESPIRATORY FAILURE WITH HYPOXIA (H): Primary | ICD-10-CM

## 2018-05-08 PROCEDURE — 99213 OFFICE O/P EST LOW 20 MIN: CPT | Mod: GW | Performed by: INTERNAL MEDICINE

## 2018-05-08 RX ORDER — PREDNISONE 20 MG/1
40 TABLET ORAL DAILY
Qty: 10 TABLET | Refills: 0 | COMMUNITY
Start: 2018-05-08 | End: 2019-03-28

## 2018-05-08 NOTE — PATIENT INSTRUCTIONS
Flonase and sinus rinses.     If you have blood or brown stuff in phlegm then let me know.     Make an appointment with pulmonology to discuss options for different oxygen tanks.

## 2018-05-08 NOTE — PROGRESS NOTES
"  SUBJECTIVE:   Wanda Kaur is a 72 year old female who presents to clinic today for the following health issues:    Wanda presents to the clinic for follow up on respiratory distress. Patient reports she is \"breathing better\", but cough still persists. Notes she has about 3 coughing fits a day; reports phlegm is clear, but has developed nasal congestion. States she is \"pretty close\" to her normal baseline breathing. She is using 4 L oxygen and would like to leave it there. She is concerned that being under generalized anaesthesia 3 weeks prior onset symptoms triggered her symptoms.     Patient is thinking about getting a portable, pulsed oxygen machine she would need an order for. Reports she runs about 3 hours of oxygen, but would like a lighter tank.  Pulmonologist is Dr. Cruz through the [facility].         Follow up Respiratory Distress       Duration: 2 weeks    Description (location/character/radiation): Still coughing, nasal congestion    Intensity:  moderate    Accompanying signs and symptoms: None    History (similar episodes/previous evaluation): Yes    Precipitating or alleviating factors: None    Therapies tried and outcome: Prednisone and Zithromax with some relief        Problem list and histories reviewed & adjusted, as indicated.  Additional history: as documented    Patient Active Problem List   Diagnosis     Other symptoms involving cardiovascular system     Enthesopathy of hip region     Osteoarthrosis, hand     CARDIOVASCULAR SCREENING; LDL GOAL LESS THAN 160     Advance Care Planning     Mediastinal mass     Enthesopathy of hip region, unspecified laterality     Neck pain     Rheumatoid arthritis, involving unspecified site, unspecified rheumatoid factor presence (H)     Immunosuppression (H)     Pneumonia of both upper lobes     Hypoxia     Health Care Home     Pneumonia of both lungs due to methicillin resistant Staphylococcus aureus (MRSA), unspecified part of lung (H)     " Methotrexate lung (H)     Acute respiratory failure with hypoxia (H)     ILD (interstitial lung disease) (H)     Sick-euthyroid syndrome     Anemia, unspecified type     Abnormal CT of the chest     Status post coronary angiogram     Past Surgical History:   Procedure Laterality Date     ABDOMEN SURGERY  20yrs plus    lap     ARTHROPLASTY SHOULDER  10/2010    right     ARTHROPLASTY SHOULDER  2011    Procedure:ARTHROPLASTY SHOULDER; Left Total Shoulder Arthroplasty  ; Surgeon:HERBERT LIAO; Location:[facility] OR     BIOPSY      Vats proceedure for thymoma     BREAST SURGERY Left     breast cysts     C NONSPECIFIC PROCEDURE      L breast cysts      CARPAL TUNNEL RELEASE RT/LT      R carpal release     COLONOSCOPY       FORAMINOTOMY CERVICAL POSTERIOR TWO LEVELS N/A 2018    Procedure: FORAMINOTOMY CERVICAL POSTERIOR TWO LEVELS;  POSTERIOR CERVICAL FORAMINOTOMY C7-T1;  Surgeon: Jesus Camejo MD;  Location: [facility] OR     FUSION CERVICAL ANTERIOR ONE LEVEL      L c6-7 cervical disc with fusion     HC ESOPH/GAS REFLUX TEST W NASAL IMPED >1 HR N/A 2016    Procedure: ESOPHAGEAL IMPEDENCE FUNCTION TEST WITH 24 HOUR PH GREATER THAN 1 HOUR;  Surgeon: Bret Magdaleno MD;  Location: [facility]     HC LAPAROSCOPY, SURGICAL; CHOLECYSTECTOMY      lap choly      HEAD & NECK SURGERY       HYSTERECTOMY TOTAL ABDOMINAL, BILATERAL SALPINGO-OOPHORECTOMY, COMBINED       THORACOSCOPIC WEDGE RESECTION LUNG Right 3/24/2015    Procedure: THORACOSCOPIC WEDGE RESECTION LUNG;  Surgeon: Júnior Oakes MD;  Location:  OR       Social History   Substance Use Topics     Smoking status: Never Smoker     Smokeless tobacco: Never Used      Comment: smoked very briefly as a teen     Alcohol use 0.6 oz/week     1 Standard drinks or equivalent per week      Comment: 1 glass of wine daily     Family History   Problem Relation Age of Onset     Respiratory Mother      COPD  at 64     HEART DISEASE Father       of  CVA at 81     CEREBROVASCULAR DISEASE Maternal Grandmother      CEREBROVASCULAR DISEASE Paternal Grandmother          Current Outpatient Prescriptions   Medication Sig Dispense Refill     albuterol (2.5 MG/3ML) 0.083% neb solution Take 1 vial by nebulization every 6 hours as needed for shortness of breath / dyspnea or wheezing       albuterol (PROAIR HFA, PROVENTIL HFA, VENTOLIN HFA) 108 (90 BASE) MCG/ACT inhaler Inhale 2 puffs into the lungs every 6 hours as needed for shortness of breath / dyspnea or wheezing 1 Inhaler 11     ibuprofen (ADVIL,MOTRIN) 200 MG tablet Take 400 mg by mouth every 4 hours as needed for mild pain       order for DME Please provide patient with liquid oxygen with home fill system.  Patient requires 6 liters oxygen per minute via nasal canula with activity.  Please provide patient with oximyzer. This is for lifetime use. 1 Device 0     order for DME Equipment being ordered:     Oxygen 3-6 L based on oxygen saturations  Indication- fibrotic lung disease- hypoxemia  O2 without oxygen 79%, with 4L oxygen at rest 4L, needs up to 6L with respiratory illness  Needed 99 months 1 Month 11     order for DME Equipment being ordered: Liquid oxygen, 4 L flow rate, needs for home, dispense supplies for 99 months, needed for interstitial lung disease- please send form if this does not work 1 Device 11     predniSONE (DELTASONE) 20 MG tablet Take 2 tablets (40 mg) by mouth daily 10 tablet 0     Respiratory Therapy Supplies (NEBULIZER/TUBING/MOUTHPIECE) KIT Dispense all necessary supplies for nebulizer machine 1 kit 99     sennosides (SENOKOT) 8.6 MG tablet Take 1 tablet by mouth daily as needed for constipation       Vitamin D, Cholecalciferol, 400 UNITS CHEW Take 2 chew tab by mouth daily       Allergies   Allergen Reactions     No Known Drug Allergies      BP Readings from Last 3 Encounters:   05/08/18 130/60   05/03/18 124/68   05/02/18 122/58    Wt Readings from Last 3 Encounters:   05/08/18 67.2 kg  (148 lb 1.6 oz)   05/03/18 66.1 kg (145 lb 13 oz)   05/02/18 66.1 kg (145 lb 12.8 oz)                  Labs reviewed in EPIC    Reviewed and updated as needed this visit by clinical staff  Tobacco  Allergies  Meds  Med Hx  Surg Hx  Fam Hx  Soc Hx      Reviewed and updated as needed this visit by Provider         ROS:  Constitutional, HEENT, cardiovascular, pulmonary, GI, , musculoskeletal, neuro, skin, endocrine and psych systems are negative, except as otherwise noted.    This document serves as a record of the services and decisions personally performed and made by Nadya Perkins MD. It was created on her behalf by Litzy Pop, a trained medical scribe. The creation of this document is based the provider's statements to the medical scribe.    Litzy Pop May 8, 2018 8:04 AM  OBJECTIVE:     /60  Pulse 82  Temp 97.7  F (36.5  C) (Oral)  Resp 22  Wt 67.2 kg (148 lb 1.6 oz)  SpO2 99%  BMI 29.91 kg/m2  Body mass index is 29.91 kg/(m^2).   Gen:  Alert, mild tachypnea.  Able to converse in full sentences.    RESP:Fine crackles bases to mid lungs   CV: regular rate and rhythm, normal S1 S2, no S3 or S4, no murmur, click or rub  PSYCH: mentation appears normal, affect normal/bright        Diagnostic Test Results:  none     ASSESSMENT/PLAN:   (J96.01) Acute respiratory failure with hypoxia (H)  (primary encounter diagnosis)  -- RR has significantly improved with steroids; patient back to baseline   -- discontinue steroids; call if respiratory status worsens off steroids   -- follow up in 6 months with PCP       (J84.9) ILD (interstitial lung disease) (H)  -- discussed with patient unable to recommend new O2 tank due to respiratory illness   -- follow up with pulmonology to discuss different oxygen tank options     Follow up in 6 months with PCP or as needed.       The information in this document, created by the medical scribe for me, accurately reflects the services I personally performed and  the decisions made by me. I have reviewed and approved this document for accuracy prior to leaving the patient care area.  Nadya Perkins MD  [facility]

## 2018-05-08 NOTE — MR AVS SNAPSHOT
After Visit Summary   5/8/2018    Wanda Kaur    MRN: [id number]           Patient Information     Date Of Birth          1945        Visit Information        Provider Department      5/8/2018 1:20 PM Nadya Perkins MD [facility]        Today's Diagnoses     Acute respiratory failure with hypoxia (H)    -  1    ILD (interstitial lung disease) (H)          Care Instructions    Flonase and sinus rinses.     If you have blood or brown stuff in phlegm then let me know.     Make an appointment with pulmonology to discuss options for different oxygen tanks.               Follow-ups after your visit        Follow-up notes from your care team     Return in about 6 months (around 11/8/2018) for Follow up.      Who to contact     If you have questions or need follow up information about today's clinic visit or your schedule please contact [facility] directly at [phone].  Normal or non-critical lab and imaging results will be communicated to you by [employer], letter or phone within 4 business days after the clinic has received the results. If you do not hear from us within 7 days, please contact the clinic through [employer] or phone. If you have a critical or abnormal lab result, we will notify you by phone as soon as possible.  Submit refill requests through [employer] or call your pharmacy and they will forward the refill request to us. Please allow 3 business days for your refill to be completed.          Additional Information About Your Visit        MyChart Information     [employer] gives you secure access to your electronic health record. If you see a primary care provider, you can also send messages to your care team and make appointments. If you have questions, please call your primary care clinic.  If you do not have a primary care provider, please call [phone] and they will assist you.        Care EveryWhere ID     This is your Care EveryWhere ID. This  could be used by other organizations to access your [place] medical records  [id number]        Your Vitals Were     Pulse Temperature Respirations Pulse Oximetry BMI (Body Mass Index)       82 97.7  F (36.5  C) (Oral) 22 99% 29.91 kg/m2        Blood Pressure from Last 3 Encounters:   05/08/18 130/60   05/03/18 124/68   05/02/18 122/58    Weight from Last 3 Encounters:   05/08/18 148 lb 1.6 oz (67.2 kg)   05/03/18 145 lb 13 oz (66.1 kg)   05/02/18 145 lb 12.8 oz (66.1 kg)              Today, you had the following     No orders found for display       Primary Care Provider Office Phone # Fax #    Oscar Delatorre [phone] [address] [place] [address]        Equal Access to Services     [facility]: Hadii yony barnes Sorajeev, waaxda luqadaha, qaybta kaalmada luis enrique, melvin jones . So [facility] [phone].    ATENCIÓN: Si habla español, tiene a hyde disposición servicios gratuitos de asistencia lingüística. Llame al [phone].    We comply with applicable federal civil rights laws and [place] laws. We do not discriminate on the basis of race, color, national origin, age, disability, sex, sexual orientation, or gender identity.            Thank you!     Thank you for choosing [facility]  for your care. Our goal is always to provide you with excellent care. Hearing back from our patients is one way we can continue to improve our services. Please take a few minutes to complete the written survey that you may receive in the mail after your visit with us. Thank you!             Your Updated Medication List - Protect others around you: Learn how to safely use, store and throw away your medicines at www.disposemymeds.org.          This list is accurate as of 5/8/18  2:01 PM.  Always use your most recent med list.                   Brand Name Dispense Instructions for use Diagnosis    * albuterol (2.5 MG/3ML) 0.083% neb solution       Take 1 vial by nebulization every 6 hours as needed for shortness of breath / dyspnea or wheezing        * albuterol 108 (90 Base) MCG/ACT Inhaler    PROAIR HFA/PROVENTIL HFA/VENTOLIN HFA    1 Inhaler    Inhale 2 puffs into the lungs every 6 hours as needed for shortness of breath / dyspnea or wheezing    ILD (interstitial lung disease) (H)       ibuprofen 200 MG tablet    ADVIL/MOTRIN     Take 400 mg by mouth every 4 hours as needed for mild pain        nebulizer/tubing/mouthpiece Kit     1 kit    Dispense all necessary supplies for nebulizer machine    Mild intermittent asthma       order for DME     1 Device    Please provide patient with liquid oxygen with home fill system.  Patient requires 6 liters oxygen per minute via nasal canula with activity.  Please provide patient with oximyzer. This is for lifetime use.    ILD (interstitial lung disease) (H)       order for DME     1 Month    Equipment being ordered:   Oxygen 3-6 L based on oxygen saturations Indication- fibrotic lung disease- hypoxemia O2 without oxygen 79%, with 4L oxygen at rest 4L, needs up to 6L with respiratory illness Needed 99 months    ILD (interstitial lung disease) (H), Methotrexate lung (H)       order for DME     1 Device    Equipment being ordered: Liquid oxygen, 4 L flow rate, needs for home, dispense supplies for 99 months, needed for interstitial lung disease- please send form if this does not work    ILD (interstitial lung disease) (H)       predniSONE 20 MG tablet    DELTASONE    10 tablet    Take 2 tablets (40 mg) by mouth daily        sennosides 8.6 MG tablet    SENOKOT     Take 1 tablet by mouth daily as needed for constipation        Vitamin D (Cholecalciferol) 400 units Chew      Take 2 chew tab by mouth daily        * Notice:  This list has 2 medication(s) that are the same as other medications prescribed for you. Read the directions carefully, and ask your doctor or other care provider to review them with you.

## 2018-05-30 ENCOUNTER — RADIANT APPOINTMENT (OUTPATIENT)
Dept: CT IMAGING | Facility: CLINIC | Age: 73
End: 2018-05-30
Attending: INTERNAL MEDICINE
Payer: COMMERCIAL

## 2018-05-30 ENCOUNTER — OFFICE VISIT (OUTPATIENT)
Dept: PULMONOLOGY | Facility: CLINIC | Age: 73
End: 2018-05-30
Attending: INTERNAL MEDICINE
Payer: MEDICARE

## 2018-05-30 VITALS
OXYGEN SATURATION: 100 % | BODY MASS INDEX: 29.84 KG/M2 | WEIGHT: 148 LBS | HEIGHT: 59 IN | SYSTOLIC BLOOD PRESSURE: 105 MMHG | RESPIRATION RATE: 22 BRPM | HEART RATE: 85 BPM | DIASTOLIC BLOOD PRESSURE: 58 MMHG

## 2018-05-30 DIAGNOSIS — J84.9 ILD (INTERSTITIAL LUNG DISEASE) (H): ICD-10-CM

## 2018-05-30 DIAGNOSIS — J84.9 ILD (INTERSTITIAL LUNG DISEASE) (H): Primary | ICD-10-CM

## 2018-05-30 LAB
6 MIN WALK (FT): 835 FT
6 MIN WALK (M): 255 M
ALBUMIN SERPL-MCNC: 3.6 G/DL (ref 3.4–5)
ALP SERPL-CCNC: 82 U/L (ref 40–150)
ALT SERPL W P-5'-P-CCNC: 23 U/L (ref 0–50)
ANION GAP SERPL CALCULATED.3IONS-SCNC: 7 MMOL/L (ref 3–14)
AST SERPL W P-5'-P-CCNC: 19 U/L (ref 0–45)
BILIRUB DIRECT SERPL-MCNC: <0.1 MG/DL (ref 0–0.2)
BILIRUB SERPL-MCNC: 0.2 MG/DL (ref 0.2–1.3)
BUN SERPL-MCNC: 11 MG/DL (ref 7–30)
CALCIUM SERPL-MCNC: 9 MG/DL (ref 8.5–10.1)
CHLORIDE SERPL-SCNC: 106 MMOL/L (ref 94–109)
CK SERPL-CCNC: 92 U/L (ref 30–225)
CO2 SERPL-SCNC: 28 MMOL/L (ref 20–32)
CREAT SERPL-MCNC: 0.91 MG/DL (ref 0.52–1.04)
CRP SERPL-MCNC: <2.9 MG/L (ref 0–8)
ERYTHROCYTE [DISTWIDTH] IN BLOOD BY AUTOMATED COUNT: 12.6 % (ref 10–15)
ERYTHROCYTE [SEDIMENTATION RATE] IN BLOOD BY WESTERGREN METHOD: 67 MM/H (ref 0–30)
GFR SERPL CREATININE-BSD FRML MDRD: 61 ML/MIN/1.7M2
GLUCOSE SERPL-MCNC: 94 MG/DL (ref 70–99)
HCT VFR BLD AUTO: 36 % (ref 35–47)
HGB BLD-MCNC: 11.8 G/DL (ref 11.7–15.7)
MCH RBC QN AUTO: 31.5 PG (ref 26.5–33)
MCHC RBC AUTO-ENTMCNC: 32.8 G/DL (ref 31.5–36.5)
MCV RBC AUTO: 96 FL (ref 78–100)
PLATELET # BLD AUTO: 241 10E9/L (ref 150–450)
POTASSIUM SERPL-SCNC: 4 MMOL/L (ref 3.4–5.3)
PROT SERPL-MCNC: 7 G/DL (ref 6.8–8.8)
RBC # BLD AUTO: 3.75 10E12/L (ref 3.8–5.2)
SODIUM SERPL-SCNC: 141 MMOL/L (ref 133–144)
WBC # BLD AUTO: 3.6 10E9/L (ref 4–11)

## 2018-05-30 PROCEDURE — 80048 BASIC METABOLIC PNL TOTAL CA: CPT | Performed by: INTERNAL MEDICINE

## 2018-05-30 PROCEDURE — 82550 ASSAY OF CK (CPK): CPT | Performed by: INTERNAL MEDICINE

## 2018-05-30 PROCEDURE — 82784 ASSAY IGA/IGD/IGG/IGM EACH: CPT | Performed by: INTERNAL MEDICINE

## 2018-05-30 PROCEDURE — G0463 HOSPITAL OUTPT CLINIC VISIT: HCPCS | Mod: ZF

## 2018-05-30 PROCEDURE — 36415 COLL VENOUS BLD VENIPUNCTURE: CPT | Performed by: INTERNAL MEDICINE

## 2018-05-30 PROCEDURE — 86431 RHEUMATOID FACTOR QUANT: CPT | Performed by: INTERNAL MEDICINE

## 2018-05-30 PROCEDURE — 86140 C-REACTIVE PROTEIN: CPT | Performed by: INTERNAL MEDICINE

## 2018-05-30 PROCEDURE — 86039 ANTINUCLEAR ANTIBODIES (ANA): CPT | Performed by: INTERNAL MEDICINE

## 2018-05-30 PROCEDURE — 86331 IMMUNODIFFUSION OUCHTERLONY: CPT | Performed by: INTERNAL MEDICINE

## 2018-05-30 PROCEDURE — 86235 NUCLEAR ANTIGEN ANTIBODY: CPT | Performed by: INTERNAL MEDICINE

## 2018-05-30 PROCEDURE — 86255 FLUORESCENT ANTIBODY SCREEN: CPT | Performed by: INTERNAL MEDICINE

## 2018-05-30 PROCEDURE — 86038 ANTINUCLEAR ANTIBODIES: CPT | Performed by: INTERNAL MEDICINE

## 2018-05-30 PROCEDURE — 80076 HEPATIC FUNCTION PANEL: CPT | Performed by: INTERNAL MEDICINE

## 2018-05-30 PROCEDURE — 82787 IGG 1 2 3 OR 4 EACH: CPT | Performed by: INTERNAL MEDICINE

## 2018-05-30 PROCEDURE — 85027 COMPLETE CBC AUTOMATED: CPT | Performed by: INTERNAL MEDICINE

## 2018-05-30 PROCEDURE — 82085 ASSAY OF ALDOLASE: CPT | Performed by: INTERNAL MEDICINE

## 2018-05-30 PROCEDURE — 85652 RBC SED RATE AUTOMATED: CPT | Performed by: INTERNAL MEDICINE

## 2018-05-30 PROCEDURE — 86200 CCP ANTIBODY: CPT | Performed by: INTERNAL MEDICINE

## 2018-05-30 PROCEDURE — 86606 ASPERGILLUS ANTIBODY: CPT | Performed by: INTERNAL MEDICINE

## 2018-05-30 RX ORDER — PREDNISONE 10 MG/1
TABLET ORAL
Qty: 40 TABLET | Refills: 1 | Status: SHIPPED | OUTPATIENT
Start: 2018-05-30 | End: 2019-03-28

## 2018-05-30 ASSESSMENT — PAIN SCALES - GENERAL: PAINLEVEL: NO PAIN (0)

## 2018-05-30 NOTE — NURSING NOTE
Chief Complaint   Patient presents with     Interstitial Lung Disease (ILD)     Patient is here for a ILD follow up     SMA Akhil

## 2018-05-30 NOTE — MR AVS SNAPSHOT
After Visit Summary   5/30/2018    Wanda Kaur    MRN: [id number]           Patient Information     Date Of Birth          1945        Visit Information        Provider Department      5/30/2018 8:30 AM Fernando Goetz MD [facility]        Today's Diagnoses     ILD (interstitial lung disease) (H)    -  1       Follow-ups after your visit        Follow-up notes from your care team     Return in about 3 months (around 8/30/2018).      Your next 10 appointments already scheduled     Sep 05, 2018 11:00 AM CDT   PFT VISIT with  PFL D   [facility] Pulmonary Function Testing ([facility])    [address]  3rd Floor  [facility] [address] [phone]            Sep 05, 2018 11:30 AM CDT   (Arrive by 11:15 AM)   Return Interstitial Lung with Fernando Goetz MD   [facility] ([facility] [place])    [address]  [address] [place] [address] [phone]              Who to contact     If you have questions or need follow up information about today's clinic visit or your schedule please contact [facility] directly at [phone].  Normal or non-critical lab and imaging results will be communicated to you by MyChart, letter or phone within 4 business days after the clinic has received the results. If you do not hear from us within 7 days, please contact the clinic through [employer] or phone. If you have a critical or abnormal lab result, we will notify you by phone as soon as possible.  Submit refill requests through [employer] or call your pharmacy and they will forward the refill request to us. Please allow 3 business days for your refill to be completed.          Additional Information About Your Visit        [employer] Information     [employer] gives you secure access to your electronic health record. If you see a primary care  "provider, you can also send messages to your care team and make appointments. If you have questions, please call your primary care clinic.  If you do not have a primary care provider, please call [phone] and they will assist you.        Care EveryWhere ID     This is your Care EveryWhere ID. This could be used by other organizations to access your [place] medical records  [id number]        Your Vitals Were     Pulse Respirations Height Pulse Oximetry BMI (Body Mass Index)       85 22 1.499 m (4' 11\") 100% 29.89 kg/m2        Blood Pressure from Last 3 Encounters:   No data found for BP    Weight from Last 3 Encounters:   No data found for Wt              Today, you had the following     No orders found for display         Today's Medication Changes          These changes are accurate as of 5/30/18 11:59 PM.  If you have any questions, ask your nurse or doctor.               These medicines have changed or have updated prescriptions.        Dose/Directions    * predniSONE 20 MG tablet   Commonly known as:  DELTASONE   This may have changed:  Another medication with the same name was added. Make sure you understand how and when to take each.   Changed by:  Fernando Goetz MD        Dose:  40 mg   Take 2 tablets (40 mg) by mouth daily   Quantity:  10 tablet   Refills:  0       * predniSONE 10 MG tablet   Commonly known as:  DELTASONE   This may have changed:  You were already taking a medication with the same name, and this prescription was added. Make sure you understand how and when to take each.   Used for:  ILD (interstitial lung disease) (H)   Changed by:  Fernando Goetz MD        Take 4 tablets by mouth for 3 days, then 3 tablets for 3 days, then 2 tablets for 3 days, then 1 tablet for 3 days then off   Quantity:  40 tablet   Refills:  1       * Notice:  This list has 2 medication(s) that are the same as other medications prescribed for you. Read the directions carefully, and ask your doctor or " other care provider to review them with you.         Where to get your medicines      These medications were sent to [facility] - [place], [place] - [address] [facility] [address]  [address], [facility] [address]     Phone:  [phone]     predniSONE 10 MG tablet                Primary Care Provider Office Phone # Fax #    Oscar Delatorre -[phone] [address] [place] [address]        Equal Access to Services     [facility]: Hadii aad ku hadasho Soomaali, waaxda luqadaha, qaybta kaalmada adeegyada, waxay idiin hayaan adeeg mikayla jones . So [facility] [phone].    ATENCIÓN: Si habla español, tiene a hyde disposición servicios gratuitos de asistencia lingüística. [facility] [phone].    We comply with applicable federal civil rights laws and [place] laws. We do not discriminate on the basis of race, color, national origin, age, disability, sex, sexual orientation, or gender identity.            Thank you!     Thank you for choosing [facility]  for your care. Our goal is always to provide you with excellent care. Hearing back from our patients is one way we can continue to improve our services. Please take a few minutes to complete the written survey that you may receive in the mail after your visit with us. Thank you!             Your Updated Medication List - Protect others around you: Learn how to safely use, store and throw away your medicines at www.disposemymeds.org.          This list is accurate as of 5/30/18 11:59 PM.  Always use your most recent med list.                   Brand Name Dispense Instructions for use Diagnosis    * albuterol (2.5 MG/3ML) 0.083% neb solution      Take 1 vial by nebulization every 6 hours as needed for shortness of breath / dyspnea or wheezing        * albuterol 108 (90 Base) MCG/ACT Inhaler    PROAIR HFA/PROVENTIL HFA/VENTOLIN HFA    1 Inhaler    Inhale 2  puffs into the lungs every 6 hours as needed for shortness of breath / dyspnea or wheezing    ILD (interstitial lung disease) (H)       ibuprofen 200 MG tablet    ADVIL/MOTRIN     Take 400 mg by mouth every 4 hours as needed for mild pain        nebulizer/tubing/mouthpiece Kit     1 kit    Dispense all necessary supplies for nebulizer machine    Mild intermittent asthma       order for DME     1 Device    Please provide patient with liquid oxygen with home fill system.  Patient requires 6 liters oxygen per minute via nasal canula with activity.  Please provide patient with oximyzer. This is for lifetime use.    ILD (interstitial lung disease) (H)       order for DME     1 Month    Equipment being ordered:   Oxygen 3-6 L based on oxygen saturations Indication- fibrotic lung disease- hypoxemia O2 without oxygen 79%, with 4L oxygen at rest 4L, needs up to 6L with respiratory illness Needed 99 months    ILD (interstitial lung disease) (H), Methotrexate lung (H)       order for DME     1 Device    Equipment being ordered: Liquid oxygen, 4 L flow rate, needs for home, dispense supplies for 99 months, needed for interstitial lung disease- please send form if this does not work    ILD (interstitial lung disease) (H)       * predniSONE 20 MG tablet    DELTASONE    10 tablet    Take 2 tablets (40 mg) by mouth daily        * predniSONE 10 MG tablet    DELTASONE    40 tablet    Take 4 tablets by mouth for 3 days, then 3 tablets for 3 days, then 2 tablets for 3 days, then 1 tablet for 3 days then off    ILD (interstitial lung disease) (H)       sennosides 8.6 MG tablet    SENOKOT     Take 1 tablet by mouth daily as needed for constipation        Vitamin D (Cholecalciferol) 400 units Chew      Take 2 chew tab by mouth daily        * Notice:  This list has 4 medication(s) that are the same as other medications prescribed for you. Read the directions carefully, and ask your doctor or other care provider to review them with you.

## 2018-05-30 NOTE — LETTER
5/30/2018       RE: Wanda Kaur  [address] [place] [address]     Dear Colleague,    Thank you for referring your patient, Wanda Kaur, to the [facility]. Please see a copy of my visit note below.    HISTORY OF PRESENT ILLNESS:  The patient is a 72-year-old female who previously had been followed by Dr. Cruz here at the ILD Clinic at the [facility].  She was last seen by Dr. Cruz in 11/2016.  She returns today to reestablish care in the Interstitial Lung Disease Clinic.  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  She is thought to have seronegative rheumatoid arthritis and for a period of time was treated with methotrexate.  In 06/2016, she had an exacerbation of her ILD, possibly related to pneumonia, had bilateral infiltrates on CT scans and was hospitalized for a prolonged period of time.  In the aftermath of that, she was placed on CellCept and prednisone, and she said she felt awful while on CellCept and prednisone.  In 01/2017, she tapered herself off of those medications.  Since that point in time, she has been using an albuterol inhaler and albuterol nebs for her lung disease in addition to oxygen that she uses 3 liters per minute at rest and 4 liters per minute with exercise.  Because of the severity of her lung disease for a while she was on the lung transplant list.  She took herself off of the list because she decided that it was not for her.  She also was in hospice care for a while, but because things were stable, she came out of hospice care.  She states that over the last year and a half her breathing has been stable for her.  She has not noticed deterioration in what she can do.  She says she can do simple tasks, walking short distances and does fairly well with any  heavy exertion which is poorly tolerated.  Of note, about a month or 2 ago, she underwent neck surgery and thinks that since that time her cough, which is sort of chronic, has gotten worse.  She says she has had a chronic, productive cough for years, but it has gotten worse.  She also thinks that she may have caught a cold this past spring where the cough may have been worse.  She was treated with a Z-ERICK and a 5-day course of prednisone with maybe transient help, but the cough sort of got worse again and that seemed to be one of her major complaints right now is her cough.  She takes some cough syrup for that which seems to help.      PAST MEDICAL HISTORY:  Seronegative rheumatoid arthritis treated with methotrexate in the past.  Her arthritis does not appear to be active at this point in time.  She has undergone surgery of her neck in  and had repeat surgery just recently in 2018.  She has had bilateral shoulder replacements.  She has had a history of thymoma removed and a nodule of left breast cancer, hysterectomy.  She is also status post gallbladder surgery and has a history of carpal tunnel.        FAMILY HISTORY:  Mother  of COPD.  Both mother and father had osteoarthritis.  She is unaware of a family history of underlying connective tissue diseases.      SOCIAL HISTORY:  The patient worked as a nurse.  She does not relate having problems working as a nurse.  She was exposed to secondhand smoke from her parents.  In , she was exposed to lot of dust in her house related to a remodeling project.  Otherwise, there are no pets, no birds, no hot tub, no water damage or mold that she is aware of within her home.      CURRENT MEDICATIONS:  Include albuterol, vitamin D, ibuprofen on a p.r.n. basis, cough syrup p.r.n. and Ativan p.r.n.      REVIEW OF SYSTEMS:   HEENT:  There is some postnasal drip, but she denies seasonal allergies and does not think that she has had any flare of her allergies this past  spring.   CARDIAC:  In review of history, she does not relate a history of heart problems.  She does not have history of hypertension, and she denies exertional chest pain.  She denies syncope, dizziness and occasionally will notice a rapid heartbeat but has not had dizziness associated with that.   GASTROINTESTINAL:  This is a question of reflux symptoms.  She thinks that she may occasionally have some reflux but is not really noticing it that bad.   MUSCULOSKELETAL:  She does not relate significant joint swelling or joint pain at this point in time.        The rest of her comprehensive review of systems is negative.      PHYSICAL EXAMINATION:   VITAL SIGNS:  Stable.  O2 sats are 100% while on oxygen.   EYES:  Nonicteric.     ENT:  Mouth and throat without lesions.   NECK:  No thyromegaly.   LYMPH:  No adenopathy appreciated.   CHEST:  She has crackles about a third to half the way up bilaterally, clear anterior.   HEART:  Regular rate and rhythm, normal S1 and S2.   ABDOMEN:  Soft, nontender, no hepatosplenomegaly.   EXTREMITIES:  No clubbing, cyanosis or edema.  On joint examination, she has some deformed joints in her fingers, mostly within her PIP joints but somewhat in the MCP joints.  I do not see evidence of active inflammation at this point in time on her joint exam.   SKIN:  I do not see specific rash.      PULMONARY FUNCTION TESTS:  The patient had PFTs done today.  FEV1 was 1.5 and FVC was 1.22.  The FVC was about 53% of predicted, and DLCO was 31% of predicted.  Of note, her PFTs in 2016 show her FEV1 was 0.91 and FVC was 1.07, so her PFTs have been stable to actually somewhat improved compared to 2016.  There is no evidence that her interstitial lung disease has worsened over the last year and a half to 2 years.       IMAGING:  I do not have imaging on her chest CT scan today.  Her previous imaging to me is in a nondiagnostic pattern.  It could be possible IPF.  It also could be NSIP.      ASSESSMENT  AND PLAN:     1.  In summary, Ms. Kaur is a 72-year-old lady with interstitial lung disease.  In 06/2016, she had an exacerbation of ILD.  She was placed on CellCept and prednisone.  She poorly tolerated the medications and tapered herself off of it.  Since that time, her pulmonary function tests have been stable.  The etiology of her ILD is unclear; however, it does not seem to have progressed over the past year and a half and I think it is probably more likely that this is a connective tissue disease associated interstitial lung disease as opposed to IPF.  I will obtain a chest CT scan on her today to look and see whether or not there is worsening of fibrosis on CT scan.  If things are stable, then I would not recommend any specific treatment for ILD at this point in time.   2.  Cough.  The etiology of her cough is not clear.  She had a recent neck surgery.  She probably had an ET tube placed at that time and could have had some irritation of her trachea.  She also had a cold in and around that time and may have had some inflammation related that.  I will give her a brief course of prednisone burst and taper to see whether or not that helps the cough.  If that does not seem to help, we could consider a trial of a PPI for possible reflux to see whether that might help.  Also there is a possibility that this could be related to her underlying ILD, but given that her ILD is not progressive, I think it is less likely that the worsening cough would be related to her ILD if her ILD is stable.  I will plan to see the patient back in 3 months with PFTs.     Addendum: Chest CT shows stable fibrotic changes in a pattern c/w fibrotic LENA Goetz  Pulmonary/Critical Care  May 30, 2018 3:58 PM

## 2018-05-30 NOTE — PROGRESS NOTES
HISTORY OF PRESENT ILLNESS:  The patient is a 72-year-old female who previously had been followed by Dr. Cruz here at the ILD Clinic at the [facility].  She was last seen by Dr. Cruz in 11/2016.  She returns today to reestablish care in the Interstitial Lung Disease Clinic.  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  She is thought to have seronegative rheumatoid arthritis and for a period of time was treated with methotrexate.  In 06/2016, she had an exacerbation of her ILD, possibly related to pneumonia, had bilateral infiltrates on CT scans and was hospitalized for a prolonged period of time.  In the aftermath of that, she was placed on CellCept and prednisone, and she said she felt awful while on CellCept and prednisone.  In 01/2017, she tapered herself off of those medications.  Since that point in time, she has been using an albuterol inhaler and albuterol nebs for her lung disease in addition to oxygen that she uses 3 liters per minute at rest and 4 liters per minute with exercise.  Because of the severity of her lung disease for a while she was on the lung transplant list.  She took herself off of the list because she decided that it was not for her.  She also was in hospice care for a while, but because things were stable, she came out of hospice care.  She states that over the last year and a half her breathing has been stable for her.  She has not noticed deterioration in what she can do.  She says she can do simple tasks, walking short distances and does fairly well with any heavy exertion which is poorly tolerated.  Of note, about a month or 2 ago, she underwent neck surgery and thinks that since that time her cough, which is sort of chronic, has gotten worse.  She says she has had a chronic, productive cough for years, but it has gotten worse.  She also thinks that she may have caught  a cold this past spring where the cough may have been worse.  She was treated with a Z-ERICK and a 5-day course of prednisone with maybe transient help, but the cough sort of got worse again and that seemed to be one of her major complaints right now is her cough.  She takes some cough syrup for that which seems to help.      PAST MEDICAL HISTORY:  Seronegative rheumatoid arthritis treated with methotrexate in the past.  Her arthritis does not appear to be active at this point in time.  She has undergone surgery of her neck in  and had repeat surgery just recently in 2018.  She has had bilateral shoulder replacements.  She has had a history of thymoma removed and a nodule of left breast cancer, hysterectomy.  She is also status post gallbladder surgery and has a history of carpal tunnel.        FAMILY HISTORY:  Mother  of COPD.  Both mother and father had osteoarthritis.  She is unaware of a family history of underlying connective tissue diseases.      SOCIAL HISTORY:  The patient worked as a nurse.  She does not relate having problems working as a nurse.  She was exposed to secondhand smoke from her parents.  In , she was exposed to lot of dust in her house related to a remodeling project.  Otherwise, there are no pets, no birds, no hot tub, no water damage or mold that she is aware of within her home.      CURRENT MEDICATIONS:  Include albuterol, vitamin D, ibuprofen on a p.r.n. basis, cough syrup p.r.n. and Ativan p.r.n.      REVIEW OF SYSTEMS:   HEENT:  There is some postnasal drip, but she denies seasonal allergies and does not think that she has had any flare of her allergies this past spring.   CARDIAC:  In review of history, she does not relate a history of heart problems.  She does not have history of hypertension, and she denies exertional chest pain.  She denies syncope, dizziness and occasionally will notice a rapid heartbeat but has not had dizziness associated with that.   GASTROINTESTINAL:   This is a question of reflux symptoms.  She thinks that she may occasionally have some reflux but is not really noticing it that bad.   MUSCULOSKELETAL:  She does not relate significant joint swelling or joint pain at this point in time.        The rest of her comprehensive review of systems is negative.      PHYSICAL EXAMINATION:   VITAL SIGNS:  Stable.  O2 sats are 100% while on oxygen.   EYES:  Nonicteric.     ENT:  Mouth and throat without lesions.   NECK:  No thyromegaly.   LYMPH:  No adenopathy appreciated.   CHEST:  She has crackles about a third to half the way up bilaterally, clear anterior.   HEART:  Regular rate and rhythm, normal S1 and S2.   ABDOMEN:  Soft, nontender, no hepatosplenomegaly.   EXTREMITIES:  No clubbing, cyanosis or edema.  On joint examination, she has some deformed joints in her fingers, mostly within her PIP joints but somewhat in the MCP joints.  I do not see evidence of active inflammation at this point in time on her joint exam.   SKIN:  I do not see specific rash.      PULMONARY FUNCTION TESTS:  The patient had PFTs done today.  FEV1 was 1.5 and FVC was 1.22.  The FVC was about 53% of predicted, and DLCO was 31% of predicted.  Of note, her PFTs in 2016 show her FEV1 was 0.91 and FVC was 1.07, so her PFTs have been stable to actually somewhat improved compared to 2016.  There is no evidence that her interstitial lung disease has worsened over the last year and a half to 2 years.       IMAGING:  I do not have imaging on her chest CT scan today.  Her previous imaging to me is in a nondiagnostic pattern.  It could be possible IPF.  It also could be NSIP.      ASSESSMENT AND PLAN:     1.  In summary, Ms. Kaur is a 72-year-old lady with interstitial lung disease.  In 06/2016, she had an exacerbation of ILD.  She was placed on CellCept and prednisone.  She poorly tolerated the medications and tapered herself off of it.  Since that time, her pulmonary function tests have been stable.   The etiology of her ILD is unclear; however, it does not seem to have progressed over the past year and a half and I think it is probably more likely that this is a connective tissue disease associated interstitial lung disease as opposed to IPF.  I will obtain a chest CT scan on her today to look and see whether or not there is worsening of fibrosis on CT scan.  If things are stable, then I would not recommend any specific treatment for ILD at this point in time.   2.  Cough.  The etiology of her cough is not clear.  She had a recent neck surgery.  She probably had an ET tube placed at that time and could have had some irritation of her trachea.  She also had a cold in and around that time and may have had some inflammation related that.  I will give her a brief course of prednisone burst and taper to see whether or not that helps the cough.  If that does not seem to help, we could consider a trial of a PPI for possible reflux to see whether that might help.  Also there is a possibility that this could be related to her underlying ILD, but given that her ILD is not progressive, I think it is less likely that the worsening cough would be related to her ILD if her ILD is stable.  I will plan to see the patient back in 3 months with PFTs.     Addendum: Chest CT shows stable fibrotic changes in a pattern c/w fibrotic LENA Goetz  Pulmonary/Critical Care  May 30, 2018 3:58 PM

## 2018-05-31 ENCOUNTER — CARE COORDINATION (OUTPATIENT)
Dept: PULMONOLOGY | Facility: CLINIC | Age: 73
End: 2018-05-31

## 2018-05-31 LAB
ALDOLASE SERPL-CCNC: 4.5 U/L (ref 1.5–8.1)
ANA PAT SER IF-IMP: ABNORMAL
ANA SER QL IF: POSITIVE
ANA TITR SER IF: ABNORMAL {TITER}
CCP AB SER IA-ACNC: 1 U/ML
ENA JO1 IGG SER-ACNC: <0.2 AI (ref 0–0.9)
ENA SCL70 IGG SER IA-ACNC: <0.2 AI (ref 0–0.9)
ENA SS-A IGG SER IA-ACNC: <0.2 AI (ref 0–0.9)
ENA SS-B IGG SER IA-ACNC: <0.2 AI (ref 0–0.9)
RHEUMATOID FACT SER NEPH-ACNC: <20 IU/ML (ref 0–20)

## 2018-05-31 NOTE — PROGRESS NOTES
Requested by Dr. Goetz to contact patient with results of chest CT scan.  CT stable.  At this point Dr. Goetz wants to continue to monitor patient.  Patient verbalizes understanding and agrees with this plan.

## 2018-06-01 LAB
ANCA IGG TITR SER IF: NORMAL {TITER}
IGG SERPL-MCNC: 431 MG/DL (ref 695–1620)
IGG1 SER-MCNC: ABNORMAL MG/DL (ref 300–856)
IGG2 SER-MCNC: ABNORMAL MG/DL (ref 158–761)
IGG3 SER-MCNC: ABNORMAL MG/DL (ref 24–192)
IGG4 SER-MCNC: ABNORMAL MG/DL (ref 11–86)

## 2018-06-02 LAB
RESULT: ABNORMAL
SEND OUTS MISC TEST CODE: ABNORMAL
SEND OUTS MISC TEST SPECIMEN: ABNORMAL
TEST NAME: ABNORMAL

## 2018-06-04 LAB
A FLAVUS AB SER QL ID: NORMAL
A FUMIGATUS2 AB SER QL: NORMAL
A FUMIGATUS3 AB SER QL ID: NORMAL
LACEYELLA SACCHARI AB SER QL: NORMAL
S VIRIDIS AB SER QL: NORMAL
T CANDIDUS AB SER QL: NORMAL

## 2018-06-08 ENCOUNTER — CARE COORDINATION (OUTPATIENT)
Dept: PULMONOLOGY | Facility: CLINIC | Age: 73
End: 2018-06-08

## 2018-06-08 DIAGNOSIS — J84.9 ILD (INTERSTITIAL LUNG DISEASE) (H): Primary | ICD-10-CM

## 2018-06-08 DIAGNOSIS — K21.9 ESOPHAGEAL REFLUX: ICD-10-CM

## 2018-06-08 LAB
A FUMIGATUS1 AB SER QL ID: NORMAL
A FUMIGATUS6 AB SER QL ID: NORMAL
A PULLULANS AB SER QL ID: NORMAL
PIGEON DROP IGE QN: NORMAL
S RECTIVIRGULA AB SER QL ID: NORMAL
T VULGARIS AB SER QL ID: NORMAL

## 2018-06-08 NOTE — PROGRESS NOTES
Patient states that her cough has not improved at all since starting prednisone.  She is wondering if there is anything else that she can take.  Discussed with Dr. Goetz who would like patient to start Omeprazole to see if controlling reflux will help her cough.  Patient is in agreement with this plan.

## 2018-06-12 DIAGNOSIS — J84.9 ILD (INTERSTITIAL LUNG DISEASE) (H): ICD-10-CM

## 2018-06-12 RX ORDER — ALBUTEROL SULFATE 90 UG/1
2 AEROSOL, METERED RESPIRATORY (INHALATION) EVERY 6 HOURS PRN
Qty: 1 INHALER | Refills: 11 | Status: SHIPPED | OUTPATIENT
Start: 2018-06-12 | End: 2019-04-05

## 2018-06-12 NOTE — TELEPHONE ENCOUNTER
Albuterol Inhaler  Last Written Prescription Date:  11/26/16  Last Fill Quantity: 1 inhaler,  # refills: 11   Last office visit: 5/8/2018 with Dr. Perkins  Future Office Visit:      Routing refill request to provider for review/approval because:  A break in medication  I asked patient what she has been using for the past 1.5 years-states that she was in hospice and the hospice provider was ordering this medication for her.  ERROL Villafuerte RN

## 2018-06-22 LAB
DLCOUNC-%PRED-PRE: 31 %
DLCOUNC-PRE: 5.68 ML/MIN/MMHG
DLCOUNC-PRED: 18.19 ML/MIN/MMHG
ERV-%PRED-PRE: 62 %
ERV-PRE: 0.21 L
ERV-PRED: 0.34 L
EXPTIME-PRE: 7.71 SEC
FEF2575-%PRED-PRE: 171 %
FEF2575-PRE: 2.73 L/SEC
FEF2575-PRED: 1.59 L/SEC
FEFMAX-%PRED-PRE: 113 %
FEFMAX-PRE: 5.36 L/SEC
FEFMAX-PRED: 4.7 L/SEC
FEV1-%PRED-PRE: 63 %
FEV1-PRE: 1.15 L
FEV1FEV6-PRE: 95 %
FEV1FEV6-PRED: 79 %
FEV1FVC-PRE: 94 %
FEV1FVC-PRED: 75 %
FEV1SVC-PRE: 95 %
FEV1SVC-PRED: 76 %
FIFMAX-PRE: 3.83 L/SEC
FVC-%PRED-PRE: 53 %
FVC-PRE: 1.22 L
FVC-PRED: 2.3 L
IC-%PRED-PRE: 48 %
IC-PRE: 0.99 L
IC-PRED: 2.03 L
VA-%PRED-PRE: 43 %
VA-PRE: 1.86 L
VC-%PRED-PRE: 50 %
VC-PRE: 1.2 L
VC-PRED: 2.37 L

## 2018-07-07 ENCOUNTER — HEALTH MAINTENANCE LETTER (OUTPATIENT)
Age: 73
End: 2018-07-07

## 2018-09-05 ENCOUNTER — OFFICE VISIT (OUTPATIENT)
Dept: PULMONOLOGY | Facility: CLINIC | Age: 73
End: 2018-09-05
Attending: INTERNAL MEDICINE
Payer: MEDICARE

## 2018-09-05 VITALS
BODY MASS INDEX: 29.64 KG/M2 | OXYGEN SATURATION: 97 % | HEART RATE: 105 BPM | HEIGHT: 59 IN | DIASTOLIC BLOOD PRESSURE: 76 MMHG | SYSTOLIC BLOOD PRESSURE: 135 MMHG | WEIGHT: 147 LBS | RESPIRATION RATE: 22 BRPM

## 2018-09-05 DIAGNOSIS — J84.9 ILD (INTERSTITIAL LUNG DISEASE) (H): ICD-10-CM

## 2018-09-05 DIAGNOSIS — J84.9 ILD (INTERSTITIAL LUNG DISEASE) (H): Primary | ICD-10-CM

## 2018-09-05 PROCEDURE — G0463 HOSPITAL OUTPT CLINIC VISIT: HCPCS | Mod: ZF

## 2018-09-05 ASSESSMENT — PAIN SCALES - GENERAL: PAINLEVEL: NO PAIN (0)

## 2018-09-05 NOTE — MR AVS SNAPSHOT
After Visit Summary   9/5/2018    Wanda Kaur    MRN: [id number]           Patient Information     Date Of Birth          1945        Visit Information        Provider Department      9/5/2018 11:30 AM Fernando Goetz MD [facility]        Today's Diagnoses     ILD (interstitial lung disease) (H)    -  1       Follow-ups after your visit        Follow-up notes from your care team     Return in about 6 months (around 3/5/2019).      Future tests that were ordered for you today     Open Future Orders        Priority Expected Expires Ordered    Spirometry, Breathing Capacity Routine 3/5/2019 10/5/2019 9/5/2018    HC DIFFUSING CAPACITY Routine 3/5/2019 9/5/2019 9/5/2018            Who to contact     If you have questions or need follow up information about today's clinic visit or your schedule please contact [facility] directly at [phone].  Normal or non-critical lab and imaging results will be communicated to you by TMhart, letter or phone within 4 business days after the clinic has received the results. If you do not hear from us within 7 days, please contact the clinic through Skyridert or phone. If you have a critical or abnormal lab result, we will notify you by phone as soon as possible.  Submit refill requests through [employer] or call your pharmacy and they will forward the refill request to us. Please allow 3 business days for your refill to be completed.          Additional Information About Your Visit        MyChart Information     [employer] gives you secure access to your electronic health record. If you see a primary care provider, you can also send messages to your care team and make appointments. If you have questions, please call your primary care clinic.  If you do not have a primary care provider, please call [phone] and they will assist you.        Care EveryWhere ID     This is your Care EveryWhere  "ID. This could be used by other organizations to access your [place] medical records  [id number]        Your Vitals Were     Pulse Respirations Height Pulse Oximetry BMI (Body Mass Index)       105 22 1.499 m (4' 11\") 97% 29.69 kg/m2        Blood Pressure from Last 3 Encounters:   09/05/18 135/76   05/30/18 105/58   05/08/18 130/60    Weight from Last 3 Encounters:   09/05/18 66.7 kg (147 lb)   05/30/18 67.1 kg (148 lb)   05/08/18 67.2 kg (148 lb 1.6 oz)                 Today's Medication Changes          These changes are accurate as of 9/5/18 11:59 PM.  If you have any questions, ask your nurse or doctor.               Start taking these medicines.        Dose/Directions    fluticasone-salmeterol 250-50 MCG/DOSE diskus inhaler   Commonly known as:  ADVAIR   Used for:  ILD (interstitial lung disease) (H)   Started by:  Fernando Goetz MD        Dose:  1 puff   Inhale 1 puff into the lungs 2 times daily   Quantity:  1 Inhaler   Refills:  3            Where to get your medicines      These medications were sent to [facility] [address] [place] [address] AT [facility] [address]  [address] [place] [address], [facility] [address]     Phone:  [phone]     fluticasone-salmeterol 250-50 MCG/DOSE diskus inhaler                Primary Care Provider Office Phone # Fax #    Oscar Delatorre [phone]       [facility] [place] [address]        Equal Access to Services     [facility]: [facility], marci watt, qasurinder kaalmelvin james. So [facility] [phone].    ATENCIÓN: Si habla español, tiene a hyde disposición servicios gratuitos de asistencia lingüística. Eileen al [phone].    We comply with applicable federal civil rights laws and [place] laws. We do not discriminate on the basis of race, color, national origin, age, disability, sex, sexual orientation, or gender identity.            Thank " you!     Thank you for choosing [facility]  for your care. Our goal is always to provide you with excellent care. Hearing back from our patients is one way we can continue to improve our services. Please take a few minutes to complete the written survey that you may receive in the mail after your visit with us. Thank you!             Your Updated Medication List - Protect others around you: Learn how to safely use, store and throw away your medicines at www.disposemymeds.org.          This list is accurate as of 9/5/18 11:59 PM.  Always use your most recent med list.                   Brand Name Dispense Instructions for use Diagnosis    * albuterol (2.5 MG/3ML) 0.083% neb solution      Take 1 vial by nebulization every 6 hours as needed for shortness of breath / dyspnea or wheezing        * albuterol 108 (90 Base) MCG/ACT inhaler    PROAIR HFA/PROVENTIL HFA/VENTOLIN HFA    1 Inhaler    Inhale 2 puffs into the lungs every 6 hours as needed for shortness of breath / dyspnea or wheezing    ILD (interstitial lung disease) (H)       fluticasone-salmeterol 250-50 MCG/DOSE diskus inhaler    ADVAIR    1 Inhaler    Inhale 1 puff into the lungs 2 times daily    ILD (interstitial lung disease) (H)       ibuprofen 200 MG tablet    ADVIL/MOTRIN     Take 400 mg by mouth every 4 hours as needed for mild pain        nebulizer/tubing/mouthpiece Kit     1 kit    Dispense all necessary supplies for nebulizer machine    Mild intermittent asthma       omeprazole 20 MG CR capsule    priLOSEC    30 capsule    Take 1 capsule (20 mg) by mouth daily    Esophageal reflux, ILD (interstitial lung disease) (H)       order for DME     1 Device    Please provide patient with liquid oxygen with home fill system.  Patient requires 6 liters oxygen per minute via nasal canula with activity.  Please provide patient with oximyzer. This is for lifetime use.    ILD (interstitial lung disease) (H)       order for DME     1  Month    Equipment being ordered:   Oxygen 3-6 L based on oxygen saturations Indication- fibrotic lung disease- hypoxemia O2 without oxygen 79%, with 4L oxygen at rest 4L, needs up to 6L with respiratory illness Needed 99 months    ILD (interstitial lung disease) (H), Methotrexate lung (H)       order for DME     1 Device    Equipment being ordered: Liquid oxygen, 4 L flow rate, needs for home, dispense supplies for 99 months, needed for interstitial lung disease- please send form if this does not work    ILD (interstitial lung disease) (H)       * predniSONE 20 MG tablet    DELTASONE    10 tablet    Take 2 tablets (40 mg) by mouth daily        * predniSONE 10 MG tablet    DELTASONE    40 tablet    Take 4 tablets by mouth for 3 days, then 3 tablets for 3 days, then 2 tablets for 3 days, then 1 tablet for 3 days then off    ILD (interstitial lung disease) (H)       sennosides 8.6 MG tablet    SENOKOT     Take 1 tablet by mouth daily as needed for constipation        Vitamin D (Cholecalciferol) 400 units Chew      Take 2 chew tab by mouth daily        * Notice:  This list has 4 medication(s) that are the same as other medications prescribed for you. Read the directions carefully, and ask your doctor or other care provider to review them with you.

## 2018-09-05 NOTE — LETTER
9/5/2018       RE: Wanda Kaur  [address] [place] [address]     Dear Colleague,    Thank you for referring your patient, Wnada Kaur, to the [facility] at [facility]. Please see a copy of my visit note below.    HPI:   Wanda Kaur is a 72 year old female with a h/o ILD, previously followed by Dr. Cruz here at the ILD Clinic at the [facility], here today for 6 month follow up.     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  She is thought to have seronegative rheumatoid arthritis and for a period of time was treated with methotrexate.  In 06/2016, she had an exacerbation of her ILD, possibly related to pneumonia, had bilateral infiltrates on CT scans and was hospitalized for a prolonged period of time.  In the aftermath of that, she was placed on CellCept and prednisone.  In 01/2017, she tapered herself off of those medications as she felt awful taking them. Because of the severity of her lung disease for a while she was on the lung transplant list.  She took herself off of the list because she decided that it was not for her. She also was in hospice care for a while, but because things were stable, she came out of hospice care.      She states that her dyspnea and exercise tolerance is stable. Worse with humidity and air quality. Stays indoors mostly. No recent illness, hospitalizations/ED visits. Feels much better off cellcept and prednisone. Uses albuterol more often- about every 2-3 hours. Feels significant benefit from it. Dyspneic with activity, takes 10-15 minutes to recover. Can get up and around the house, makes meals, feels winded after making bed. Spence beth on deck with brief breaks off oxygen- using 4L during day and 3L at night. Sleeping okay, wakes with coughing occasionally. Chronic productive  cough- no change, clear sputum. Did improve on prednisone.     ROS Pulmonary:  Dyspnea at Rest: Yes, Wheezing: No, Cough: Yes, Sputum Production: Yes, Hemoptysis: No, Chest pain: No. No  A complete ROS was otherwise negative except as noted in the HPI.      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     Other chronic pain      Oxygen dependent     4L during day and 3L at HS     Pain in joint, shoulder region     (hx of impingement both shoulders)     PONV (postoperative nausea and vomiting)      Right carotid bruit 11/3/2003     Sciatica     better with epidural steroid approx 2006       Past Surgical History:   Procedure Laterality Date     ABDOMEN SURGERY  20yrs plus    lap     ARTHROPLASTY SHOULDER  10/2010    right     ARTHROPLASTY SHOULDER  12/28/2011    Procedure:ARTHROPLASTY SHOULDER; Left Total Shoulder Arthroplasty  ; Surgeon:HERBERT LIAO; Location:[facility] OR     BIOPSY  2015    Vats proceedure for thymoma     BREAST SURGERY Left     breast cysts     C NONSPECIFIC PROCEDURE      L breast cysts      CARPAL TUNNEL RELEASE RT/LT      R carpal release     COLONOSCOPY       FORAMINOTOMY CERVICAL POSTERIOR TWO LEVELS N/A 4/9/2018    Procedure: FORAMINOTOMY CERVICAL POSTERIOR TWO LEVELS;  POSTERIOR CERVICAL FORAMINOTOMY C7-T1;  Surgeon: Jesus Camejo MD;  Location: [facility] OR     FUSION CERVICAL ANTERIOR ONE LEVEL      L c6-7 cervical disc with fusion     HC ESOPH/GAS REFLUX TEST W NASAL IMPED >1 HR N/A 9/22/2016    Procedure: ESOPHAGEAL IMPEDENCE FUNCTION TEST WITH 24 HOUR PH GREATER THAN 1 HOUR;  Surgeon: Bret Magdaleno MD;  Location: [facility]  "     LAPAROSCOPY, SURGICAL; CHOLECYSTECTOMY      lap choly      HEAD & NECK SURGERY       HYSTERECTOMY TOTAL ABDOMINAL, BILATERAL SALPINGO-OOPHORECTOMY, COMBINED       THORACOSCOPIC WEDGE RESECTION LUNG Right 3/24/2015    Procedure: THORACOSCOPIC WEDGE RESECTION LUNG;  Surgeon: Júnior Oakes MD;  Location:  OR       Social History     Social History     Marital status:      Spouse name: Murray      Number of children: 4     Years of education: N/A     Occupational History      [employer]     Social History Main Topics     Smoking status: Never Smoker     Smokeless tobacco: Never Used      Comment: smoked very briefly as a teen     Alcohol use 0.6 oz/week     1 Standard drinks or equivalent per week      Comment: 1 glass of wine daily     Drug use: No     Sexual activity: No     Other Topics Concern     Parent/Sibling W/ Cabg, Mi Or Angioplasty Before 65f 55m? No     Social History Narrative       Family History   Problem Relation Age of Onset     Respiratory Mother      COPD  at 64     HEART DISEASE Father       of CVA at 81     Cerebrovascular Disease Maternal Grandmother      Cerebrovascular Disease Paternal Grandmother        Current Outpatient Prescriptions   Medication     albuterol (2.5 MG/3ML) 0.083% neb solution     albuterol (PROAIR HFA/PROVENTIL HFA/VENTOLIN HFA) 108 (90 Base) MCG/ACT Inhaler     ibuprofen (ADVIL,MOTRIN) 200 MG tablet     omeprazole (PRILOSEC) 20 MG CR capsule     order for DME     order for DME     order for DME     predniSONE (DELTASONE) 10 MG tablet     predniSONE (DELTASONE) 20 MG tablet     Respiratory Therapy Supplies (NEBULIZER/TUBING/MOUTHPIECE) KIT     sennosides (SENOKOT) 8.6 MG tablet     Vitamin D, Cholecalciferol, 400 UNITS CHEW     No current facility-administered medications for this visit.        Allergies   Allergen Reactions     No Known Drug Allergies          Exam:  /76  Pulse 105  Resp 22  Ht 1.499 m (4' 11\")  " Wt 66.7 kg (147 lb)  SpO2 97%  BMI 29.69 kg/m2  GENERAL:  well developed, well nourished, alert, and in no apparent distress  HEENT:  no cervical lymphadenopathy, mucous membranes moist  RESP:  Speaking short sentences, slightly increased work of breathing, on NC, normal effort, symmetric airflow, basilar inspiratory dry crackles, no rhonchi, no wheezes, occasional cough   CV:  regular rhythm, regular rate, questionable soft 1/6 ALANIS LSB. S1S2  EXTREMITIES:  no clubbing, no cyanosis, no edema  SKIN:  Warm, dry, no rash on limited exam  NEURO:  A&Ox3, memory intact, no focal deficits   PSYCH: Affect appropriate, attentive, cooperative       Labs:  Recent Results (from the past 168 hour(s))   General PFT Lab (Please always keep checked)    Collection Time: 09/05/18 11:08 AM   Result Value Ref Range    FVC-Pred 2.29 L    FVC-Pre 1.20 L    FVC-%Pred-Pre 52 %    FEV1-Pre 1.14 L    FEV1-%Pred-Pre 63 %    FEV1FVC-Pred 75 %    FEV1FVC-Pre 95 %    FEFMax-Pred 4.68 L/sec    FEFMax-Pre 6.22 L/sec    FEFMax-%Pred-Pre 133 %    FEF2575-Pred 1.58 L/sec    FEF2575-Pre 2.36 L/sec    KTN5056-%Pred-Pre 149 %    ExpTime-Pre 5.86 sec    FIFMax-Pre 3.18 L/sec    VC-Pred 2.37 L    VC-Pre 1.24 L    VC-%Pred-Pre 52 %    IC-Pred 2.02 L    IC-Pre 0.83 L    IC-%Pred-Pre 41 %    ERV-Pred 0.35 L    ERV-Pre 0.41 L    ERV-%Pred-Pre 117 %    FEV1FEV6-Pred 79 %    FEV1FEV6-Pre 95 %    DLCOunc-Pred 18.16 ml/min/mmHg    DLCOunc-Pre 6.40 ml/min/mmHg    DLCOunc-%Pred-Pre 35 %    VA-Pre 1.85 L    VA-%Pred-Pre 43 %    FEV1SVC-Pred 76 %    FEV1SVC-Pre 92 %         Imaging:  CT 5/2018: IMPRESSION: No significant change in interstitial fibrotic changes in  the lungs likely connective tissue disease associated ILD with  fibrotic NSIP pattern.       Assessment and Plan:   Wanda Kaur is a 72 year old female with a h/o ILD on home oxygen, here today for 6 month follow up.   PFTs/DLCO stable. CT in May unchanged. Off all medications except albuterol, on  home O2. Discussed general prognosis and expectations of disease.     - Continue albuterol MDI PRN  - Trial advair 250/50 1 puff BID to see if dyspnea/cough/sputum improves  - Flu vaccination in October    Return to clinic in 6 months with PFTs/DLCO.     Patient seen and discussed with attending Dr. Goetz who agrees with the above assessment and plan.      Jannet Fischer MD, MPAS, PGY2      Again, thank you for allowing me to participate in the care of your patient.      Sincerely,    Fernando Goetz MD

## 2018-09-05 NOTE — PROGRESS NOTES
HPI:   Wanda Kaur is a 72 year old female with a h/o ILD, previously followed by Dr. Nancy anderson at the [facility] at the [facility], here today for 6 month follow up.     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  She is thought to have seronegative rheumatoid arthritis and for a period of time was treated with methotrexate.  In 06/2016, she had an exacerbation of her ILD, possibly related to pneumonia, had bilateral infiltrates on CT scans and was hospitalized for a prolonged period of time.  In the aftermath of that, she was placed on CellCept and prednisone.  In 01/2017, she tapered herself off of those medications as she felt awful taking them. Because of the severity of her lung disease for a while she was on the lung transplant list.  She took herself off of the list because she decided that it was not for her. She also was in hospice care for a while, but because things were stable, she came out of hospice care.      She states that her dyspnea and exercise tolerance is stable. Worse with humidity and air quality. Stays indoors mostly. No recent illness, hospitalizations/ED visits. Feels much better off cellcept and prednisone. Uses albuterol more often- about every 2-3 hours. Feels significant benefit from it. Dyspneic with activity, takes 10-15 minutes to recover. Can get up and around the house, makes meals, feels winded after making bed. Spence beth on deck with brief breaks off oxygen- using 4L during day and 3L at night. Sleeping okay, wakes with coughing occasionally. Chronic productive cough- no change, clear sputum. Did improve on prednisone.     ROS Pulmonary:  Dyspnea at Rest: Yes, Wheezing: No, Cough: Yes, Sputum Production: Yes, Hemoptysis: No, Chest pain: No. No  A complete ROS was otherwise negative except as noted in the HPI.      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     Other chronic pain      Oxygen dependent     4L during day and 3L at HS     Pain in joint, shoulder region     (hx of impingement both shoulders)     PONV (postoperative nausea and vomiting)      Right carotid bruit 11/3/2003     Sciatica     better with epidural steroid approx 2006       Past Surgical History:   Procedure Laterality Date     ABDOMEN SURGERY  20yrs plus    lap     ARTHROPLASTY SHOULDER  10/2010    right     ARTHROPLASTY SHOULDER  12/28/2011    Procedure:ARTHROPLASTY SHOULDER; Left Total Shoulder Arthroplasty  ; Surgeon:HERBERT LIAO; Location:[facility] OR     BIOPSY  2015    Vats proceedure for thymoma     BREAST SURGERY Left     breast cysts     C NONSPECIFIC PROCEDURE      L breast cysts      CARPAL TUNNEL RELEASE RT/LT      R carpal release     COLONOSCOPY       FORAMINOTOMY CERVICAL POSTERIOR TWO LEVELS N/A 4/9/2018    Procedure: FORAMINOTOMY CERVICAL POSTERIOR TWO LEVELS;  POSTERIOR CERVICAL FORAMINOTOMY C7-T1;  Surgeon: Jesus Camejo MD;  Location: [facility] OR     FUSION CERVICAL ANTERIOR ONE LEVEL      L c6-7 cervical disc with fusion     HC ESOPH/GAS REFLUX TEST W NASAL IMPED >1 HR N/A 9/22/2016    Procedure: ESOPHAGEAL IMPEDENCE FUNCTION TEST WITH 24 HOUR PH GREATER THAN 1 HOUR;  Surgeon: Bret Magdaleno MD;  Location: [facility]     HC LAPAROSCOPY, SURGICAL; CHOLECYSTECTOMY      lap choly 1990's     HEAD & NECK SURGERY       HYSTERECTOMY TOTAL ABDOMINAL, BILATERAL SALPINGO-OOPHORECTOMY, COMBINED       THORACOSCOPIC WEDGE RESECTION LUNG Right 3/24/2015    Procedure: THORACOSCOPIC WEDGE RESECTION LUNG;  Surgeon: Júnior Oakes,  "MD;  Location:  OR       Social History     Social History     Marital status:      Spouse name: Murray      Number of children: 4     Years of education: N/A     Occupational History      [employer]     Social History Main Topics     Smoking status: Never Smoker     Smokeless tobacco: Never Used      Comment: smoked very briefly as a teen     Alcohol use 0.6 oz/week     1 Standard drinks or equivalent per week      Comment: 1 glass of wine daily     Drug use: No     Sexual activity: No     Other Topics Concern     Parent/Sibling W/ Cabg, Mi Or Angioplasty Before 65f 55m? No     Social History Narrative       Family History   Problem Relation Age of Onset     Respiratory Mother      COPD  at 64     HEART DISEASE Father       of CVA at 81     Cerebrovascular Disease Maternal Grandmother      Cerebrovascular Disease Paternal Grandmother        Current Outpatient Prescriptions   Medication     albuterol (2.5 MG/3ML) 0.083% neb solution     albuterol (PROAIR HFA/PROVENTIL HFA/VENTOLIN HFA) 108 (90 Base) MCG/ACT Inhaler     ibuprofen (ADVIL,MOTRIN) 200 MG tablet     omeprazole (PRILOSEC) 20 MG CR capsule     order for DME     order for DME     order for DME     predniSONE (DELTASONE) 10 MG tablet     predniSONE (DELTASONE) 20 MG tablet     Respiratory Therapy Supplies (NEBULIZER/TUBING/MOUTHPIECE) KIT     sennosides (SENOKOT) 8.6 MG tablet     Vitamin D, Cholecalciferol, 400 UNITS CHEW     No current facility-administered medications for this visit.        Allergies   Allergen Reactions     No Known Drug Allergies          Exam:  /76  Pulse 105  Resp 22  Ht 1.499 m (4' 11\")  Wt 66.7 kg (147 lb)  SpO2 97%  BMI 29.69 kg/m2  GENERAL:  well developed, well nourished, alert, and in no apparent distress  HEENT:  no cervical lymphadenopathy, mucous membranes moist  RESP:  Speaking short sentences, slightly increased work of breathing, on NC, normal effort, symmetric airflow, basilar " inspiratory dry crackles, no rhonchi, no wheezes, occasional cough   CV:  regular rhythm, regular rate, questionable soft 1/6 ALANIS LSB. S1S2  EXTREMITIES:  no clubbing, no cyanosis, no edema  SKIN:  Warm, dry, no rash on limited exam  NEURO:  A&Ox3, memory intact, no focal deficits   PSYCH: Affect appropriate, attentive, cooperative       Labs:  Recent Results (from the past 168 hour(s))   General PFT Lab (Please always keep checked)    Collection Time: 09/05/18 11:08 AM   Result Value Ref Range    FVC-Pred 2.29 L    FVC-Pre 1.20 L    FVC-%Pred-Pre 52 %    FEV1-Pre 1.14 L    FEV1-%Pred-Pre 63 %    FEV1FVC-Pred 75 %    FEV1FVC-Pre 95 %    FEFMax-Pred 4.68 L/sec    FEFMax-Pre 6.22 L/sec    FEFMax-%Pred-Pre 133 %    FEF2575-Pred 1.58 L/sec    FEF2575-Pre 2.36 L/sec    ZJV1160-%Pred-Pre 149 %    ExpTime-Pre 5.86 sec    FIFMax-Pre 3.18 L/sec    VC-Pred 2.37 L    VC-Pre 1.24 L    VC-%Pred-Pre 52 %    IC-Pred 2.02 L    IC-Pre 0.83 L    IC-%Pred-Pre 41 %    ERV-Pred 0.35 L    ERV-Pre 0.41 L    ERV-%Pred-Pre 117 %    FEV1FEV6-Pred 79 %    FEV1FEV6-Pre 95 %    DLCOunc-Pred 18.16 ml/min/mmHg    DLCOunc-Pre 6.40 ml/min/mmHg    DLCOunc-%Pred-Pre 35 %    VA-Pre 1.85 L    VA-%Pred-Pre 43 %    FEV1SVC-Pred 76 %    FEV1SVC-Pre 92 %         Imaging:  CT 5/2018: IMPRESSION: No significant change in interstitial fibrotic changes in  the lungs likely connective tissue disease associated ILD with  fibrotic NSIP pattern.       Assessment and Plan:   Wanda Kaur is a 72 year old female with a h/o ILD on home oxygen, here today for 6 month follow up.   PFTs/DLCO stable. CT in May unchanged. Off all medications except albuterol, on home O2. Discussed general prognosis and expectations of disease.     - Continue albuterol MDI PRN  - Trial advair 250/50 1 puff BID to see if dyspnea/cough/sputum improves  - Flu vaccination in October    Return to clinic in 6 months with PFTs/DLCO.     Patient seen and discussed with attending Dr. Goetz  who agrees with the above assessment and plan.      Jannet Fischer MD, MPAS, PGY2

## 2018-09-05 NOTE — NURSING NOTE
Chief Complaint   Patient presents with     RECHECK     Interstitial Lung RA/ILD    Reji Renee, CMA

## 2018-09-17 ENCOUNTER — CARE COORDINATION (OUTPATIENT)
Dept: PULMONOLOGY | Facility: CLINIC | Age: 73
End: 2018-09-17

## 2018-09-17 NOTE — PROGRESS NOTES
Patient called stating that since she started taking Advair last week she has had worsening tachypnea with a respiratory rate in the 60's and 70's for the past three days. She sates that her oxygen saturation has stayed in the mid to upper 90's on her usual 4 liters of oxygen.  She denies increased cough, fever or sputum production.  Instructed patient that she needs to be seen in the ER.  Patient does not feel like she is in respiratory distress and does not want to go to the ER.  She would like to try a burst of prednisone instead.  Discussed with Dr. Goetz who agreed that patient needs to be evaluated in the ER.  Informed patient of Dr. Goetz's strong recommendation and she would like to think about it for an hour or so before she decides.  She will call us back to let us know what she decides.

## 2018-09-26 LAB
DLCOUNC-%PRED-PRE: 35 %
DLCOUNC-PRE: 6.4 ML/MIN/MMHG
DLCOUNC-PRED: 18.16 ML/MIN/MMHG
ERV-%PRED-PRE: 117 %
ERV-PRE: 0.41 L
ERV-PRED: 0.35 L
EXPTIME-PRE: 5.86 SEC
FEF2575-%PRED-PRE: 149 %
FEF2575-PRE: 2.36 L/SEC
FEF2575-PRED: 1.58 L/SEC
FEFMAX-%PRED-PRE: 133 %
FEFMAX-PRE: 6.22 L/SEC
FEFMAX-PRED: 4.68 L/SEC
FEV1-%PRED-PRE: 63 %
FEV1-PRE: 1.14 L
FEV1FEV6-PRE: 95 %
FEV1FEV6-PRED: 79 %
FEV1FVC-PRE: 95 %
FEV1FVC-PRED: 75 %
FEV1SVC-PRE: 92 %
FEV1SVC-PRED: 76 %
FIFMAX-PRE: 3.18 L/SEC
FVC-%PRED-PRE: 52 %
FVC-PRE: 1.2 L
FVC-PRED: 2.29 L
IC-%PRED-PRE: 41 %
IC-PRE: 0.83 L
IC-PRED: 2.02 L
VA-%PRED-PRE: 43 %
VA-PRE: 1.85 L
VC-%PRED-PRE: 52 %
VC-PRE: 1.24 L
VC-PRED: 2.37 L

## 2019-02-13 ENCOUNTER — TELEPHONE (OUTPATIENT)
Dept: PULMONOLOGY | Facility: CLINIC | Age: 74
End: 2019-02-13

## 2019-02-13 NOTE — TELEPHONE ENCOUNTER
[facility] Call Center    Phone Message    May a detailed message be left on voicemail: yes    Reason for Call: Other: Brendan was calling to confirm if the prescription he had faxed over was received and if the provider had the chance to look at it. Brendan is still waiting for the prescription to be signed so the Pt can receive the machine. Please call back at earliest convenience.      Action Taken: Message routed to:  [facility] ([facility] Pulm

## 2019-02-15 NOTE — TELEPHONE ENCOUNTER
[facility] Call Center    Phone Message    May a detailed message be left on voicemail: yes    Reason for Call: Other: Brendan sent prescription over a week ago and has not heard back, Brendan faxed over the prescription again this morning and needs a call back today to confirm it has been recieved so that pt can recieve the oxygen machine.     Action Taken: Message routed to:  [facility] ([facility] pulmonology

## 2019-03-04 ENCOUNTER — DOCUMENTATION ONLY (OUTPATIENT)
Dept: CARE COORDINATION | Facility: CLINIC | Age: 74
End: 2019-03-04

## 2019-03-13 ENCOUNTER — OFFICE VISIT (OUTPATIENT)
Dept: PULMONOLOGY | Facility: CLINIC | Age: 74
End: 2019-03-13
Attending: INTERNAL MEDICINE
Payer: COMMERCIAL

## 2019-03-13 VITALS
HEART RATE: 94 BPM | SYSTOLIC BLOOD PRESSURE: 149 MMHG | DIASTOLIC BLOOD PRESSURE: 68 MMHG | OXYGEN SATURATION: 94 % | WEIGHT: 147 LBS | BODY MASS INDEX: 29.64 KG/M2 | RESPIRATION RATE: 16 BRPM | HEIGHT: 59 IN

## 2019-03-13 DIAGNOSIS — J84.9 ILD (INTERSTITIAL LUNG DISEASE) (H): Primary | ICD-10-CM

## 2019-03-13 PROCEDURE — G0463 HOSPITAL OUTPT CLINIC VISIT: HCPCS | Mod: ZF

## 2019-03-13 ASSESSMENT — PAIN SCALES - GENERAL: PAINLEVEL: NO PAIN (0)

## 2019-03-13 ASSESSMENT — MIFFLIN-ST. JEOR: SCORE: 1077.67

## 2019-03-13 NOTE — LETTER
3/13/2019       RE: Wanda Kaur  [address] [place] [address]     Dear Colleague,    Thank you for referring your patient, Wanda Kaur, to the [facility] at [facility]. Please see a copy of my visit note below.    HISTORY OF PRESENT ILLNESS:  The patient is a 73-year-old female with interstitial lung disease of unclear etiology, thought to be an ILD related to seronegative rheumatoid arthritis, although IPF remains a possibility.  In 2016, she had an exacerbation of ILD possibly related to pneumonia and was hospitalized for a prolonged period of time.  In the aftermath of that she was placed on CellCept and prednisone and she said she felt awful while being on CellCept and prednisone and in 2017 she tapered herself off those medications and since that point in time, she has been using albuterol inhaler and albuterol nebs for her lung disease in addition to oxygen.      She continues to use albuterol about every 2 hours.  She has noted no colds or respiratory tract infections.  Her breathing is roughly about the same.  She uses 3 liters of oxygen at rest and 4 liters with activity.      REVIEW OF SYSTEMS:     CARDIAC:  She denies exertional chest pain, syncope, dizziness or palpitations.   GASTROINTESTINAL:  She denies reflux or heartburn.   MUSCULOSKELETAL:  She has obvious arthritic changes in her hands but does not think that she has any active arthritis at this point in time.     The rest of the comprehensive review of systems is negative.      PHYSICAL EXAMINATION:   VITAL SIGNS:  Her blood pressure was slightly elevated at 149/68, pulse is 94, respiratory rate was 16, O2 sats are 94% on 3 liters of oxygen.   HEENT:  Eyes are anicteric.  Mouth and throat are without lesions.   NECK:  Supple without thyromegaly or adenopathy.   CHEST:  She has crackles half way up bilaterally.   HEART:  Regular rate and rhythm,  normal S1 and S2, no murmur appreciated.   ABDOMEN:  Somewhat overweight but nontender, no hepatosplenomegaly.   EXTREMITIES:  Without clubbing, cyanosis, edema.   MUSCULOSKELETAL:  Obvious arthritic changes in the fingers.  I do not see bogginess in joints or redness of any of her joints currently.   SKIN:  Some patches of eczema but otherwise negative.      PFT:  Her FEV1 was 1.05 or 58% of predicted, FVC 1.26 or 55% of predicted.  Diffusion capacity 48% of predicted.  All of these are stable compared to what they previously had been in 2018.      ASSESSMENT AND PLAN:  In summary then, Wanda Kaur is a 73-year-old woman with interstitial lung disease.  The etiology is not clear.  Her CT scan is suggestive of fibrotic NSIP. IPF is less likely. Given the relative stability of her pulmonary fibrosis the diagnosis would favor fibrotic NSIP versus IPF.  Since I do not have evidence that her fibrosis is progressing, we have not instituted specific treatment for her ILD.  Of note, in the past she has poorly tolerated prednisone and CellCept so if there is evidence of fibrotic progression in the future she would likely need to be on antifibrotic agent.  She has a continuing cough.  She does use albuterol inhaler.  She has been using it every 2 hours.  I will give her a Serevent inhaler to see whether or not that helps reduce her need for the short-acting bronchodilator.  I plan to see the patient back in 3 months with repeat PFTs, chest CT scan and 6-minute walk.     Fernando Goetz  Pulmonary/Critical Care  March 14, 2019 10:38 AM

## 2019-03-13 NOTE — PROGRESS NOTES
HISTORY OF PRESENT ILLNESS:  The patient is a 73-year-old female with interstitial lung disease of unclear etiology, thought to be an ILD related to seronegative rheumatoid arthritis, although IPF remains a possibility.  In 2016, she had an exacerbation of ILD possibly related to pneumonia and was hospitalized for a prolonged period of time.  In the aftermath of that she was placed on CellCept and prednisone and she said she felt awful while being on CellCept and prednisone and in 2017 she tapered herself off those medications and since that point in time, she has been using albuterol inhaler and albuterol nebs for her lung disease in addition to oxygen.      She continues to use albuterol about every 2 hours.  She has noted no colds or respiratory tract infections.  Her breathing is roughly about the same.  She uses 3 liters of oxygen at rest and 4 liters with activity.      REVIEW OF SYSTEMS:     CARDIAC:  She denies exertional chest pain, syncope, dizziness or palpitations.   GASTROINTESTINAL:  She denies reflux or heartburn.   MUSCULOSKELETAL:  She has obvious arthritic changes in her hands but does not think that she has any active arthritis at this point in time.     The rest of the comprehensive review of systems is negative.      PHYSICAL EXAMINATION:   VITAL SIGNS:  Her blood pressure was slightly elevated at 149/68, pulse is 94, respiratory rate was 16, O2 sats are 94% on 3 liters of oxygen.   HEENT:  Eyes are anicteric.  Mouth and throat are without lesions.   NECK:  Supple without thyromegaly or adenopathy.   CHEST:  She has crackles half way up bilaterally.   HEART:  Regular rate and rhythm, normal S1 and S2, no murmur appreciated.   ABDOMEN:  Somewhat overweight but nontender, no hepatosplenomegaly.   EXTREMITIES:  Without clubbing, cyanosis, edema.   MUSCULOSKELETAL:  Obvious arthritic changes in the fingers.  I do not see bogginess in joints or redness of any of her joints currently.   SKIN:  Some  patches of eczema but otherwise negative.      PFT:  Her FEV1 was 1.05 or 58% of predicted, FVC 1.26 or 55% of predicted.  Diffusion capacity 48% of predicted.  All of these are stable compared to what they previously had been in 2018.      ASSESSMENT AND PLAN:  In summary then, Wanda Kaur is a 73-year-old woman with interstitial lung disease.  The etiology is not clear.  Her CT scan is suggestive of fibrotic NSIP. IPF is less likely. Given the relative stability of her pulmonary fibrosis the diagnosis would favor fibrotic NSIP versus IPF.  Since I do not have evidence that her fibrosis is progressing, we have not instituted specific treatment for her ILD.  Of note, in the past she has poorly tolerated prednisone and CellCept so if there is evidence of fibrotic progression in the future she would likely need to be on antifibrotic agent.  She has a continuing cough.  She does use albuterol inhaler.  She has been using it every 2 hours.  I will give her a Serevent inhaler to see whether or not that helps reduce her need for the short-acting bronchodilator.  I plan to see the patient back in 3 months with repeat PFTs, chest CT scan and 6-minute walk.         Fernando Goetz  Pulmonary/Critical Care  March 14, 2019 10:38 AM

## 2019-03-13 NOTE — NURSING NOTE
Chief Complaint   Patient presents with     Interstitial Lung Disease (ILD)     IPF/NSIP follow up      Kaylah Burns CMA

## 2019-03-14 LAB
DLCOUNC-%PRED-PRE: 48 %
DLCOUNC-PRE: 7.87 ML/MIN/MMHG
DLCOUNC-PRED: 16.14 ML/MIN/MMHG
ERV-%PRED-PRE: 98 %
ERV-PRE: 0.34 L
ERV-PRED: 0.35 L
EXPTIME-PRE: 6.37 SEC
FEF2575-%PRED-PRE: 61 %
FEF2575-PRE: 0.96 L/SEC
FEF2575-PRED: 1.58 L/SEC
FEFMAX-%PRED-PRE: 57 %
FEFMAX-PRE: 2.67 L/SEC
FEFMAX-PRED: 4.67 L/SEC
FEV1-%PRED-PRE: 58 %
FEV1-PRE: 1.05 L
FEV1FEV6-PRE: 82 %
FEV1FEV6-PRED: 79 %
FEV1FVC-PRE: 83 %
FEV1FVC-PRED: 78 %
FEV1SVC-PRE: 79 %
FEV1SVC-PRED: 76 %
FIFMAX-PRE: 3.4 L/SEC
FVC-%PRED-PRE: 55 %
FVC-PRE: 1.26 L
FVC-PRED: 2.29 L
IC-%PRED-PRE: 49 %
IC-PRE: 0.99 L
IC-PRED: 2.02 L
VA-%PRED-PRE: 55 %
VA-PRE: 2.11 L
VC-%PRED-PRE: 56 %
VC-PRE: 1.34 L
VC-PRED: 2.36 L

## 2019-03-28 ENCOUNTER — OFFICE VISIT (OUTPATIENT)
Dept: PEDIATRICS | Facility: CLINIC | Age: 74
End: 2019-03-28
Payer: COMMERCIAL

## 2019-03-28 VITALS
BODY MASS INDEX: 29.67 KG/M2 | TEMPERATURE: 98.6 F | DIASTOLIC BLOOD PRESSURE: 64 MMHG | RESPIRATION RATE: 18 BRPM | OXYGEN SATURATION: 98 % | HEART RATE: 95 BPM | SYSTOLIC BLOOD PRESSURE: 120 MMHG | WEIGHT: 147 LBS

## 2019-03-28 DIAGNOSIS — M06.9 RHEUMATOID ARTHRITIS, INVOLVING UNSPECIFIED SITE, UNSPECIFIED RHEUMATOID FACTOR PRESENCE: ICD-10-CM

## 2019-03-28 DIAGNOSIS — Z79.899 OTHER LONG TERM (CURRENT) DRUG THERAPY: ICD-10-CM

## 2019-03-28 DIAGNOSIS — R53.83 FATIGUE, UNSPECIFIED TYPE: ICD-10-CM

## 2019-03-28 DIAGNOSIS — Z13.220 ENCOUNTER FOR LIPID SCREENING FOR CARDIOVASCULAR DISEASE: ICD-10-CM

## 2019-03-28 DIAGNOSIS — Z53.9 ERRONEOUS ENCOUNTER--DISREGARD: Primary | ICD-10-CM

## 2019-03-28 DIAGNOSIS — E55.9 VITAMIN D DEFICIENCY: ICD-10-CM

## 2019-03-28 DIAGNOSIS — Z12.31 ENCOUNTER FOR SCREENING MAMMOGRAM FOR MALIGNANT NEOPLASM OF BREAST: ICD-10-CM

## 2019-03-28 DIAGNOSIS — Z00.00 MEDICARE ANNUAL WELLNESS VISIT, SUBSEQUENT: Primary | ICD-10-CM

## 2019-03-28 DIAGNOSIS — J84.9 ILD (INTERSTITIAL LUNG DISEASE) (H): ICD-10-CM

## 2019-03-28 DIAGNOSIS — Z13.6 ENCOUNTER FOR LIPID SCREENING FOR CARDIOVASCULAR DISEASE: ICD-10-CM

## 2019-03-28 LAB
CRP SERPL-MCNC: 3.8 MG/L (ref 0–8)
ERYTHROCYTE [SEDIMENTATION RATE] IN BLOOD BY WESTERGREN METHOD: 40 MM/H (ref 0–30)
HBA1C MFR BLD: 5.4 % (ref 0–5.6)

## 2019-03-28 PROCEDURE — 86038 ANTINUCLEAR ANTIBODIES: CPT | Performed by: INTERNAL MEDICINE

## 2019-03-28 PROCEDURE — 99213 OFFICE O/P EST LOW 20 MIN: CPT | Mod: 25 | Performed by: INTERNAL MEDICINE

## 2019-03-28 PROCEDURE — 99397 PER PM REEVAL EST PAT 65+ YR: CPT | Performed by: INTERNAL MEDICINE

## 2019-03-28 PROCEDURE — 86431 RHEUMATOID FACTOR QUANT: CPT | Performed by: INTERNAL MEDICINE

## 2019-03-28 PROCEDURE — 86039 ANTINUCLEAR ANTIBODIES (ANA): CPT | Performed by: INTERNAL MEDICINE

## 2019-03-28 PROCEDURE — 80053 COMPREHEN METABOLIC PANEL: CPT | Performed by: INTERNAL MEDICINE

## 2019-03-28 PROCEDURE — 83036 HEMOGLOBIN GLYCOSYLATED A1C: CPT | Performed by: INTERNAL MEDICINE

## 2019-03-28 PROCEDURE — 86140 C-REACTIVE PROTEIN: CPT | Performed by: INTERNAL MEDICINE

## 2019-03-28 PROCEDURE — 82306 VITAMIN D 25 HYDROXY: CPT | Performed by: INTERNAL MEDICINE

## 2019-03-28 PROCEDURE — 36415 COLL VENOUS BLD VENIPUNCTURE: CPT | Performed by: INTERNAL MEDICINE

## 2019-03-28 PROCEDURE — 80061 LIPID PANEL: CPT | Performed by: INTERNAL MEDICINE

## 2019-03-28 PROCEDURE — 84443 ASSAY THYROID STIM HORMONE: CPT | Performed by: INTERNAL MEDICINE

## 2019-03-28 PROCEDURE — 85652 RBC SED RATE AUTOMATED: CPT | Performed by: INTERNAL MEDICINE

## 2019-03-28 NOTE — PROGRESS NOTES
"  SUBJECTIVE:   Wanda Kaur is a 73 year old female who presents to clinic today for the following health issues:      Fatigue      Duration: few months    Description (location/character/radiation): Patient has been feeling more tired     Intensity:  moderate    Accompanying signs and symptoms: none    History (similar episodes/previous evaluation): None    Precipitating or alleviating factors: None    Therapies tried and outcome: None     Patient would like to discuss mild back pain she has had for a few months.  Reports had some soreness in her bilateral flank region which has since improved - no dysuria or changes in urine.    PMH of chronic respiratory failure due to ILD (2016) - followed by pulmonology - etiology is still unclear and differential based on last visit on 3/13/2018 fibrotic NSIP vs IPF (less likely).  This has been stable according to last note.  On oxygen.    RA vs some other connective tissue disease - currently stable off of immunosuppressive medications.  Long history of management with DMARDs.  Used to be on methotrexate and various other immunosuppressives including high doses of prednisone.  Had a few doses of rituximab.  No longer followed by rheumatology.  Tapered herself off of these medications because she felt \"awful\" and has been off since.  Has occasional pain on joints of fingers.  No major inflammation like she used to get.    Is here due to fatigue that has been worsening over the last few months.  Feels like she can fall asleep at any time.  Low energy.    No fevers, chills.  Appetite changes (low always).  No weight loss.  + lose stools, but this is on and off and chronic  + rash - long standing, but has had newer lesions - pink little spots on arms and legs    Annual Wellness Visit    Are you in the first 12 months of your Medicare Part B coverage?  No    Physical Health:    In general, how would you rate your overall physical health? fair    If you drink alcohol do " "you typically have >3 drinks per day or >7 drinks per week? No    Do you usually eat at least 4 servings of fruit and vegetables a day, include whole grains & fiber and avoid regularly eating high fat or \"junk\" foods? Yes for the most part    Needs assistance for the following daily activities: shopping, preparing meals and housework    Which of the following safety concerns are present in your home?  throw rugs in the hallway and no issues with falls ever     Hearing impairment: No    In the past 6 months, have you been bothered by leaking of urine? Yes, sometimes when coughing    Mental Health:    In general, how would you rate your overall mental or emotional health? good  PHQ-2 Score:      Do you feel safe in your environment? Yes    Do you have a Health Care Directive? Yes: Advance Directive has been received and scanned.    Fall risk:  Fall Risk Assessment not completed.  click delete button to remove this line now  Cognitive Screenin) Repeat 3 items (Leader, Season, Table)    2) Clock draw: NORMAL  3) 3 item recall:   Recalls 3 objects  Results: 3 items recalled: COGNITIVE IMPAIRMENT LESS LIKELY    Mini-CogTM Copyright S Haylee. Licensed by the author for use in [facility]; reprinted with permission (soob@[facility]). All rights reserved.      Current providers sharing in care for this patient include:     Patient Care Team:  Oscar Delatorre MD as PCP - General (Internal Medicine)  Viktor Vincent (Rheumatology)  Oscar Delatorre MD as MD (Internal Medicine)  Miya Parker, RN as Nurse Coordinator (Pulmonary)  Fernando Goetz MD as MD (Pulmonary)  Nadya Perkins MD as Assigned PCP        Do you have sleep apnea, excessive snoring or daytime drowsiness?: no    Problem list and histories reviewed & adjusted, as indicated.  Additional history: as documented    Patient Active Problem List   Diagnosis     Other symptoms involving cardiovascular system     Enthesopathy of hip " region     Osteoarthrosis, hand     CARDIOVASCULAR SCREENING; LDL GOAL LESS THAN 160     Advance Care Planning     Mediastinal mass     Enthesopathy of hip region, unspecified laterality     Neck pain     Rheumatoid arthritis, involving unspecified site, unspecified rheumatoid factor presence (H)     Immunosuppression (H)     Pneumonia of both upper lobes     Hypoxia     Health Care Home     Pneumonia of both lungs due to methicillin resistant Staphylococcus aureus (MRSA), unspecified part of lung (H)     Methotrexate lung     Acute respiratory failure with hypoxia (H)     ILD (interstitial lung disease) (H)     Sick-euthyroid syndrome     Anemia, unspecified type     Abnormal CT of the chest     Status post coronary angiogram     Past Surgical History:   Procedure Laterality Date     ABDOMEN SURGERY  20yrs plus    lap     ARTHROPLASTY SHOULDER  10/2010    right     ARTHROPLASTY SHOULDER  12/28/2011    Procedure:ARTHROPLASTY SHOULDER; Left Total Shoulder Arthroplasty  ; Surgeon:HERBERT LIAO; Location:[facility] OR     BIOPSY  2015    Vats proceedure for thymoma     BREAST SURGERY Left     breast cysts     C NONSPECIFIC PROCEDURE      L breast cysts      CARPAL TUNNEL RELEASE RT/LT      R carpal release     COLONOSCOPY       FORAMINOTOMY CERVICAL POSTERIOR TWO LEVELS N/A 4/9/2018    Procedure: FORAMINOTOMY CERVICAL POSTERIOR TWO LEVELS;  POSTERIOR CERVICAL FORAMINOTOMY C7-T1;  Surgeon: Jseus Camejo MD;  Location: [facility] OR     FUSION CERVICAL ANTERIOR ONE LEVEL      L c6-7 cervical disc with fusion     HC ESOPH/GAS REFLUX TEST W NASAL IMPED >1 HR N/A 9/22/2016    Procedure: ESOPHAGEAL IMPEDENCE FUNCTION TEST WITH 24 HOUR PH GREATER THAN 1 HOUR;  Surgeon: Bret Magdaleno MD;  Location: [facility]     HC LAPAROSCOPY, SURGICAL; CHOLECYSTECTOMY      lap choly 1990's     HEAD & NECK SURGERY       HYSTERECTOMY TOTAL ABDOMINAL, BILATERAL SALPINGO-OOPHORECTOMY, COMBINED       THORACOSCOPIC WEDGE RESECTION LUNG Right  3/24/2015    Procedure: THORACOSCOPIC WEDGE RESECTION LUNG;  Surgeon: Júnior Oakes MD;  Location:  OR       Social History     Tobacco Use     Smoking status: Never Smoker     Smokeless tobacco: Never Used     Tobacco comment: smoked very briefly as a teen   Substance Use Topics     Alcohol use: Yes     Alcohol/week: 0.6 oz     Types: 1 Standard drinks or equivalent per week     Comment: 1 glass of wine daily     Family History   Problem Relation Age of Onset     Respiratory Mother         COPD  at 64     Heart Disease Father          of CVA at 81     Cerebrovascular Disease Maternal Grandmother      Cerebrovascular Disease Paternal Grandmother          Current Outpatient Medications   Medication Sig Dispense Refill     albuterol (PROAIR HFA/PROVENTIL HFA/VENTOLIN HFA) 108 (90 Base) MCG/ACT Inhaler Inhale 2 puffs into the lungs every 6 hours as needed for shortness of breath / dyspnea or wheezing 1 Inhaler 11     ibuprofen (ADVIL,MOTRIN) 200 MG tablet Take 400 mg by mouth every 4 hours as needed for mild pain       order for DME Equipment being ordered: Liquid oxygen, 4 L flow rate, needs for home, dispense supplies for 99 months, needed for interstitial lung disease- please send form if this does not work 1 Device 11     order for DME Equipment being ordered:     Oxygen 3-6 L based on oxygen saturations  Indication- fibrotic lung disease- hypoxemia  O2 without oxygen 79%, with 4L oxygen at rest 4L, needs up to 6L with respiratory illness  Needed 99 months 1 Month 11     order for DME Please provide patient with liquid oxygen with home fill system.  Patient requires 6 liters oxygen per minute via nasal canula with activity.  Please provide patient with oximyzer. This is for lifetime use. 1 Device 0     sennosides (SENOKOT) 8.6 MG tablet Take 1 tablet by mouth daily as needed for constipation       Vitamin D, Cholecalciferol, 400 UNITS CHEW Take 2 chew tab by mouth daily       albuterol (2.5  MG/3ML) 0.083% neb solution Take 1 vial by nebulization every 6 hours as needed for shortness of breath / dyspnea or wheezing       Respiratory Therapy Supplies (NEBULIZER/TUBING/MOUTHPIECE) KIT Dispense all necessary supplies for nebulizer machine (Patient not taking: Reported on 3/28/2019) 1 kit 99     Allergies   Allergen Reactions     No Known Drug Allergies        Reviewed and updated as needed this visit by clinical staff       Reviewed and updated as needed this visit by Provider         ROS:  All other systems on a 10-point review are negative, unless otherwise noted in HPI      OBJECTIVE:     /64 (BP Location: Right arm, Patient Position: Chair, Cuff Size: Adult Regular)   Pulse 95   Temp 98.6  F (37  C) (Tympanic)   Resp 18   Wt 66.7 kg (147 lb)   LMP  (LMP Unknown)   SpO2 98%   BMI 29.67 kg/m    Body mass index is 29.67 kg/m .  GENERAL: healthy, alert and no distress  EYES: Eyes grossly normal to inspection, PERRL and conjunctivae and sclerae normal  HENT: ear canals and TM's normal, nose and mouth without ulcers or lesions  NECK: no adenopathy, no asymmetry, masses, or scars and thyroid normal to palpation  RESP: lungs clear to auscultation - no rales, rhonchi or wheezes and is on chronic oxygen supplementation  CV: regular rate and rhythm, normal S1 S2, no S3 or S4, no murmur, click or rub, no peripheral edema and peripheral pulses strong  ABDOMEN: soft, nontender, no hepatosplenomegaly, no masses and bowel sounds normal  MS: no gross musculoskeletal defects noted, no edema  SKIN: no suspicious lesions or rashes  NEURO: Normal strength and tone, mentation intact and speech normal  PSYCH: mentation appears normal, affect normal/bright    Diagnostic Test Results:  none       ASSESSMENT / PLAN:       ICD-10-CM    1. Medicare annual wellness visit, subsequent Z00.00    2. Fatigue, unspecified type R53.83 TSH with free T4 reflex     Comprehensive metabolic panel     Hemoglobin A1c     Vitamin D  Deficiency     Anti Nuclear Melia IgG by IFA with Reflex     Rheumatoid factor     CRP, inflammation     ESR: Erythrocyte sedimentation rate   3. ILD (interstitial lung disease) (H) J84.9    4. Rheumatoid arthritis, involving unspecified site, unspecified rheumatoid factor presence (H) M06.9 TSH with free T4 reflex     Anti Nuclear Melia IgG by IFA with Reflex     Rheumatoid factor     CRP, inflammation     ESR: Erythrocyte sedimentation rate   5. Other long term (current) drug therapy  Z79.899 Hemoglobin A1c   6. Vitamin D deficiency  E55.9 Vitamin D Deficiency   7. Encounter for screening mammogram for malignant neoplasm of breast Z12.31 MA Screen Bilateral w/Marc   8. Encounter for lipid screening for cardiovascular disease Z13.220 Lipid panel reflex to direct LDL Non-fasting    Z13.6 Hemoglobin A1c     Vitamin D Deficiency     Complicated 74 yo female with a history of seronegative RA s/p prednisone and multiple DMARDs, self-weaned off of all these medications, who was later (recently) diagnosed with ILD (etiology still unclear) in 2016 s/p rituximab, no off of this.  She is only on albuterol.      She presents with generalized fatigue in presence of macular rash (mild) and no other B-type symptoms.  Differential is very broad based on her co-morbidities and non-specific symptom, including flair-up of autoimmune/inflammatory process (since she is off of all immunosuppressive therapy), other systemic illness including thyroid, metabolic, hematologic, nutritional abnormality.  She is also overdue for her yearly check-up.  I performed a full physical and will send full lab work-up for her fatigue (exam is unremarkable other than oxygen dependence and rash).  Will also update all her age-appropriate screening tests as well to ensure fatigue is not secondary to cancer (though less likely).      Will contact patient with results and next steps.    End of Life Planning:  Patient currently has an advanced directive: Yes.   "Practitioner is supportive of decision.    COUNSELING:  Reviewed preventive health counseling, as reflected in patient instructions    BP Readings from Last 1 Encounters:   03/28/19 120/64     Estimated body mass index is 29.67 kg/m  as calculated from the following:    Height as of 3/13/19: 1.499 m (4' 11.02\").    Weight as of this encounter: 66.7 kg (147 lb).           reports that  has never smoked. she has never used smokeless tobacco.      Appropriate preventive services were discussed with this patient, including applicable screening as appropriate for cardiovascular disease, diabetes, osteopenia/osteoporosis, and glaucoma.  As appropriate for age/gender, discussed screening for colorectal cancer, prostate cancer, breast cancer, and cervical cancer. Checklist reviewing preventive services available has been given to the patient.    Reviewed patients plan of care and provided an AVS. The Intermediate Care Plan ( asthma action plan, low back pain action plan, and migraine action plan) for Wanda meets the Care Plan requirement. This Care Plan has been established and reviewed with the Patient.    Counseling Resources:  ATP IV Guidelines  Pooled Cohorts Equation Calculator  Breast Cancer Risk Calculator  FRAX Risk Assessment  ICSI Preventive Guidelines  Dietary Guidelines for Americans, 2010  [employer]'s MyPlate  ASA Prophylaxis  Lung CA Screening    Duglas Orr MD  [facility], [place]  "

## 2019-03-28 NOTE — PATIENT INSTRUCTIONS
Today I am initiating a work-up for fatigue - I will contact you with further steps when labs are available.  Head to pharmacy for tetanus and shingles vaccine.    Preventive Health Recommendations    See your health care provider every year to    Review health changes.     Discuss preventive care.      Review your medicines if your doctor has prescribed any.      You no longer need a yearly Pap test unless you've had an abnormal Pap test in the past 10 years. If you have vaginal symptoms, such as bleeding or discharge, be sure to talk with your provider about a Pap test.      Every 1 to 2 years, have a mammogram.  If you are over 69, talk with your health care provider about whether or not you want to continue having screening mammograms.      Every 10 years, have a colonoscopy. Or, have a yearly FIT test (stool test). These exams will check for colon cancer.       Have a cholesterol test every 5 years, or more often if your doctor advises it.       Have a diabetes test (fasting glucose) every three years. If you are at risk for diabetes, you should have this test more often.       At age 65, have a bone density scan (DEXA) to check for osteoporosis (brittle bone disease).    Shots:    Get a flu shot each year.    Get a tetanus shot every 10 years.    Talk to your doctor about your pneumonia vaccines. There are now two you should receive - Pneumovax (PPSV 23) and Prevnar (PCV 13).    Talk to your pharmacist about the shingles vaccine.    Talk to your doctor about the hepatitis B vaccine.    Nutrition:     Eat at least 5 servings of fruits and vegetables each day.      Eat whole-grain bread, whole-wheat pasta and brown rice instead of white grains and rice.      Get adequate Calcium and Vitamin D.     Lifestyle    Exercise at least 150 minutes a week (30 minutes a day, 5 days a week). This will help you control your weight and prevent disease.      Limit alcohol to one drink per day.      No smoking.       Wear  sunscreen to prevent skin cancer.       See your dentist twice a year for an exam and cleaning.      See your eye doctor every 1 to 2 years to screen for conditions such as glaucoma, macular degeneration and cataracts.    Personalized Prevention Plan  You are due for the preventive services outlined below.  Your care team is available to assist you in scheduling these services.  If you have already completed any of these items, please share that information with your care team to update in your medical record.  Health Maintenance Due   Topic Date Due     Zoster (Shingles) Vaccine (1 of 2) 10/17/1995     Annual Wellness Visit  01/29/2015     Mammogram - yearly  04/25/2017     Diptheria Tetanus Pertussis (DTAP/TDAP/TD) Vaccine (2 - Td) 01/08/2018     Flu Vaccine (1) 09/01/2018     Asthma Control Test - every 6 months  10/20/2018     Asthma Action Plan - yearly  04/20/2019

## 2019-03-28 NOTE — LETTER
My Asthma Action Plan  Name: Wanda Kaur   YOB: 1945  Date: 3/28/2019   My doctor: Duglas Orr MD   My clinic: [facility]        My Control Medicine: Fluticasone + salmeterol (Advair) -  Diskus 250/50 mcg    My Rescue Medicine: Albuterol (Proair/Ventolin/Proventil) inhaler     My Asthma Severity: intermittent  Avoid your asthma triggers:                 GREEN ZONE   Good Control    I feel good    No cough or wheeze    Can work, sleep and play without asthma symptoms       Take your asthma control medicine every day.     1. If exercise triggers your asthma, take your rescue medication    15 minutes before exercise or sports, and    During exercise if you have asthma symptoms  2. Spacer to use with inhaler: If you have a spacer, make sure to use it with your inhaler             YELLOW ZONE Getting Worse  I have ANY of these:    I do not feel good    Cough or wheeze    Chest feels tight    Wake up at night   1. Keep taking your Green Zone medications  2. Start taking your rescue medicine:    every 20 minutes for up to 1 hour. Then every 4 hours for 24-48 hours.  3. If you stay in the Yellow Zone for more than 12-24 hours, contact your doctor.  4. If you do not return to the Green Zone in 12-24 hours or you get worse, start taking your oral steroid medicine if prescribed by your provider.           RED ZONE Medical Alert - Get Help  I have ANY of these:    I feel awful    Medicine is not helping    Breathing getting harder    Trouble walking or talking    Nose opens wide to breathe       1. Take your rescue medicine NOW  2. If your provider has prescribed an oral steroid medicine, start taking it NOW  3. Call your doctor NOW  4. If you are still in the Red Zone after 20 minutes and you have not reached your doctor:    Take your rescue medicine again and    Call 911 or go to the emergency room right away    See your regular doctor within 2 weeks of an Emergency Room or Urgent  Care visit for follow-up treatment.          Annual Reminders:  Meet with Asthma Educator,  Flu Shot in the Fall, consider Pneumonia Vaccination for patients with asthma (aged 19 and older).    Pharmacy:    [facility] [address] [place] [address] [address] [address] [address] AT [facility] [address]  [facility] [place] [facility] [place] [place] [facility] [address]                      Asthma Triggers  How To Control Things That Make Your Asthma Worse    Triggers are things that make your asthma worse.  Look at the list below to help you find your triggers and what you can do about them.  You can help prevent asthma flare-ups by staying away from your triggers.      Trigger                                                          What you can do   Cigarette Smoke  Tobacco smoke can make asthma worse. Do not allow smoking in your home, car or around you.  Be sure no one smokes at a child s day care or school.  If you smoke, ask your health care provider for ways to help you quit.  Ask family members to quit too.  Ask your health care provider for a referral to Quit Plan to help you quit smoking, or call 0-486-447-PLAN.     Colds, Flu, Bronchitis  These are common triggers of asthma. Wash your hands often.  Don t touch your eyes, nose or mouth.  Get a flu shot every year.     Dust Mites  These are tiny bugs that live in cloth or carpet. They are too small to see. Wash sheets and blankets in hot water every week.   Encase pillows and mattress in dust mite proof covers.  Avoid having carpet if you can. If you have carpet, vacuum weekly.   Use a dust mask and HEPA vacuum.   Pollen and Outdoor Mold  Some people are allergic to trees, grass, or weed pollen, or molds. Try to keep your windows closed.  Limit time out doors when pollen count is high.   Ask you health care provider about taking medicine during allergy season.     Animal Dander  Some people are allergic to skin flakes, urine or saliva from pets with fur or feathers.  Keep pets with fur or feathers out of your home.    If you can t keep the pet outdoors, then keep the pet out of your bedroom.  Keep the bedroom door closed.  Keep pets off cloth furniture and away from stuffed toys.     Mice, Rats, and Cockroaches  Some people are allergic to the waste from these pests.   Cover food and garbage.  Clean up spills and food crumbs.  Store grease in the refrigerator.   Keep food out of the bedroom.   Indoor Mold  This can be a trigger if your home has high moisture. Fix leaking faucets, pipes, or other sources of water.   Clean moldy surfaces.  Dehumidify basement if it is damp and smelly.   Smoke, Strong Odors, and Sprays  These can reduce air quality. Stay away from strong odors and sprays, such as perfume, powder, hair spray, paints, smoke incense, paint, cleaning products, candles and new carpet.   Exercise or Sports  Some people with asthma have this trigger. Be active!  Ask your doctor about taking medicine before sports or exercise to prevent symptoms.    Warm up for 5-10 minutes before and after sports or exercise.     Other Triggers of Asthma  Cold air:  Cover your nose and mouth with a scarf.  Sometimes laughing or crying can be a trigger.  Some medicines and food can trigger asthma.

## 2019-03-29 ENCOUNTER — ANCILLARY PROCEDURE (OUTPATIENT)
Dept: MAMMOGRAPHY | Facility: CLINIC | Age: 74
End: 2019-03-29
Payer: COMMERCIAL

## 2019-03-29 LAB
ALBUMIN SERPL-MCNC: 4.1 G/DL (ref 3.4–5)
ALP SERPL-CCNC: 89 U/L (ref 40–150)
ALT SERPL W P-5'-P-CCNC: 28 U/L (ref 0–50)
ANA PAT SER IF-IMP: ABNORMAL
ANA SER QL IF: POSITIVE
ANA TITR SER IF: ABNORMAL {TITER}
ANION GAP SERPL CALCULATED.3IONS-SCNC: 10 MMOL/L (ref 3–14)
AST SERPL W P-5'-P-CCNC: 20 U/L (ref 0–45)
BILIRUB SERPL-MCNC: 0.4 MG/DL (ref 0.2–1.3)
BUN SERPL-MCNC: 13 MG/DL (ref 7–30)
CALCIUM SERPL-MCNC: 9.4 MG/DL (ref 8.5–10.1)
CHLORIDE SERPL-SCNC: 108 MMOL/L (ref 94–109)
CHOLEST SERPL-MCNC: 246 MG/DL
CO2 SERPL-SCNC: 25 MMOL/L (ref 20–32)
CREAT SERPL-MCNC: 0.85 MG/DL (ref 0.52–1.04)
DEPRECATED CALCIDIOL+CALCIFEROL SERPL-MC: 53 UG/L (ref 20–75)
GFR SERPL CREATININE-BSD FRML MDRD: 68 ML/MIN/{1.73_M2}
GLUCOSE SERPL-MCNC: 97 MG/DL (ref 70–99)
HDLC SERPL-MCNC: 45 MG/DL
LDLC SERPL CALC-MCNC: 139 MG/DL
NONHDLC SERPL-MCNC: 201 MG/DL
POTASSIUM SERPL-SCNC: 4.3 MMOL/L (ref 3.4–5.3)
PROT SERPL-MCNC: 7 G/DL (ref 6.8–8.8)
RHEUMATOID FACT SER NEPH-ACNC: <20 IU/ML (ref 0–20)
SODIUM SERPL-SCNC: 143 MMOL/L (ref 133–144)
TRIGL SERPL-MCNC: 312 MG/DL
TSH SERPL DL<=0.005 MIU/L-ACNC: 3.01 MU/L (ref 0.4–4)

## 2019-03-29 PROCEDURE — 77067 SCR MAMMO BI INCL CAD: CPT | Mod: TC

## 2019-04-02 ENCOUNTER — TELEPHONE (OUTPATIENT)
Dept: PEDIATRICS | Facility: CLINIC | Age: 74
End: 2019-04-02

## 2019-04-02 DIAGNOSIS — R53.83 FATIGUE, UNSPECIFIED TYPE: ICD-10-CM

## 2019-04-02 DIAGNOSIS — J84.9 ILD (INTERSTITIAL LUNG DISEASE) (H): ICD-10-CM

## 2019-04-02 LAB
BASOPHILS # BLD AUTO: 0 10E9/L (ref 0–0.2)
BASOPHILS NFR BLD AUTO: 0.4 %
DIFFERENTIAL METHOD BLD: NORMAL
EOSINOPHIL # BLD AUTO: 0.2 10E9/L (ref 0–0.7)
EOSINOPHIL NFR BLD AUTO: 2.4 %
ERYTHROCYTE [DISTWIDTH] IN BLOOD BY AUTOMATED COUNT: 12.5 % (ref 10–15)
HCT VFR BLD AUTO: 37.3 % (ref 35–47)
HGB BLD-MCNC: 12.1 G/DL (ref 11.7–15.7)
LYMPHOCYTES # BLD AUTO: 0.8 10E9/L (ref 0.8–5.3)
LYMPHOCYTES NFR BLD AUTO: 11.2 %
MCH RBC QN AUTO: 31.7 PG (ref 26.5–33)
MCHC RBC AUTO-ENTMCNC: 32.4 G/DL (ref 31.5–36.5)
MCV RBC AUTO: 98 FL (ref 78–100)
MONOCYTES # BLD AUTO: 0.5 10E9/L (ref 0–1.3)
MONOCYTES NFR BLD AUTO: 7.2 %
NEUTROPHILS # BLD AUTO: 5.8 10E9/L (ref 1.6–8.3)
NEUTROPHILS NFR BLD AUTO: 78.8 %
PLATELET # BLD AUTO: 246 10E9/L (ref 150–450)
RBC # BLD AUTO: 3.82 10E12/L (ref 3.8–5.2)
WBC # BLD AUTO: 7.4 10E9/L (ref 4–11)

## 2019-04-02 PROCEDURE — 36415 COLL VENOUS BLD VENIPUNCTURE: CPT | Performed by: INTERNAL MEDICINE

## 2019-04-02 PROCEDURE — 85025 COMPLETE CBC W/AUTO DIFF WBC: CPT | Performed by: INTERNAL MEDICINE

## 2019-04-02 NOTE — TELEPHONE ENCOUNTER
Patient dropped off a form from [employer] that the Albuterol inhaler use exceeds the plan limits.      Provider will need to request an exception on how this medication is covered.  Patient uses the Albuterol inhaler every 3 hours, which is 240 puffs per month.  One inhaler is allowed per month, and this is 200 puffs.  Med was ordered by Dr Perkins.  Forms placed at [employer] desk.  Please begin the process.  THanks.  ERROL Villafuerte RN

## 2019-04-05 RX ORDER — ALBUTEROL SULFATE 90 UG/1
2 AEROSOL, METERED RESPIRATORY (INHALATION)
Qty: 36 G | Refills: 11 | Status: SHIPPED | OUTPATIENT
Start: 2019-04-05 | End: 2020-05-21

## 2019-04-05 NOTE — TELEPHONE ENCOUNTER
I updated the Prescription for how the patient is using. We should run again through her pharmacy to see if covered. Please YUMIKO pt that this was sent to pharmacy. She should let us know if this does not work.

## 2019-04-05 NOTE — TELEPHONE ENCOUNTER
Pt calling back, being upset that she received a call from her pharmacy that the rx for albuterol inhaler is ready to pick-up. Pt is upset that she just picked up a rx not needed till the end of the month.    Explained to her that the provider changed the sig to cover her albuterol to last for the whole month(changed sig from 200 puffs per month to 240 puffs per month). With the new sig we sent a new rx for future use. Pharmacies will have automated system in place to contact pt whenever they receive a new rx from provider. She doesn't have to pick-up the rx now if she doesn't need it. Advised her to ask the pharmacy to keep the rx in file for future. Pt agrees.    Matti, RN  Triage Nurse

## 2019-08-19 ENCOUNTER — HOSPITAL ENCOUNTER (OUTPATIENT)
Dept: CT IMAGING | Facility: CLINIC | Age: 74
Discharge: HOME | End: 2019-08-19
Attending: PHYSICIAN ASSISTANT | Admitting: PHYSICIAN ASSISTANT
Payer: COMMERCIAL

## 2019-08-19 ENCOUNTER — OFFICE VISIT (OUTPATIENT)
Dept: PEDIATRICS | Facility: CLINIC | Age: 74
End: 2019-08-19
Payer: COMMERCIAL

## 2019-08-19 ENCOUNTER — TELEPHONE (OUTPATIENT)
Dept: PEDIATRICS | Facility: CLINIC | Age: 74
End: 2019-08-19

## 2019-08-19 VITALS
BODY MASS INDEX: 28.14 KG/M2 | DIASTOLIC BLOOD PRESSURE: 60 MMHG | SYSTOLIC BLOOD PRESSURE: 110 MMHG | HEART RATE: 97 BPM | OXYGEN SATURATION: 96 % | TEMPERATURE: 98.9 F | WEIGHT: 139.6 LBS | HEIGHT: 59 IN

## 2019-08-19 DIAGNOSIS — R10.32 LLQ ABDOMINAL PAIN: ICD-10-CM

## 2019-08-19 DIAGNOSIS — R82.90 ABNORMAL FINDING IN URINE: ICD-10-CM

## 2019-08-19 DIAGNOSIS — K57.32 DIVERTICULITIS OF COLON: Primary | ICD-10-CM

## 2019-08-19 DIAGNOSIS — R10.32 LLQ ABDOMINAL PAIN: Primary | ICD-10-CM

## 2019-08-19 LAB
ALBUMIN SERPL-MCNC: 3.8 G/DL (ref 3.4–5)
ALBUMIN UR-MCNC: NEGATIVE MG/DL
ALP SERPL-CCNC: 72 U/L (ref 40–150)
ALT SERPL W P-5'-P-CCNC: 25 U/L (ref 0–50)
ANION GAP SERPL CALCULATED.3IONS-SCNC: 11 MMOL/L (ref 3–14)
APPEARANCE UR: CLEAR
AST SERPL W P-5'-P-CCNC: 28 U/L (ref 0–45)
BACTERIA #/AREA URNS HPF: ABNORMAL /HPF
BASOPHILS # BLD AUTO: 0 10E9/L (ref 0–0.2)
BASOPHILS NFR BLD AUTO: 0.2 %
BILIRUB SERPL-MCNC: 0.6 MG/DL (ref 0.2–1.3)
BILIRUB UR QL STRIP: ABNORMAL
BUN SERPL-MCNC: 11 MG/DL (ref 7–30)
CALCIUM SERPL-MCNC: 9.3 MG/DL (ref 8.5–10.1)
CHLORIDE SERPL-SCNC: 105 MMOL/L (ref 94–109)
CO2 SERPL-SCNC: 29 MMOL/L (ref 20–32)
COLOR UR AUTO: YELLOW
CREAT SERPL-MCNC: 1.1 MG/DL (ref 0.52–1.04)
CRP SERPL-MCNC: 64 MG/L (ref 0–8)
DIFFERENTIAL METHOD BLD: ABNORMAL
EOSINOPHIL # BLD AUTO: 0.1 10E9/L (ref 0–0.7)
EOSINOPHIL NFR BLD AUTO: 1.7 %
ERYTHROCYTE [DISTWIDTH] IN BLOOD BY AUTOMATED COUNT: 12.3 % (ref 10–15)
ERYTHROCYTE [SEDIMENTATION RATE] IN BLOOD BY WESTERGREN METHOD: 53 MM/H (ref 0–30)
GFR SERPL CREATININE-BSD FRML MDRD: 49 ML/MIN/{1.73_M2}
GLUCOSE SERPL-MCNC: 102 MG/DL (ref 70–99)
GLUCOSE UR STRIP-MCNC: NEGATIVE MG/DL
HCT VFR BLD AUTO: 39.2 % (ref 35–47)
HGB BLD-MCNC: 12.5 G/DL (ref 11.7–15.7)
HGB UR QL STRIP: ABNORMAL
KETONES UR STRIP-MCNC: NEGATIVE MG/DL
LEUKOCYTE ESTERASE UR QL STRIP: NEGATIVE
LYMPHOCYTES # BLD AUTO: 0.7 10E9/L (ref 0.8–5.3)
LYMPHOCYTES NFR BLD AUTO: 8.1 %
MCH RBC QN AUTO: 31.3 PG (ref 26.5–33)
MCHC RBC AUTO-ENTMCNC: 31.9 G/DL (ref 31.5–36.5)
MCV RBC AUTO: 98 FL (ref 78–100)
MONOCYTES # BLD AUTO: 0.4 10E9/L (ref 0–1.3)
MONOCYTES NFR BLD AUTO: 5.1 %
MUCOUS THREADS #/AREA URNS LPF: PRESENT /LPF
NEUTROPHILS # BLD AUTO: 7.1 10E9/L (ref 1.6–8.3)
NEUTROPHILS NFR BLD AUTO: 84.9 %
NITRATE UR QL: NEGATIVE
NON-SQ EPI CELLS #/AREA URNS LPF: ABNORMAL /LPF
PH UR STRIP: 6 PH (ref 5–7)
PLATELET # BLD AUTO: 250 10E9/L (ref 150–450)
POTASSIUM SERPL-SCNC: 4.5 MMOL/L (ref 3.4–5.3)
PROT SERPL-MCNC: 7.5 G/DL (ref 6.8–8.8)
RBC # BLD AUTO: 4 10E12/L (ref 3.8–5.2)
RBC #/AREA URNS AUTO: ABNORMAL /HPF
SODIUM SERPL-SCNC: 145 MMOL/L (ref 133–144)
SOURCE: ABNORMAL
SP GR UR STRIP: 1.02 (ref 1–1.03)
UROBILINOGEN UR STRIP-ACNC: 1 EU/DL (ref 0.2–1)
WBC # BLD AUTO: 8.3 10E9/L (ref 4–11)
WBC #/AREA URNS AUTO: ABNORMAL /HPF

## 2019-08-19 PROCEDURE — 99214 OFFICE O/P EST MOD 30 MIN: CPT | Performed by: PHYSICIAN ASSISTANT

## 2019-08-19 PROCEDURE — 85025 COMPLETE CBC W/AUTO DIFF WBC: CPT | Performed by: PHYSICIAN ASSISTANT

## 2019-08-19 PROCEDURE — 87086 URINE CULTURE/COLONY COUNT: CPT | Performed by: PHYSICIAN ASSISTANT

## 2019-08-19 PROCEDURE — 25000125 ZZHC RX 250: Performed by: PHYSICIAN ASSISTANT

## 2019-08-19 PROCEDURE — 86140 C-REACTIVE PROTEIN: CPT | Performed by: PHYSICIAN ASSISTANT

## 2019-08-19 PROCEDURE — 85652 RBC SED RATE AUTOMATED: CPT | Performed by: PHYSICIAN ASSISTANT

## 2019-08-19 PROCEDURE — 36415 COLL VENOUS BLD VENIPUNCTURE: CPT | Performed by: PHYSICIAN ASSISTANT

## 2019-08-19 PROCEDURE — 81001 URINALYSIS AUTO W/SCOPE: CPT | Performed by: PHYSICIAN ASSISTANT

## 2019-08-19 PROCEDURE — 80053 COMPREHEN METABOLIC PANEL: CPT | Performed by: PHYSICIAN ASSISTANT

## 2019-08-19 PROCEDURE — 74177 CT ABD & PELVIS W/CONTRAST: CPT

## 2019-08-19 PROCEDURE — 25000128 H RX IP 250 OP 636: Performed by: PHYSICIAN ASSISTANT

## 2019-08-19 RX ORDER — IOPAMIDOL 755 MG/ML
68 INJECTION, SOLUTION INTRAVASCULAR ONCE
Status: COMPLETED | OUTPATIENT
Start: 2019-08-19 | End: 2019-08-19

## 2019-08-19 RX ADMIN — SODIUM CHLORIDE 59 ML: 9 INJECTION, SOLUTION INTRAVENOUS at 14:52

## 2019-08-19 RX ADMIN — IOPAMIDOL 68 ML: 755 INJECTION, SOLUTION INTRAVENOUS at 14:51

## 2019-08-19 ASSESSMENT — MIFFLIN-ST. JEOR: SCORE: 1044.16

## 2019-08-19 NOTE — PROGRESS NOTES
"Subjective     Wanda Kaur is a 73 year old female who presents to clinic today for the following health issues:    HPI   ABDOMINAL PAIN     Onset: ***    Description:   Character: {.:776558}  Location: {.:483876}  Radiation: {.:434703::\"None\"}    Intensity: {.:413127}    Progression of Symptoms:  {.:904853}    Accompanying Signs & Symptoms:  Fever/Chills?: { :436958}  Gas/Bloating: { :228671}  Nausea: { :161709}  Vomitting: { :421553}  Diarrhea?: { :041172}  Constipation:{ :120360}  Dysuria or Hematuria: { :918701}   History:   Trauma: { :639656}  Previous similar pain: { :772409}   Previous tests done: { .:512860}    Precipitating factors:   Does the pain change with:     Food: { :562090}     BM: { :311644}    Urination: { :703476}    Alleviating factors:  ***    Therapies Tried and outcome: ***    LMP:  {NOT applicable:707669::\"not applicable\"}     {additonal problems for provider to add (Optional):487945}    {HIST REVIEW/ LINKS 2 (Optional):662644}    {Additional problems for the provider to add (optional):845027}  Reviewed and updated as needed this visit by Provider         Review of Systems   {ROS COMP (Optional):891940}      Objective    LMP  (LMP Unknown)   There is no height or weight on file to calculate BMI.  Physical Exam   {Exam List (Optional):076924}    {Diagnostic Test Results (Optional):632275::\"Diagnostic Test Results:\",\"Labs reviewed in Epic\"}        {PROVIDER CHARTING PREFERENCE:939349}      "

## 2019-08-19 NOTE — TELEPHONE ENCOUNTER
CT reveals diverticulitis. Patient will begin antibiotics and push fluids.   Close follow up in 10 days.   Discussed with patient.    Moe Triplett PA-C

## 2019-08-19 NOTE — PROGRESS NOTES
"Subjective     Wanda Kaur is a 73 year old female who presents to clinic today for the following health issues:    HPI   ABDOMINAL and FLANK PAIN     Onset: three days and worsened    Description:   Character: Sharp and deep   Location: left lower quadrant right flank left flank  Radiation: None    Intensity: 3/10 currently; 9-10/10 at worst     Progression of Symptoms:  same and intermittent    Accompanying Signs & Symptoms:  Fever/Chills?: YES- Chills  Gas/Bloating: YES  Nausea: YES  Vomitting: no   Diarrhea?: no   Constipation:YES  Dysuria or Hematuria: no    History:   Trauma: no   Previous similar pain: no    Previous tests done: none    Precipitating factors:   Does the pain change with:     Food: no- loss of appetite     BM: YES- better    Urination: no     Alleviating factors:  None    Therapies Tried and outcome: Ibuprofen- some relief      LMP:  not applicable   No history of diverticulitis. No history of kidney stones. No appendicitis. No bowel obstructions, ulcers.   No IBS, IBD. No history of colon cancer. Colonoscopy completed.  S/p pramod. S/p total hysterectomy and oophrectomy bilaterally.  No vaginal bleeding or discharge.     Review of Systems   ROS COMP: Constitutional, HEENT, cardiovascular, pulmonary, gi and gu systems are negative, except as otherwise noted.      Objective    /60 (BP Location: Right arm, Patient Position: Sitting, Cuff Size: Adult Regular)   Pulse 97   Temp 98.9  F (37.2  C) (Tympanic)   Ht 1.499 m (4' 11.02\")   Wt 63.3 kg (139 lb 9.6 oz)   LMP  (LMP Unknown)   SpO2 96%   BMI 28.18 kg/m    Body mass index is 28.18 kg/m .  Physical Exam   GENERAL: alert, no distress  Patient on home oxygen  EYES: Eyes grossly normal to inspection, PERRL and conjunctivae and sclerae normal  HENT: mouth without ulcers or lesions  NECK: no adenopathy  RESP: lungs clear to auscultation - no rales, rhonchi or wheezes  CV: regular rate and rhythm, normal S1 S2, no S3 or S4, no " murmur  ABDOMEN: soft, LLQ tenderness present; BS in all quadrants present; no HSM  BACK: no CVA tenderness    Diagnostic Test Results:  Results for orders placed or performed in visit on 08/19/19 (from the past 24 hour(s))   Comprehensive metabolic panel   Result Value Ref Range    Sodium 145 (H) 133 - 144 mmol/L    Potassium 4.5 3.4 - 5.3 mmol/L    Chloride 105 94 - 109 mmol/L    Carbon Dioxide 29 20 - 32 mmol/L    Anion Gap 11 3 - 14 mmol/L    Glucose 102 (H) 70 - 99 mg/dL    Urea Nitrogen 11 7 - 30 mg/dL    Creatinine 1.10 (H) 0.52 - 1.04 mg/dL    GFR Estimate 49 (L) >60 mL/min/[1.73_m2]    GFR Estimate If Black 56 (L) >60 mL/min/[1.73_m2]    Calcium 9.3 8.5 - 10.1 mg/dL    Bilirubin Total 0.6 0.2 - 1.3 mg/dL    Albumin 3.8 3.4 - 5.0 g/dL    Protein Total 7.5 6.8 - 8.8 g/dL    Alkaline Phosphatase 72 40 - 150 U/L    ALT 25 0 - 50 U/L    AST 28 0 - 45 U/L   CBC with platelets differential   Result Value Ref Range    WBC 8.3 4.0 - 11.0 10e9/L    RBC Count 4.00 3.8 - 5.2 10e12/L    Hemoglobin 12.5 11.7 - 15.7 g/dL    Hematocrit 39.2 35.0 - 47.0 %    MCV 98 78 - 100 fl    MCH 31.3 26.5 - 33.0 pg    MCHC 31.9 31.5 - 36.5 g/dL    RDW 12.3 10.0 - 15.0 %    Platelet Count 250 150 - 450 10e9/L    % Neutrophils 84.9 %    % Lymphocytes 8.1 %    % Monocytes 5.1 %    % Eosinophils 1.7 %    % Basophils 0.2 %    Absolute Neutrophil 7.1 1.6 - 8.3 10e9/L    Absolute Lymphocytes 0.7 (L) 0.8 - 5.3 10e9/L    Absolute Monocytes 0.4 0.0 - 1.3 10e9/L    Absolute Eosinophils 0.1 0.0 - 0.7 10e9/L    Absolute Basophils 0.0 0.0 - 0.2 10e9/L    Diff Method Automated Method    Erythrocyte sedimentation rate auto   Result Value Ref Range    Sed Rate 53 (H) 0 - 30 mm/h   *UA reflex to Microscopic   Result Value Ref Range    Color Urine Yellow     Appearance Urine Clear     Glucose Urine Negative NEG^Negative mg/dL    Bilirubin Urine Small (A) NEG^Negative    Ketones Urine Negative NEG^Negative mg/dL    Specific Gravity Urine 1.025 1.003 -  1.035    Blood Urine Moderate (A) NEG^Negative    pH Urine 6.0 5.0 - 7.0 pH    Protein Albumin Urine Negative NEG^Negative mg/dL    Urobilinogen Urine 1.0 0.2 - 1.0 EU/dL    Nitrite Urine Negative NEG^Negative    Leukocyte Esterase Urine Negative NEG^Negative    Source Midstream Urine    Urine Microscopic   Result Value Ref Range    WBC Urine 0 - 5 OTO5^0 - 5 /HPF    RBC Urine 10-25 (A) OTO2^O - 2 /HPF    Squamous Epithelial /LPF Urine Few FEW^Few /LPF    Bacteria Urine Few (A) NEG^Negative /HPF    Mucous Urine Present (A) NEG^Negative /LPF           Assessment & Plan   1. LLQ abdominal pain  Proceed with labs and imaging given severity of pain and accompanying symptoms.   - CT Abdomen Pelvis w Contrast; Future  - Comprehensive metabolic panel  - CBC with platelets differential  - Erythrocyte sedimentation rate auto  - *UA reflex to Microscopic  - CRP inflammation  - Urine Microscopic    2. Abnormal finding in urine  - Urine Culture Aerobic Bacterial     Moe Triplett PA-C  [facility]

## 2019-08-20 LAB
BACTERIA SPEC CULT: NORMAL
SPECIMEN SOURCE: NORMAL

## 2019-08-21 ENCOUNTER — TELEPHONE (OUTPATIENT)
Dept: PULMONOLOGY | Facility: CLINIC | Age: 74
End: 2019-08-21

## 2019-08-21 NOTE — TELEPHONE ENCOUNTER
Health Call Center    Phone Message    May a detailed message be left on voicemail: yes    Reason for Call: Other: Gregorio called and noted that per the Pt's insurance company ([employer]), they want a more current chest Xray prior to having/approving the Chest CT scan. Pt is scheduled to have the CT done at [facility] on 9/9/2019. Please follow up with this request with [facility] or [employer] ([phone], case number: [id number]).     Action Taken: Message routed to:  [facility] ([facility]): Pulm

## 2019-08-28 ENCOUNTER — OFFICE VISIT (OUTPATIENT)
Dept: PEDIATRICS | Facility: CLINIC | Age: 74
End: 2019-08-28
Payer: COMMERCIAL

## 2019-08-28 VITALS
BODY MASS INDEX: 28.24 KG/M2 | SYSTOLIC BLOOD PRESSURE: 128 MMHG | HEART RATE: 87 BPM | HEIGHT: 59 IN | TEMPERATURE: 97.5 F | WEIGHT: 140.1 LBS | OXYGEN SATURATION: 98 % | DIASTOLIC BLOOD PRESSURE: 58 MMHG

## 2019-08-28 DIAGNOSIS — K57.32 DIVERTICULITIS OF COLON: Primary | ICD-10-CM

## 2019-08-28 PROCEDURE — 99213 OFFICE O/P EST LOW 20 MIN: CPT | Mod: GE | Performed by: STUDENT IN AN ORGANIZED HEALTH CARE EDUCATION/TRAINING PROGRAM

## 2019-08-28 ASSESSMENT — MIFFLIN-ST. JEOR: SCORE: 1046.43

## 2019-08-28 NOTE — PATIENT INSTRUCTIONS
1) did discuss colonoscopy follow up in 6-8 weeks,OK to not continue if you do not want one  2) continue augmentin; finish course

## 2019-09-04 ENCOUNTER — TELEPHONE (OUTPATIENT)
Dept: PEDIATRICS | Facility: CLINIC | Age: 74
End: 2019-09-04

## 2019-09-04 NOTE — TELEPHONE ENCOUNTER
Yes, I'm happy to take her back on as a patient once Dr Delatorre leaves.      Love Ng MD  Internal Medicine - Pediatrics

## 2019-09-04 NOTE — TELEPHONE ENCOUNTER
Spoke with Pt, She is happy that Dr Ng will see her again once Dr Delatorre leaves. Please feel free to schedule pt with Dr Ng in the future.     Mary Anne Granados,     on 9/4/2019 at 1:37 PM

## 2019-09-04 NOTE — TELEPHONE ENCOUNTER
Reason for call:  Other   Patient called regarding (reason for call): call back  Additional comments: patient would like to know if Dr Ng will take her back at her patient since Dr Delatorre is leaving. Please advise.     Phone number to reach patient:  Home number on file [phone] (home)    Best Time:  any    Can we leave a detailed message on this number?  YES

## 2019-09-17 DIAGNOSIS — J84.9 ILD (INTERSTITIAL LUNG DISEASE) (H): Primary | ICD-10-CM

## 2019-09-17 ASSESSMENT — ENCOUNTER SYMPTOMS
VOMITING: 0
JOINT SWELLING: 0
NAUSEA: 1
NECK PAIN: 0
SNORES LOUDLY: 0
MYALGIAS: 1
DYSPNEA ON EXERTION: 1
POSTURAL DYSPNEA: 0
HEARTBURN: 1
JAUNDICE: 0
BACK PAIN: 1
STIFFNESS: 1
ARTHRALGIAS: 0
SPUTUM PRODUCTION: 1
CONSTIPATION: 0
BLOOD IN STOOL: 0
SHORTNESS OF BREATH: 1
MUSCLE CRAMPS: 1
HEMOPTYSIS: 0
WHEEZING: 0
BOWEL INCONTINENCE: 1
MUSCLE WEAKNESS: 0
COUGH DISTURBING SLEEP: 0
RECTAL PAIN: 0
DIARRHEA: 1
BLOATING: 1
COUGH: 1
ABDOMINAL PAIN: 1

## 2019-09-18 ENCOUNTER — OFFICE VISIT (OUTPATIENT)
Dept: PULMONOLOGY | Facility: CLINIC | Age: 74
End: 2019-09-18
Attending: INTERNAL MEDICINE
Payer: COMMERCIAL

## 2019-09-18 VITALS
BODY MASS INDEX: 29.64 KG/M2 | OXYGEN SATURATION: 93 % | HEART RATE: 106 BPM | RESPIRATION RATE: 17 BRPM | WEIGHT: 147 LBS | HEIGHT: 59 IN | SYSTOLIC BLOOD PRESSURE: 134 MMHG | DIASTOLIC BLOOD PRESSURE: 71 MMHG

## 2019-09-18 DIAGNOSIS — J84.9 ILD (INTERSTITIAL LUNG DISEASE) (H): Primary | ICD-10-CM

## 2019-09-18 DIAGNOSIS — J84.9 ILD (INTERSTITIAL LUNG DISEASE) (H): ICD-10-CM

## 2019-09-18 LAB
6 MIN WALK (FT): 500 FT
6 MIN WALK (M): 152 M

## 2019-09-18 PROCEDURE — 94729 DIFFUSING CAPACITY: CPT | Mod: XU | Performed by: INTERNAL MEDICINE

## 2019-09-18 PROCEDURE — G0463 HOSPITAL OUTPT CLINIC VISIT: HCPCS | Mod: 25

## 2019-09-18 ASSESSMENT — MIFFLIN-ST. JEOR: SCORE: 1077.42

## 2019-09-18 ASSESSMENT — PAIN SCALES - GENERAL: PAINLEVEL: NO PAIN (0)

## 2019-09-18 NOTE — PROGRESS NOTES
HISTORY OF PRESENT ILLNESS:  The patient is a 73-year-old female with interstitial lung disease of unclear etiology, thought to be an ILD related to seronegative rheumatoid arthritis less likely IPF.  In 2016, she had an exacerbation of ILD possibly related to pneumonia and was hospitalized for a prolonged period of time.  In the aftermath of that she was placed on CellCept and prednisone and felt terrible while being on these medications and self-tapered herself off of them.  I began following her in 2018.  Her last visit with me was in 03/2019.  Overall, her breathing is about the same.  She thinks her cough may be a little bit worse.  She has been using an albuterol inhaler which seems to help that in addition to her oxygen therapy.  She uses 3 liters of oxygen at rest and 4-5 liters with activity.      REVIEW OF SYSTEMS   CARDIAC:  She denies exertional chest pain, syncope, dizziness or palpitations.   GASTROINTESTINAL:  She denies reflux or heartburn.   MUSCULOSKELETAL:  She has obvious arthritic changes in her hands but does not think she has felt to have active arthritis in years.      PHYSICAL EXAMINATION:   VITAL SIGNS:  Her O2 sats today were 93% on oxygen.   HEENT:  Eyes are anicteric.  Mouth and throat are without lesions.   NECK:  No adenopathy.   CHEST:  Crackles half way up bilaterally.   HEART:  Regular rate and rhythm, normal S1 and S2.   ABDOMEN:  Overweight, nontender, no hepatosplenomegaly.   EXTREMITIES:  Without clubbing, cyanosis or edema.   MUSCULOSKELETAL:  Chronic arthritic changes in the fingers.  Do not see bogginess or redness of the joints currently.   SKIN:  Some patches of eczema, otherwise negative.  :    PFTs:  She had a 6-minute walk test and on 5 liters of oxygen, her sats stayed above 90%.  Her FEV1 today was 2.91 - 50% of predicted, FVC 1.24 or 54% of predicted and diffusion capacity 52% of predicted.  All of these are about roughly the same as her last few visits with me.  Of  note, she did have a chest CT scan in the outside, which suggests that there has been some mild progression of her pulmonary fibrosis.      ASSESSMENT AND PLAN:  Wanda Kaur 73-year-old woman with interstitial lung disease, the etiology is not clear.  Her CT scan suggestive of fibrotic NSIP and she has previously been diagnosed with seronegative rheumatoid arthritis.  It seems most likely diagnosis is RA related ILD.  IPF remains a remote possibility, but we think that is less likely given that she carries a diagnosis of seronegative rheumatoid arthritis.  In the past she has not tolerated CellCept and prednisone and we had a long discussion regarding these medications and currently she is in favor am not being treated with them.  We also briefly discussed the issue of trying profibrotic agents, either Esbriet or Ofev and she was not interested in those medications either.  At this point in time, I will in addition to the albuterol inhaler will try Serevent, a long-acting bronchodilator, to see whether she gets some benefit from that.  She has been instructed to get the flu shot and to check on her vaccinations to make sure all the vaccinations are up-to-date.           Fernando Goetz MD  Pulmonary/Critical Care  September 18, 2019 12:18 PM

## 2019-09-18 NOTE — LETTER
9/18/2019       RE: Wanda Kaur  [address] [place] [address]     Dear Colleague,    Thank you for referring your patient, Wanda Kaur, to the [facility] at [facility]. Please see a copy of my visit note below.    HISTORY OF PRESENT ILLNESS:  The patient is a 73-year-old female with interstitial lung disease of unclear etiology, thought to be an ILD related to seronegative rheumatoid arthritis less likely IPF.  In 2016, she had an exacerbation of ILD possibly related to pneumonia and was hospitalized for a prolonged period of time.  In the aftermath of that she was placed on CellCept and prednisone and felt terrible while being on these medications and self-tapered herself off of them.  I began following her in 2018.  Her last visit with me was in 03/2019.  Overall, her breathing is about the same.  She thinks her cough may be a little bit worse.  She has been using an albuterol inhaler which seems to help that in addition to her oxygen therapy.  She uses 3 liters of oxygen at rest and 4-5 liters with activity.      REVIEW OF SYSTEMS   CARDIAC:  She denies exertional chest pain, syncope, dizziness or palpitations.   GASTROINTESTINAL:  She denies reflux or heartburn.   MUSCULOSKELETAL:  She has obvious arthritic changes in her hands but does not think she has felt to have active arthritis in years.      PHYSICAL EXAMINATION:   VITAL SIGNS:  Her O2 sats today were 93% on oxygen.   HEENT:  Eyes are anicteric.  Mouth and throat are without lesions.   NECK:  No adenopathy.   CHEST:  Crackles half way up bilaterally.   HEART:  Regular rate and rhythm, normal S1 and S2.   ABDOMEN:  Overweight, nontender, no hepatosplenomegaly.   EXTREMITIES:  Without clubbing, cyanosis or edema.   MUSCULOSKELETAL:  Chronic arthritic changes in the fingers.  Do not see bogginess or redness of the joints currently.   SKIN:  Some patches of  eczema, otherwise negative.  :    PFTs:  She had a 6-minute walk test and on 5 liters of oxygen, her sats stayed above 90%.  Her FEV1 today was 2.91 - 50% of predicted, FVC 1.24 or 54% of predicted and diffusion capacity 52% of predicted.  All of these are about roughly the same as her last few visits with me.  Of note, she did have a chest CT scan in the outside, which suggests that there has been some mild progression of her pulmonary fibrosis.      ASSESSMENT AND PLAN:  Wanda Kaur 73-year-old woman with interstitial lung disease, the etiology is not clear.  Her CT scan suggestive of fibrotic NSIP and she has previously been diagnosed with seronegative rheumatoid arthritis.  It seems most likely diagnosis is RA related ILD.  IPF remains a remote possibility, but we think that is less likely given that she carries a diagnosis of seronegative rheumatoid arthritis.  In the past she has not tolerated CellCept and prednisone and we had a long discussion regarding these medications and currently she is in favor am not being treated with them.  We also briefly discussed the issue of trying profibrotic agents, either Esbriet or Ofev and she was not interested in those medications either.  At this point in time, I will in addition to the albuterol inhaler will try Serevent, a long-acting bronchodilator, to see whether she gets some benefit from that.  She has been instructed to get the flu shot and to check on her vaccinations to make sure all the vaccinations are up-to-date.           Fernando Goetz MD  Pulmonary/Critical Care  September 18, 2019 12:18 PM                Again, thank you for allowing me to participate in the care of your patient.      Sincerely,    Fernando Goetz MD

## 2019-09-18 NOTE — NURSING NOTE
Chief Complaint   Patient presents with     RECHECK     Fibrotic NSIP    Medications reviewed and vital signs taken.   Reji Renee, CMA

## 2019-09-20 ENCOUNTER — ALLIED HEALTH/NURSE VISIT (OUTPATIENT)
Dept: NURSING | Facility: CLINIC | Age: 74
End: 2019-09-20
Payer: COMMERCIAL

## 2019-09-20 DIAGNOSIS — Z23 NEED FOR VACCINATION: Primary | ICD-10-CM

## 2019-09-20 DIAGNOSIS — Z23 NEED FOR PNEUMOCOCCAL VACCINATION: ICD-10-CM

## 2019-09-20 PROCEDURE — 90670 PCV13 VACCINE IM: CPT

## 2019-09-20 PROCEDURE — G0009 ADMIN PNEUMOCOCCAL VACCINE: HCPCS

## 2019-09-20 NOTE — PROGRESS NOTES
Prior to immunization administration, verified patients identity using patient s name and date of birth. Please see Immunization Activity for additional information.     Screening Questionnaire for Adult Immunization    Are you sick today?   No   Do you have allergies to medications, food, a vaccine component or latex?   No   Have you ever had a serious reaction after receiving a vaccination?   No   Do you have a long-term health problem with heart disease, lung disease, asthma, kidney disease, metabolic disease (e.g. diabetes), anemia, or other blood disorder?   Yes   Do you have cancer, leukemia, HIV/AIDS, or any other immune system problem?   No   In the past 3 months, have you taken medications that affect  your immune system, such as prednisone, other steroids, or anticancer drugs; drugs for the treatment of rheumatoid arthritis, Crohn s disease, or psoriasis; or have you had radiation treatments?   No   Have you had a seizure, or a brain or other nervous system problem?   No   During the past year, have you received a transfusion of blood or blood     products, or been given immune (gamma) globulin or antiviral drug?   No   For women: Are you pregnant or is there a chance you could become        pregnant during the next month?   No   Have you received any vaccinations in the past 4 weeks?   No     Immunization questionnaire was positive for at least one answer.        Per orders of Dr. Delatorre, injection of Prevnar 13 given by Alexandra Singh MA. Patient instructed to remain in clinic for 15 minutes afterwards, and to report any adverse reaction to me immediately.       Screening performed by Alexandra Singh MA on 9/20/2019 at 10:06 AM.

## 2019-09-23 LAB
DLCOUNC-%PRED-PRE: 52 %
DLCOUNC-PRE: 8.48 ML/MIN/MMHG
DLCOUNC-PRED: 16.14 ML/MIN/MMHG
ERV-%PRED-PRE: 58 %
ERV-PRE: 0.25 L
ERV-PRED: 0.43 L
EXPTIME-PRE: 6.62 SEC
FEF2575-%PRED-PRE: 39 %
FEF2575-PRE: 0.63 L/SEC
FEF2575-PRED: 1.58 L/SEC
FEFMAX-%PRED-PRE: 50 %
FEFMAX-PRE: 2.36 L/SEC
FEFMAX-PRED: 4.67 L/SEC
FEV1-%PRED-PRE: 50 %
FEV1-PRE: 0.91 L
FEV1FEV6-PRE: 73 %
FEV1FEV6-PRED: 79 %
FEV1FVC-PRE: 73 %
FEV1FVC-PRED: 78 %
FEV1SVC-PRE: 97 %
FEV1SVC-PRED: 76 %
FIFMAX-PRE: 2.7 L/SEC
FVC-%PRED-PRE: 54 %
FVC-PRE: 1.24 L
FVC-PRED: 2.29 L
IC-%PRED-PRE: 35 %
IC-PRE: 0.68 L
IC-PRED: 1.93 L
VA-%PRED-PRE: 60 %
VA-PRE: 2.28 L
VC-%PRED-PRE: 39 %
VC-PRE: 0.93 L
VC-PRED: 2.36 L

## 2019-10-01 ENCOUNTER — HEALTH MAINTENANCE LETTER (OUTPATIENT)
Age: 74
End: 2019-10-01

## 2019-10-15 ENCOUNTER — PATIENT OUTREACH (OUTPATIENT)
Dept: PULMONOLOGY | Facility: CLINIC | Age: 74
End: 2019-10-15

## 2019-10-15 ENCOUNTER — HOSPITAL ENCOUNTER (EMERGENCY)
Facility: CLINIC | Age: 74
Discharge: HOME | End: 2019-10-15
Attending: EMERGENCY MEDICINE | Admitting: EMERGENCY MEDICINE
Payer: COMMERCIAL

## 2019-10-15 ENCOUNTER — APPOINTMENT (OUTPATIENT)
Dept: CT IMAGING | Facility: CLINIC | Age: 74
End: 2019-10-15
Attending: EMERGENCY MEDICINE
Payer: COMMERCIAL

## 2019-10-15 VITALS
HEIGHT: 59 IN | HEART RATE: 82 BPM | WEIGHT: 139.33 LBS | SYSTOLIC BLOOD PRESSURE: 121 MMHG | TEMPERATURE: 99.3 F | DIASTOLIC BLOOD PRESSURE: 69 MMHG | BODY MASS INDEX: 28.09 KG/M2 | OXYGEN SATURATION: 100 %

## 2019-10-15 DIAGNOSIS — J84.9 ILD (INTERSTITIAL LUNG DISEASE) (H): ICD-10-CM

## 2019-10-15 DIAGNOSIS — R07.89 CHEST WALL PAIN: ICD-10-CM

## 2019-10-15 LAB
ANION GAP SERPL CALCULATED.3IONS-SCNC: 4 MMOL/L (ref 3–14)
BASOPHILS # BLD AUTO: 0 10E9/L (ref 0–0.2)
BASOPHILS NFR BLD AUTO: 0.5 %
BUN SERPL-MCNC: 16 MG/DL (ref 7–30)
CALCIUM SERPL-MCNC: 8.8 MG/DL (ref 8.5–10.1)
CHLORIDE SERPL-SCNC: 107 MMOL/L (ref 94–109)
CO2 SERPL-SCNC: 29 MMOL/L (ref 20–32)
CREAT SERPL-MCNC: 0.75 MG/DL (ref 0.52–1.04)
D DIMER PPP FEU-MCNC: 0.6 UG/ML FEU (ref 0–0.5)
DIFFERENTIAL METHOD BLD: NORMAL
EOSINOPHIL # BLD AUTO: 0.2 10E9/L (ref 0–0.7)
EOSINOPHIL NFR BLD AUTO: 1.8 %
ERYTHROCYTE [DISTWIDTH] IN BLOOD BY AUTOMATED COUNT: 12.6 % (ref 10–15)
GFR SERPL CREATININE-BSD FRML MDRD: 79 ML/MIN/{1.73_M2}
GLUCOSE SERPL-MCNC: 97 MG/DL (ref 70–99)
HCT VFR BLD AUTO: 37.4 % (ref 35–47)
HGB BLD-MCNC: 12 G/DL (ref 11.7–15.7)
IMM GRANULOCYTES # BLD: 0 10E9/L (ref 0–0.4)
IMM GRANULOCYTES NFR BLD: 0.2 %
INTERPRETATION ECG - MUSE: NORMAL
LYMPHOCYTES # BLD AUTO: 0.8 10E9/L (ref 0.8–5.3)
LYMPHOCYTES NFR BLD AUTO: 9.5 %
MCH RBC QN AUTO: 31.1 PG (ref 26.5–33)
MCHC RBC AUTO-ENTMCNC: 32.1 G/DL (ref 31.5–36.5)
MCV RBC AUTO: 97 FL (ref 78–100)
MONOCYTES # BLD AUTO: 0.5 10E9/L (ref 0–1.3)
MONOCYTES NFR BLD AUTO: 6.1 %
NEUTROPHILS # BLD AUTO: 6.7 10E9/L (ref 1.6–8.3)
NEUTROPHILS NFR BLD AUTO: 81.9 %
NRBC # BLD AUTO: 0 10*3/UL
NRBC BLD AUTO-RTO: 0 /100
PLATELET # BLD AUTO: 237 10E9/L (ref 150–450)
POTASSIUM SERPL-SCNC: 4.5 MMOL/L (ref 3.4–5.3)
RBC # BLD AUTO: 3.86 10E12/L (ref 3.8–5.2)
SODIUM SERPL-SCNC: 140 MMOL/L (ref 133–144)
TROPONIN I SERPL-MCNC: <0.015 UG/L (ref 0–0.04)
WBC # BLD AUTO: 8.2 10E9/L (ref 4–11)

## 2019-10-15 PROCEDURE — 85025 COMPLETE CBC W/AUTO DIFF WBC: CPT | Performed by: EMERGENCY MEDICINE

## 2019-10-15 PROCEDURE — 93005 ELECTROCARDIOGRAM TRACING: CPT

## 2019-10-15 PROCEDURE — 25000125 ZZHC RX 250: Performed by: EMERGENCY MEDICINE

## 2019-10-15 PROCEDURE — 85379 FIBRIN DEGRADATION QUANT: CPT | Performed by: EMERGENCY MEDICINE

## 2019-10-15 PROCEDURE — 80048 BASIC METABOLIC PNL TOTAL CA: CPT | Performed by: EMERGENCY MEDICINE

## 2019-10-15 PROCEDURE — 25000128 H RX IP 250 OP 636: Performed by: EMERGENCY MEDICINE

## 2019-10-15 PROCEDURE — 71275 CT ANGIOGRAPHY CHEST: CPT

## 2019-10-15 PROCEDURE — 84484 ASSAY OF TROPONIN QUANT: CPT | Performed by: EMERGENCY MEDICINE

## 2019-10-15 PROCEDURE — 99285 EMERGENCY DEPT VISIT HI MDM: CPT | Mod: 25

## 2019-10-15 RX ORDER — IOPAMIDOL 755 MG/ML
500 INJECTION, SOLUTION INTRAVASCULAR ONCE
Status: COMPLETED | OUTPATIENT
Start: 2019-10-15 | End: 2019-10-15

## 2019-10-15 RX ADMIN — SODIUM CHLORIDE 74 ML: 9 INJECTION, SOLUTION INTRAVENOUS at 16:47

## 2019-10-15 RX ADMIN — IOPAMIDOL 53 ML: 755 INJECTION, SOLUTION INTRAVENOUS at 16:47

## 2019-10-15 ASSESSMENT — MIFFLIN-ST. JEOR: SCORE: 1042.63

## 2019-10-15 NOTE — DISCHARGE INSTRUCTIONS
Discuss your possible cardiomegaly with your PCP.  May need an US/echocardiogram.     Please return to the ED if you have active chest pain, shortness of breath, nausea, sweatiness, or other acute changes.  Please follow up with your PCP in the next 2-3 days.      Discharge Instructions  Chest Pain    You have been seen today for chest pain or discomfort.  At this time, your provider has found no signs that your chest pain is due to a serious or life-threatening condition, (or you have declined more testing and/or admission to the hospital). However, sometimes there is a serious problem that does not show up right away. Your evaluation today may not be complete and you may need further testing and evaluation.     Generally, every Emergency Department visit should have a follow-up clinic visit with either a primary or a specialty clinic/provider. Please follow-up as instructed by your emergency provider today.  Return to the Emergency Department if:  Your chest pain changes, gets worse, starts to happen more often, or comes with less activity.  You are newly short of breath.  You get very weak or tired.  You pass out or faint.  You have any new symptoms, like fever, cough, numb legs, or you cough up blood.  You have anything else that worries you.    Until you follow-up with your regular provider, please do the following:  Take one aspirin daily unless you have an allergy or are told not to by your provider.  If a stress test appointment has been made, go to the appointment.  If you have questions, contact your regular provider.  Follow-up with your regular provider/clinic as directed; this is very important.    If you were given a prescription for medicine here today, be sure to read all of the information (including the package insert) that comes with your prescription.  This will include important information about the medicine, its side effects, and any warnings that you need to know about.  The pharmacist who  fills the prescription can provide more information and answer questions you may have about the medicine.  If you have questions or concerns that the pharmacist cannot address, please call or return to the Emergency Department.       Remember that you can always come back to the Emergency Department if you are not able to see your regular provider in the amount of time listed above, if you get any new symptoms, or if there is anything that worries you.

## 2019-10-15 NOTE — PROGRESS NOTES
[facility]:  Care Coordination Note     SITUATION   Wanda Kaur is a 73 year old female who is receiving support for:  Symptoms (chest pain)  .    BACKGROUND     Started having chest pain/lung pain in her right side yesterday. Hard to sleep last night. Felt a little better this morning but getting worse again. Moving up into shoulders.       PLAN     Nursing Action/Patient Instruction: Recommending she seek emergency care to be further evaluated.     Patient Response/Questions/Concerns: Agreed with plan, has friend she is with who can take her.     Muriel Parker RN  ILD Care Coordinator  [phone]

## 2019-10-15 NOTE — ED AVS SNAPSHOT
[facility] Emergency Department  [address]  [facility] [address]  Phone:  [phone]  Fax:  [phone]                                    Wanda Kaur   MRN: [id number]    Department:  [facility] Emergency Department   Date of Visit:  10/15/2019           After Visit Summary Signature Page    I have received my discharge instructions, and my questions have been answered. I have discussed any challenges I see with this plan with the nurse or doctor.    ..........................................................................................................................................  Patient/Patient Representative Signature      ..........................................................................................................................................  Patient Representative Print Name and Relationship to Patient    ..................................................               ................................................  Date                                   Time    ..........................................................................................................................................  Reviewed by Signature/Title    ...................................................              ..............................................  Date                                               Time          22EPIC Rev 08/18

## 2019-10-15 NOTE — ED PROVIDER NOTES
History     Chief Complaint:  Chest pain     HPI   Wanda Kaur is a 73 year old female on oxygen with a history of interstitial lung disease who presents with chest pain. The patient was eating dinner yesterday when she began experiencing right sided chest wall pain with inhalation. This pain is exacerbated with cough, which occasionally has clear production. Today, the pain seems to be radiating to her right shoulder.  She reports having to use albuterol every 2 hours over the last 2 months where previously she could make it about 3 hours. The patient took ibuprofen for her pain but does not feel this helped her pain. She notes that she has a glass of wine with dinner and usually has a high heart rate. The patient denies chest pain on the left side, fever, chills, numbness, tingling, or abdominal pain. She has no history of PE or kidney problems and does not smoke or use street drugs. There is history of stroke in her family but not other heart disease. Of note, the patient states that she is pretty sedentary. She regularly sits crosse legged and noted recent pain in both of her calves that has since resolved. She attributes this to muscle pain.     Allergies:  NKDA    Medications:   albuterol  Serevent  sennosides     Past Medical History:    Asthma  Sciatica  Rheumatoid arthritis  Osteoarthritis of the hand  Enthesopathy of hip  Right carotid bruit  Anemia  Interstitial lung disease  Chronic respiratory failure with hypoxia    Past Surgical History:    Abdomen surgery  Right and left shoulder arthroplasty  Vats procedure for thymoma biopsy  Left breast cyst surgery  Right carpal tunnel release  Colonoscopy  Posterior cervical foraminotomy  Cervical disc fusion  Esophageal impedence function test  Laparoscopic cholecystectomy  Head and neck surgery  Combine hysterectomy and bilateral salpingo-oophorectomy  Thoracoscopic wedge resection lung    Family History:    Mother - COPD at 64  Father - CVA at  "81    Social History:  Negative for tobacco use.  Alcohol use: 1 drink daily  Marital Status:   [2]    Review of Systems   All other systems reviewed and are negative.      Physical Exam     Patient Vitals for the past 24 hrs:   BP Temp Temp src Pulse Heart Rate SpO2 Height Weight   10/15/19 1630 123/76 -- -- 82 -- 100 % -- --   10/15/19 1615 124/59 -- -- 78 -- 99 % -- --   10/15/19 1600 119/65 -- -- 79 -- 100 % -- --   10/15/19 1545 -- -- -- -- -- 99 % -- --   10/15/19 1517 121/61 -- -- -- -- -- -- --   10/15/19 1516 -- 99.3  F (37.4  C) Temporal -- 102 95 % 1.499 m (4' 11\") 63.2 kg (139 lb 5.3 oz)       Physical Exam  General: Resting on the bed.  Head: No obvious trauma to head.  Ears, Nose, Throat:  External ears normal.  Nose normal.    Eyes:  Conjunctivae clear.    CV: Regular rate and rhythm.  No murmurs.   Right lateral chest wall pain tender to palpation.  2+ radial pulses  Respiratory: Effort normal and breath sounds normal.  No wheezing or crackles.   Gastrointestinal: Soft.  No distension. There is no tenderness.   Musculoskeletal: Non tender non edematous calves   Neuro: Alert. Moving all extremities appropriately.  Normal speech.    Skin: Skin is warm and dry.  No rash noted.     Emergency Department Course     ECG:  Indication: chest pain   Time: 1556  Vent. Rate 86 bpm. NC interval 152. QRS duration 78. QT/QTc 364/435. P-R-T axis 25 9 10.  Sinus rhythm with premature atrial complexes. Cannot rule out inferior infarct, age undetermined. Abnormal ECG. No significant change compared to EKG dated 4/4/10. Read time: 1556    Imaging:  Radiology findings were communicated with the patient who voiced understanding of the findings.    CT chest pulmonary embolism w/ IV contrast:   1. No evidence of pulmonary embolism. Thoracic aorta is unremarkable  with scattered calcified plaque.  2. Patchy airspace opacities bilaterally slightly worse when compared  to prior study from 9/9/2019 concerning for " pneumonia. Underlying  interstitial lung disease remains in the differential.  3. Cardiomegaly. No pleural effusions or interlobular septal  thickening to suggest pulmonary edema, as per radiology.    Laboratory:  Laboratory findings were communicated with the patient who voiced understanding of the findings.    CBC: WBC 8.2, HGB 12.0,    BMP: WNL (Creatinine 0.75)    D dimer quantitative: 0.6 (H)   1539 Troponin: <0.015    Emergency Department Course:  Past medical records, nursing notes, and vitals reviewed.    1510: I performed an exam of the patient as documented above.     EKG obtained in the ED, see results above.   IV was inserted and blood was drawn for laboratory testing, results above.  The patient was sent for a chest/ PE CT while in the emergency department, results above.     1802: Patient rechecked and updated.      I personally reviewed the laboratory, imaging, and EKG results with the Patient and answered all related questions prior to discharge.     Impression & Plan     Medical Decision Makin-year-old female with a known history of interstitial lung disease presents with right chest wall pain.  Vital signs initially tachycardic but otherwise unremarkable.  Patient is at baseline 4 L.  Broad differential was pursued including not limited to chest wall pain, pneumonia, pneumothorax, effusion, PE, arrhythmia, acute coronary syndrome, etc.  Overall patient's well-appearing nontoxic.  CBC shows no leukocytosis or anemia.  BMP shows no acute electrolyte, metabolic or renal dysfunction.  EKG shows sinus rhythm no acute ST-T wave changes.  No evidence of active ischemia.  No evidence of arrhythmia.  Troponin is negative.  Symptoms have been present for greater than 24 hours therefore suspect this is a very reassuring work-up.  Heart score 2 as history is not consistent with acute coronary syndrome.  Patient appears to have reproducible right chest wall pain.  There is no left-sided symptoms,  shortness of breath, diaphoresis or nausea.  Not suggestive of ACS.  Considered PE, d-dimer was elevated.  CT PE shows no evidence of PE or dissection.  There is patchy airspace opacities bilaterally that appear to be slightly worse than prior.  Patient has no leukocytosis, no worsening cough, no fevers, no productive sputum.  This does not appear consistent with pneumonia.  This seems most consistent with patient's worsening interstitial lung disease.  I had a lengthy discussion with patient and she felt that she had no symptoms or signs suggestive of pneumonia.  She had some mild cardiomegaly as well but no evidence of pleural effusion or peripheral edema.  No suggestion of CHF.  Patient's symptoms do not seem consistent with CHF either.  This seems the most likely chest wall pain.  It is reproduced on the right chest wall.  I discussed supportive cares and encourage close follow-up with her primary doctor discussed the possible cardiomegaly.  Any worsening or changing symptoms she was encouraged to return to the emergency department.  Patient voiced understanding and wishes to go home.      Discharge Diagnosis:    ICD-10-CM    1. Chest wall pain R07.89    2. ILD (interstitial lung disease) (H) J84.9        Disposition:  Discharged to home.    Scribe Disclosure:  I, Naty Johnson, am serving as a scribe at 4:40 PM on 10/15/2019 to document services personally performed by Nabila Guy MD based on my observations and the provider's statements to me.      Nabila Guy MD  10/15/19 5221

## 2019-10-15 NOTE — ED TRIAGE NOTES
Pt has interstitial lung disease.  Pt has right sided pain with inspiration since last evening.  Pain is much worse with deep breath or cough.  She is on continuous oxygen.

## 2019-10-21 ENCOUNTER — TRANSFERRED RECORDS (OUTPATIENT)
Dept: HEALTH INFORMATION MANAGEMENT | Facility: CLINIC | Age: 74
End: 2019-10-21

## 2019-10-23 ENCOUNTER — PATIENT OUTREACH (OUTPATIENT)
Dept: PULMONOLOGY | Facility: CLINIC | Age: 74
End: 2019-10-23

## 2019-10-23 DIAGNOSIS — J84.9 ILD (INTERSTITIAL LUNG DISEASE) (H): Primary | ICD-10-CM

## 2019-10-23 RX ORDER — AZITHROMYCIN 250 MG/1
TABLET, FILM COATED ORAL
Qty: 6 TABLET | Refills: 0 | Status: SHIPPED | OUTPATIENT
Start: 2019-10-23 | End: 2020-05-28

## 2019-10-23 NOTE — PROGRESS NOTES
Patient called with symptoms complaining of productive cough and low grade fever.  Started with URI type symptoms a couple of days ago and has now settled in her chest.   Patient states denies worsening shortness of breath or hypoxia.  Discussed with Dr. Goetz who prescribed a z-gera.  Patient instructed to call if symptoms get worse.

## 2019-10-31 ENCOUNTER — TELEPHONE (OUTPATIENT)
Dept: PEDIATRICS | Facility: CLINIC | Age: 74
End: 2019-10-31

## 2019-10-31 NOTE — TELEPHONE ENCOUNTER
Her cough is worsening. Usually clear mucus, yellow at times. No fever.     Albuterol inhaler every 2 hours.     Concern is increased cough and full sinuses. Has tried zyrtec today, also has tried sudafed and Nyquil.    Denies increased SOB. She is not able to tolerate much activity. The longer we talk the more SOB she becomes.    24/7 O2 at 4L.    Took a Zpak starting 10/23. Increase in sinus congestion and cough since then.     Advised she call her Pulmonary clinic to see what they recommend since they just treated her. She is agreeable to this plan.    Charity Thomas RN on 10/31/2019 at 2:44 PM

## 2019-11-27 NOTE — PROGRESS NOTES
"Subjective   Wanda Kaur is a 73 year old female who presents to clinic today for the following health issues:    HPI   Diverticulitis Follow up       Duration: Ongoing since  8/23/19     Description (location/character/radiation): twinge every once and while lower left quadrant and bilateral flank pain is aching     Intensity:  Mild/slight- LLQ and flank pain-aching     Accompanying signs and symptoms: None     History (similar episodes/previous evaluation): hx of using albuterol     Precipitating or alleviating factors: back rubs help     Therapies tried and outcome: Finishing the Augmentin      #Diverticulitis follow up  First episode. Last colonoscopy in 2011; noted diverticulosis at that point.   Currently has 2 tablets left, of augmentin, was recently started on it w/ diagnosis on CT   Abdominal pain has improved, not feeling bloated or \"crummy\"  Afebrile at home. Bowel movements - stable; no diarrhea or bloody stools.   Appetite is not wonderful, but OK, no vomiting.   Not taking anything currently for the pain, has not needed anything     #chronic lower back pain   Lower back pain  - going on for a while - months  No urinary symptomns, no numbness or tingling   Has been using albuterol every 2 hours, flank area is not painful.    Ibuprofen at night intermittently.     Patient Active Problem List   Diagnosis     Other symptoms involving cardiovascular system     Enthesopathy of hip region     Osteoarthrosis, hand     CARDIOVASCULAR SCREENING; LDL GOAL LESS THAN 160     Advance Care Planning     Mediastinal mass     Enthesopathy of hip region, unspecified laterality     Neck pain     Rheumatoid arthritis, involving unspecified site, unspecified rheumatoid factor presence (H)     Immunosuppression (H)     Pneumonia of both upper lobes (H)     Hypoxia     Health Care Home     Pneumonia of both lungs due to methicillin resistant Staphylococcus aureus (MRSA), unspecified part of lung (H)     Methotrexate " lung     Chronic respiratory failure with hypoxia (H)     ILD (interstitial lung disease) (H)     Sick-euthyroid syndrome     Anemia, unspecified type     Abnormal CT of the chest     Status post coronary angiogram     Past Surgical History:   Procedure Laterality Date     ABDOMEN SURGERY  20yrs plus    lap     ARTHROPLASTY SHOULDER  10/2010    right     ARTHROPLASTY SHOULDER  2011    Procedure:ARTHROPLASTY SHOULDER; Left Total Shoulder Arthroplasty  ; Surgeon:HERBERT LIAO; Location:[facility] OR     BIOPSY      Vats proceedure for thymoma     BREAST SURGERY Left     breast cysts     C NONSPECIFIC PROCEDURE      L breast cysts      CARPAL TUNNEL RELEASE RT/LT      R carpal release     COLONOSCOPY       FORAMINOTOMY CERVICAL POSTERIOR TWO LEVELS N/A 2018    Procedure: FORAMINOTOMY CERVICAL POSTERIOR TWO LEVELS;  POSTERIOR CERVICAL FORAMINOTOMY C7-T1;  Surgeon: Jesus Camejo MD;  Location: [facility] OR     FUSION CERVICAL ANTERIOR ONE LEVEL      L c6-7 cervical disc with fusion     HC ESOPH/GAS REFLUX TEST W NASAL IMPED >1 HR N/A 2016    Procedure: ESOPHAGEAL IMPEDENCE FUNCTION TEST WITH 24 HOUR PH GREATER THAN 1 HOUR;  Surgeon: Bret Magdaleno MD;  Location: [facility]     HC LAPAROSCOPY, SURGICAL; CHOLECYSTECTOMY      lap choly      HEAD & NECK SURGERY       HYSTERECTOMY TOTAL ABDOMINAL, BILATERAL SALPINGO-OOPHORECTOMY, COMBINED       THORACOSCOPIC WEDGE RESECTION LUNG Right 3/24/2015    Procedure: THORACOSCOPIC WEDGE RESECTION LUNG;  Surgeon: Júnior Oakes MD;  Location:  OR       Social History     Tobacco Use     Smoking status: Never Smoker     Smokeless tobacco: Never Used     Tobacco comment: smoked very briefly as a teen   Substance Use Topics     Alcohol use: Yes     Alcohol/week: 0.6 oz     Types: 1 Standard drinks or equivalent per week     Comment: 1 glass of wine daily     Family History   Problem Relation Age of Onset     Respiratory Mother         COPD  at 64      Heart Disease Father          of CVA at 81     Cerebrovascular Disease Maternal Grandmother      Cerebrovascular Disease Paternal Grandmother          Current Outpatient Medications   Medication Sig Dispense Refill     albuterol (2.5 MG/3ML) 0.083% neb solution Take 1 vial by nebulization every 3 hours       albuterol (PROAIR HFA/PROVENTIL HFA/VENTOLIN HFA) 108 (90 Base) MCG/ACT inhaler Inhale 2 puffs into the lungs every 3 hours 36 g 11     amoxicillin-clavulanate (AUGMENTIN) 875-125 MG tablet Take 1 tablet by mouth 2 times daily 20 tablet 0     ibuprofen (ADVIL,MOTRIN) 200 MG tablet Take 400 mg by mouth every 4 hours as needed for mild pain       order for DME Equipment being ordered: Liquid oxygen, 4 L flow rate, needs for home, dispense supplies for 99 months, needed for interstitial lung disease- please send form if this does not work 1 Device 11     order for DME Equipment being ordered:     Oxygen 3-6 L based on oxygen saturations  Indication- fibrotic lung disease- hypoxemia  O2 without oxygen 79%, with 4L oxygen at rest 4L, needs up to 6L with respiratory illness  Needed 99 months 1 Month 11     order for DME Please provide patient with liquid oxygen with home fill system.  Patient requires 6 liters oxygen per minute via nasal canula with activity.  Please provide patient with oximyzer. This is for lifetime use. 1 Device 0     Respiratory Therapy Supplies (NEBULIZER/TUBING/MOUTHPIECE) KIT Dispense all necessary supplies for nebulizer machine 1 kit 99     sennosides (SENOKOT) 8.6 MG tablet Take 1 tablet by mouth daily as needed for constipation       Vitamin D, Cholecalciferol, 400 UNITS CHEW Take 2 chew tab by mouth daily       Allergies   Allergen Reactions     No Known Drug Allergies      Reviewed and updated as needed this visit by Provider  Meds         Review of Systems   ROS COMP: Constitutional, HEENT, cardiovascular, pulmonary, gi and gu systems are negative, except as otherwise noted.     "  Objective    /58 (BP Location: Right arm, Patient Position: Chair, Cuff Size: Adult Regular)   Pulse 87   Temp 97.5  F (36.4  C) (Oral)   Ht 1.499 m (4' 11.02\")   Wt 63.5 kg (140 lb 1.6 oz)   LMP  (LMP Unknown)   SpO2 98%   BMI 28.28 kg/m    Body mass index is 28.28 kg/m .  Physical Exam   GENERAL: healthy, alert and no distress  NECK: no adenopathy, no asymmetry, masses, or scars  RESP: NC in place on 4 L chronically, lungs clear to auscultation - no rales, rhonchi or wheezes  CV: regular rate and rhythm, normal S1 S2, no S3 or S4, no murmur, click or rub, no peripheral edema and peripheral pulses strong  ABDOMEN: soft, nontender, no hepatosplenomegaly, no masses and bowel sounds normal. No CVA tenderness. Nontender to palpation in LLQ.   MS: no gross musculoskeletal defects noted, no edema. Non tender to palpation in lower back.     Diagnostic Test Results:  Labs reviewed in Epic        Assessment & Plan     1. Diverticulitis of colon  Currently finishing course of augmentin; denies abdominal pain today in clinic. Pain has resolved. No change in BMs or new symptoms. Did discuss obtaining colonoscopy in 6-8 weeks after acute flare; pt states this is the first episode and last colonoscopy in 2011 noted diverticulosis. Considering her ILD, did discuss with patient that option to do colonoscopy to r/o colon ca as etiology of flare should be a shared decision. Pt stated that she preferred not to proceed with follow up colonoscopy as she was not sure she would \"do anything about it\" if colon CA was indeed found. Did discuss that this was a reasonable decision.   -provided reference sheets on diverticulitis.   -finish course of augmentin     BMI:   Estimated body mass index is 28.28 kg/m  as calculated from the following:    Height as of this encounter: 1.499 m (4' 11.02\").    Weight as of this encounter: 63.5 kg (140 lb 1.6 oz).       Follow up as needed.     This patient was seen and discussed with  " Umberto-Nguyễn.       Ila Moreno MD  [facility]    -------------------------------------  Staff note:  I discussed this case in depth with Dr. Moreno and agree with the key components of the history, assessment and plan.      Marlene Shipman MD  Internal Medicine/Pediatrics           General: NAD  HEENT:  NC/AT  Neuro: A&Ox4, no focal deficits noted  Respiratory: mildly diminished in the bases B/L BS, no wheeze, no rhonchi noted  Cardiovascular: irregularly irregular rhythm, tachycardic, normal S1S2, no murmur noted  GI: Abd soft, NT/ND, +BSx4Q +BM  Peripheral Vascular:  trace B/L LE edema, 2+ peripheral pulses  Psychiatric: Normal mood, normal affect observed  Skin: Normal exam to inspection and palpation. no bleeding, no hematoma. General: NAD  HEENT:  NC/AT  Neuro: A&Ox4, no focal deficits noted  Respiratory: mildly diminished in the bases B/L BS, no wheeze, no rhonchi noted  Cardiovascular: irregularly irregular rhythm, tachycardic, normal S1S2, no murmur noted  GI: Abd soft, NT/ND, +BSx4Q +BM  Peripheral Vascular:  trace B/L LE edema, 2+ peripheral pulses  Psychiatric: Normal mood, normal affect observed  Skin: Normal exam to inspection and palpation. General: NAD  HEENT:  NC/AT  Neuro: A&Ox4, no focal deficits noted  Respiratory: mildly diminished in the bases B/L BS, no wheeze, no rhonchi noted  Cardiovascular: irregularly irregular rhythm, tachycardic, normal S1S2, no murmur noted  GI: Abd soft, NT/ND, +BSx4Q +BM  Peripheral Vascular:  trace B/L LE edema, 2+ peripheral pulses  Psychiatric: Normal mood, normal affect observed  Skin: Normal exam to inspection and palpation. MSI CDI CHELE stable.

## 2020-04-08 ENCOUNTER — VIRTUAL VISIT (OUTPATIENT)
Dept: PULMONOLOGY | Facility: CLINIC | Age: 75
End: 2020-04-08
Attending: INTERNAL MEDICINE
Payer: COMMERCIAL

## 2020-04-08 DIAGNOSIS — J84.9 ILD (INTERSTITIAL LUNG DISEASE) (H): Primary | ICD-10-CM

## 2020-04-08 NOTE — PROGRESS NOTES
"Subjective     Wanda Kaur is a 74 year old female who is being evaluated via a billable telephone visit.      The patient has been notified of following:     \"This telephone visit will be conducted via a call between you and your physician/provider. We have found that certain health care needs can be provided without the need for a physical exam.  This service lets us provide the care you need with a short phone conversation.  If a prescription is necessary we can send it directly to your pharmacy.  If lab work is needed we can place an order for that and you can then stop by our lab to have the test done at a later time.    Telephone visits are billed at different rates depending on your insurance coverage. During this emergency period, for some insurers they may be billed the same as an in-person visit.  Please reach out to your insurance provider with any questions.    If during the course of the call the physician/provider feels a telephone visit is not appropriate, you will not be charged for this service.\"    Patient has given verbal consent for Telephone visit? YES  Wanda Kaur complains of     The patient is a 73-year-old female with interstitial lung disease of unclear etiology, thought to be an ILD related to seronegative rheumatoid arthritis less likely IPF.  In 2016, she had an exacerbation of ILD possibly related to pneumonia and was hospitalized for a prolonged period of time.  In the aftermath of that she was placed on CellCept and prednisone and felt terrible while being on these medications and self-tapered herself off of them.  I began following her in 2018.  Her last visit with me was in 03/2019.  Overall, her breathing is about the same.  She thinks her cough may be a little bit worse.  She has been using an albuterol inhaler which seems to help that in addition to her oxygen therapy.  She uses 3 liters of oxygen at rest and 4-5 liters with activity.     ALLERGIES  No known drug " allergies        Reviewed and updated as needed this visit by Provider         Review of Systems     CARDIAC:  She denies exertional chest pain, syncope, dizziness or palpitations.   GASTROINTESTINAL:  She denies reflux or heartburn.   MUSCULOSKELETAL:  She has obvious arthritic changes in her hands but does not think she has felt to have active arthritis in years.           Assessment/Plan:  Wnada Kaur 73-year-old woman with interstitial lung disease, the etiology is not clear.  Her CT scan suggestive of fibrotic NSIP and she has previously been diagnosed with seronegative rheumatoid arthritis.  It seems most likely diagnosis is RA related ILD.  IPF remains a remote possibility, but we think that is less likely given that she carries a diagnosis of seronegative rheumatoid arthritis.  In the past she has not tolerated CellCept and prednisone and we had a long discussion regarding these medications and currently she is in favor am not being treated with them.  We also briefly discussed the issue of trying profibrotic agents, either Esbriet or Ofev and she was not interested in those medications either.  At this point in time, I will continue with the albuterol inhaler. She did not notice much benefit from Serevent - but is willing to try it again as it may reduce her need to use the albuterol inhaler. I will schedule her for follow up in 3 months with labs and pfts.    Phone call duration:  15 minutes        Fernando Goetz MD  Pulmonary/Critical Care  April 8, 2020 9:10 AM

## 2020-05-15 ENCOUNTER — PATIENT OUTREACH (OUTPATIENT)
Dept: PULMONOLOGY | Facility: CLINIC | Age: 75
End: 2020-05-15

## 2020-05-15 NOTE — PROGRESS NOTES
Patient contacted regarding oxygen questions. She is currently set up with Liquid oxygen, her oxygen carrier ([place]) wants to switch her to a home concentrator. She is using an [employer] when she leaves her house and has not been using her liquid oxygen tanks. Discussed that if her needs increased she may need to use the liquid tank or gas tank but if she would like to return liquid now that we are still able to get liquid for now, or she would get gas tank in the future. Patient wanted to make sure home concentrator would be covered by Medicare. Informed her they are. She is going to return liquid at this time.

## 2020-05-18 DIAGNOSIS — J84.9 ILD (INTERSTITIAL LUNG DISEASE) (H): ICD-10-CM

## 2020-05-19 NOTE — TELEPHONE ENCOUNTER
Routing to MA/TC pool. The Pt is due for an establish care visit. Please call and help them schedule. Please assess if the Pt has enough medication to get them through until their upcoming future visit, or if they need a refill.     Please route back to refill pool with response    Linda Garrison RN on 5/19/2020 at 11:28 AM

## 2020-05-19 NOTE — TELEPHONE ENCOUNTER
Routing refill request to provider for review/approval because:  Outdated ACT    Linda Garrison RN on 5/19/2020 at 1:48 PM

## 2020-05-21 RX ORDER — ALBUTEROL SULFATE 90 UG/1
AEROSOL, METERED RESPIRATORY (INHALATION)
Qty: 36 G | Refills: 11 | Status: SHIPPED | OUTPATIENT
Start: 2020-05-21 | End: 2020-05-22

## 2020-05-22 RX ORDER — ALBUTEROL SULFATE 90 UG/1
2 AEROSOL, METERED RESPIRATORY (INHALATION)
Qty: 2 INHALER | Refills: 11 | Status: SHIPPED | OUTPATIENT
Start: 2020-05-22 | End: 2021-05-24

## 2020-05-22 NOTE — TELEPHONE ENCOUNTER
Please review pt's request states she uses albuterol 2 puff every 3 hours.   This is not recommended dosing.    Sima Sibley RN

## 2020-05-22 NOTE — TELEPHONE ENCOUNTER
Patient called and stating that her prescription was written wrong. Patient needs two inhalers per month because she does 2 puffs every three hours and one inhaler does not last a month. Patient has ILD and is currently on oxygen 24/7 as well.     Patient is requesting a prescription for 2 inhalers  At one time like she was prescribed before. She also wishes to cancel the current script. Patient has an appointment with her pulmonologist and is hoping to have him take over the medication in June.     Writer will call to cancel current script.    Ruby Camejo, EMT at 12:33 PM on May 22, 2020  [facility] Guide   [phone]

## 2020-05-23 ASSESSMENT — ASTHMA QUESTIONNAIRES: ACT_TOTALSCORE: 5

## 2020-05-28 ENCOUNTER — VIRTUAL VISIT (OUTPATIENT)
Dept: PEDIATRICS | Facility: CLINIC | Age: 75
End: 2020-05-28
Payer: COMMERCIAL

## 2020-05-28 DIAGNOSIS — J84.9 ILD (INTERSTITIAL LUNG DISEASE) (H): Primary | ICD-10-CM

## 2020-05-28 DIAGNOSIS — J96.11 CHRONIC RESPIRATORY FAILURE WITH HYPOXIA (H): ICD-10-CM

## 2020-05-28 DIAGNOSIS — M06.9 RHEUMATOID ARTHRITIS, INVOLVING UNSPECIFIED SITE, UNSPECIFIED RHEUMATOID FACTOR PRESENCE: ICD-10-CM

## 2020-05-28 DIAGNOSIS — R09.02 HYPOXIA: ICD-10-CM

## 2020-05-28 PROCEDURE — 99214 OFFICE O/P EST MOD 30 MIN: CPT | Mod: 95 | Performed by: PEDIATRICS

## 2020-05-28 NOTE — PROGRESS NOTES
"Wanda Kaur is a 74 year old female who is being evaluated via a billable video visit.      The patient has been notified of following:     \"This video visit will be conducted via a call between you and your physician/provider. We have found that certain health care needs can be provided without the need for an in-person physical exam.  This service lets us provide the care you need with a video conversation.  If a prescription is necessary we can send it directly to your pharmacy.  If lab work is needed we can place an order for that and you can then stop by our lab to have the test done at a later time.    Video visits are billed at different rates depending on your insurance coverage.  Please reach out to your insurance provider with any questions.    If during the course of the call the physician/provider feels a video visit is not appropriate, you will not be charged for this service.\"     Patient has given verbal consent for Video visit? Yes    How would you like to [place]    Patient would like the video invitation sent by: Send to e-mail at: [email or web address]    Will anyone else be joining your video visit? No    Subjective     Wanda Kaur is a 74 year old female who presents today via video visit for the following health issues:    HPI       ILD - dependent on oxygen.  Followed by pulmonary medicine with Dr Goetz.  Currently on oxygen 3L at rest and 4-5L with activity.  Using albuterol every 3 hours.  Started serevent again at last pulmonary visit in April - didn't help and stopped.  Doesn't have any more jitteriness with albuterol, but does have poor sleep.  Has been coughing more.  Has follow up with Dr Goetz with PFTs this summer.      Gets fatigued with activity.    RA, seronegative - off immunosuppressive medications.  Has used various DMARDS over time.  Does not tolerate prednisone well.  Most medications had significant side effects for her.  Joint pain has been " "controlled.  Will have some finger stiffness, but nothing that has required medication for some time.  Uses ibuprofen for flares when needed.  Has been following with Dr Vincent in rheumatology at [facility] in [place] - last seen a few years ago.      Had a bout of diverticulitis last fall - no issues since.      4 grown children - 1 in [place], 1 in [place], 1 in [place], 1 on [facility]        Video Start Time: 8:48am        Reviewed and updated as needed this visit by Provider               Objective    LMP  (LMP Unknown)   Estimated body mass index is 28.14 kg/m  as calculated from the following:    Height as of 10/15/19: 1.499 m (4' 11\").    Weight as of 10/15/19: 63.2 kg (139 lb 5.3 oz).  Physical Exam     GENERAL: Healthy, alert and no distress  RESP: no increased work of breathing, wearing oxygen by nasal canula, intermittent dry cough, speaking in full sentences  SKIN: Visible skin clear. No significant rash, abnormal pigmentation or lesions.  NEURO: Cranial nerves grossly intact.  Mentation and speech appropriate for age.  PSYCH: Mentation appears normal, affect normal/bright, judgement and insight intact, normal speech and appearance well-groomed.      Diagnostic Test Results:  Labs reviewed in Epic        Assessment & Plan       ICD-10-CM    1. ILD (interstitial lung disease) (H)  J84.9 Followed closely by pulmonary medicine. On oxygen, uses albuterol every 3 hours - other medications have not been helpful to date.  Next pulm visit in July with repeat PFTs   2. Hypoxia  R09.02    3. Chronic respiratory failure with hypoxia (H)  J96.11    4. Rheumatoid arthritis, involving unspecified site, unspecified rheumatoid factor presence (H)  M06.9 Doing well, follow        BMI:   Estimated body mass index is 28.14 kg/m  as calculated from the following:    Height as of 10/15/19: 1.499 m (4' 11\").    Weight as of 10/15/19: 63.2 kg (139 lb 5.3 oz).   Weight management plan: will review at [facility]          Love Nelson " MD Hernandez  [facility]      Video-Visit Details    Type of service:  Video Visit    Video End Time:9:07am    Originating Location (pt. Location): Home    Distant Location (provider location):  Home    Platform used for Video Visit: Lui Ng MD

## 2020-07-29 ASSESSMENT — ENCOUNTER SYMPTOMS
HEMOPTYSIS: 0
SPUTUM PRODUCTION: 1
WHEEZING: 0
FATIGUE: 1
TREMORS: 0
HALLUCINATIONS: 0
ARTHRALGIAS: 0
SNORES LOUDLY: 0
DIZZINESS: 1
CHILLS: 1
COUGH DISTURBING SLEEP: 0
SPEECH CHANGE: 0
DECREASED APPETITE: 0
WEAKNESS: 1
NERVOUS/ANXIOUS: 0
FEVER: 0
NECK PAIN: 0
INCREASED ENERGY: 1
PANIC: 0
WEIGHT LOSS: 0
STIFFNESS: 1
PARALYSIS: 0
SEIZURES: 0
MEMORY LOSS: 1
DISTURBANCES IN COORDINATION: 0
SHORTNESS OF BREATH: 1
MUSCLE WEAKNESS: 1
HEADACHES: 0
DECREASED CONCENTRATION: 0
ALTERED TEMPERATURE REGULATION: 1
TINGLING: 0
POSTURAL DYSPNEA: 0
NUMBNESS: 0
COUGH: 1
JOINT SWELLING: 0
MYALGIAS: 1
DEPRESSION: 1
DYSPNEA ON EXERTION: 1
MUSCLE CRAMPS: 1
POLYPHAGIA: 0
INSOMNIA: 1
LOSS OF CONSCIOUSNESS: 0
BACK PAIN: 0
WEIGHT GAIN: 0
POLYDIPSIA: 0
NIGHT SWEATS: 0

## 2020-08-12 ENCOUNTER — OFFICE VISIT (OUTPATIENT)
Dept: PULMONOLOGY | Facility: CLINIC | Age: 75
End: 2020-08-12
Payer: COMMERCIAL

## 2020-08-12 VITALS
BODY MASS INDEX: 27.87 KG/M2 | DIASTOLIC BLOOD PRESSURE: 69 MMHG | WEIGHT: 138 LBS | SYSTOLIC BLOOD PRESSURE: 124 MMHG | RESPIRATION RATE: 18 BRPM | OXYGEN SATURATION: 95 % | TEMPERATURE: 98.2 F | HEART RATE: 93 BPM

## 2020-08-12 DIAGNOSIS — J84.9 ILD (INTERSTITIAL LUNG DISEASE) (H): Primary | ICD-10-CM

## 2020-08-12 LAB
6 MIN WALK (FT): 780 FT
6 MIN WALK (M): 238 M

## 2020-08-12 PROCEDURE — G0463 HOSPITAL OUTPT CLINIC VISIT: HCPCS | Mod: 25,ZF

## 2020-08-12 RX ORDER — PREDNISONE 10 MG/1
TABLET ORAL
Qty: 30 TABLET | Refills: 1 | Status: SHIPPED | OUTPATIENT
Start: 2020-08-12 | End: 2021-05-03

## 2020-08-12 ASSESSMENT — PAIN SCALES - GENERAL: PAINLEVEL: NO PAIN (0)

## 2020-08-12 NOTE — PROGRESS NOTES
HISTORY OF PRESENT ILLNESS:  The patient is a 73-year-old female with interstitial lung disease of unclear etiology, thought to be an ILD related to seronegative rheumatoid arthritis less likely IPF.  In 2016, she had an exacerbation of ILD possibly related to pneumonia and was hospitalized for a prolonged period of time.  In the aftermath of that she was placed on CellCept and prednisone and felt terrible while being on these medications and self-tapered herself off of them. I began following her in 2018.  Her last visit with me was in 03/2019.  Overall, her breathing is about the same.  She thinks her cough is worse.  She has been using an albuterol inhaler which seems to help that in addition to her oxygen therapy.  She uses 3 liters of oxygen at rest and 4-5 liters with activity.     REVIEW OF SYSTEMS   CARDIAC:  She denies exertional chest pain, syncope, dizziness or palpitations.   GASTROINTESTINAL:  She denies reflux or heartburn.   MUSCULOSKELETAL:  She has obvious arthritic changes in her hands but does not think she has felt to have active arthritis in years.      PHYSICAL EXAMINATION:   VITAL SIGNS:  Her O2 sats today were 95% on oxygen.   HEENT:  Eyes are anicteric.  Mouth and throat are without lesions.   NECK:  No adenopathy.   CHEST:  Crackles half way up bilaterally.   HEART:  Regular rate and rhythm, normal S1 and S2.   ABDOMEN:  Overweight, nontender, no hepatosplenomegaly.   EXTREMITIES:  Without clubbing, cyanosis or edema.   MUSCULOSKELETAL:  Chronic arthritic changes in the fingers.  Do not see bogginess or redness of the joints currently.   SKIN:  Some patches of eczema, otherwise negative.          PFTs:  FEV1 .90 and FVC 1.14. Slightly lower than 9/2019 pfts. In 2019   Her FEV1 was 0.91 - 50% of predicted, FVC 1.24 or 54% of predicted and diffusion capacity 52% of predicted.  All of these are about roughly the same as her last few visits with me.  Of note, she did have a chest CT scan in the  outside (10/2019), which suggests that there has been some mild progression of her pulmonary fibrosis.      ASSESSMENT AND PLAN:  Wanda Kaur 73-year-old woman with interstitial lung disease, the etiology is not clear.  Her CT scan suggestive of fibrotic NSIP and she has previously been diagnosed with seronegative rheumatoid arthritis.  It seems most likely diagnosis is RA related ILD.  IPF remains a remote possibility, but we think that is less likely given that she carries a diagnosis of seronegative rheumatoid arthritis.  In the past she has not tolerated CellCept and prednisone and we had a long discussion regarding these medications and currently she is in favor of not being treated with them.  We also briefly discussed the issue of trying profibrotic agents, either Esbriet or Ofev and she was not interested in those medications either.    Continue with the albuterol inhaler and Serevent, a long-acting bronchodilator. Will give brief burst and taper of Prednisone to see if that helps the cough. If it does can consider either low dose Prednisone or inhaled ICS.  She has been instructed to get the flu shot in October. RTC in 6 months with labs and pfts.     Fernando Goetz MD  Pulmonary/Critical Care  August 12, 2020  Answers for HPI/ROS submitted by the patient on 7/29/2020   General Symptoms: Yes  Skin Symptoms: No  HENT Symptoms: No  EYE SYMPTOMS: No  HEART SYMPTOMS: No  LUNG SYMPTOMS: Yes  INTESTINAL SYMPTOMS: No  URINARY SYMPTOMS: No  GYNECOLOGIC SYMPTOMS: No  BREAST SYMPTOMS: No  SKELETAL SYMPTOMS: Yes  BLOOD SYMPTOMS: No  NERVOUS SYSTEM SYMPTOMS: Yes  MENTAL HEALTH SYMPTOMS: Yes  Fever: No  Loss of appetite: No  Weight loss: No  Weight gain: No  Fatigue: Yes  Night sweats: No  Chills: Yes  Increased stress: No  Excessive hunger: No  Excessive thirst: No  Feeling hot or cold when others believe the temperature is normal: Yes  Loss of height: No  Post-operative complications: No  Surgical site pain:  No  Hallucinations: No  Change in or Loss of Energy: Yes  Hyperactivity: No  Confusion: Yes  Cough: Yes  Sputum or phlegm: Yes  Coughing up blood: No  Difficulty breating or shortness of breath: Yes  Snoring: No  Wheezing: No  Difficulty breathing on exertion: Yes  Nighttime Cough: No  Difficulty breathing when lying flat: No  Back pain: No  Muscle aches: Yes  Neck pain: No  Swollen joints: No  Joint pain: No  Bone pain: No  Muscle cramps: Yes  Muscle weakness: Yes  Joint stiffness: Yes  Bone fracture: No  Trouble with coordination: No  Dizziness or trouble with balance: Yes  Fainting or black-out spells: No  Memory loss: Yes  Headache: No  Seizures: No  Speech problems: No  Tingling: No  Tremor: No  Weakness: Yes  Difficulty walking: No  Paralysis: No  Numbness: No  Nervous or Anxious: No  Depression: Yes  Trouble sleeping: Yes  Trouble thinking or concentrating: No  Mood changes: No  Panic attacks: No

## 2020-08-12 NOTE — LETTER
8/12/2020         RE: Wanda Kaur  [address] [place] [address]        Dear Colleague,    Thank you for referring your patient, Wanda Kaur, to the [facility]. Please see a copy of my visit note below.    HISTORY OF PRESENT ILLNESS:  The patient is a 73-year-old female with interstitial lung disease of unclear etiology, thought to be an ILD related to seronegative rheumatoid arthritis less likely IPF.  In 2016, she had an exacerbation of ILD possibly related to pneumonia and was hospitalized for a prolonged period of time.  In the aftermath of that she was placed on CellCept and prednisone and felt terrible while being on these medications and self-tapered herself off of them. I began following her in 2018.  Her last visit with me was in 03/2019.  Overall, her breathing is about the same.  She thinks her cough is worse.  She has been using an albuterol inhaler which seems to help that in addition to her oxygen therapy.  She uses 3 liters of oxygen at rest and 4-5 liters with activity.     REVIEW OF SYSTEMS   CARDIAC:  She denies exertional chest pain, syncope, dizziness or palpitations.   GASTROINTESTINAL:  She denies reflux or heartburn.   MUSCULOSKELETAL:  She has obvious arthritic changes in her hands but does not think she has felt to have active arthritis in years.      PHYSICAL EXAMINATION:   VITAL SIGNS:  Her O2 sats today were 95% on oxygen.   HEENT:  Eyes are anicteric.  Mouth and throat are without lesions.   NECK:  No adenopathy.   CHEST:  Crackles half way up bilaterally.   HEART:  Regular rate and rhythm, normal S1 and S2.   ABDOMEN:  Overweight, nontender, no hepatosplenomegaly.   EXTREMITIES:  Without clubbing, cyanosis or edema.   MUSCULOSKELETAL:  Chronic arthritic changes in the fingers.  Do not see bogginess or redness of the joints currently.   SKIN:  Some patches of eczema, otherwise negative.          PFTs:  FEV1 .90 and FVC  1.14. Slightly lower than 9/2019 pfts. In 2019   Her FEV1 was 0.91 - 50% of predicted, FVC 1.24 or 54% of predicted and diffusion capacity 52% of predicted.  All of these are about roughly the same as her last few visits with me.  Of note, she did have a chest CT scan in the outside (10/2019), which suggests that there has been some mild progression of her pulmonary fibrosis.      ASSESSMENT AND PLAN:  Wanda Kaur 73-year-old woman with interstitial lung disease, the etiology is not clear.  Her CT scan suggestive of fibrotic NSIP and she has previously been diagnosed with seronegative rheumatoid arthritis.  It seems most likely diagnosis is RA related ILD.  IPF remains a remote possibility, but we think that is less likely given that she carries a diagnosis of seronegative rheumatoid arthritis.  In the past she has not tolerated CellCept and prednisone and we had a long discussion regarding these medications and currently she is in favor of not being treated with them.  We also briefly discussed the issue of trying profibrotic agents, either Esbriet or Ofev and she was not interested in those medications either.    Continue with the albuterol inhaler and Serevent, a long-acting bronchodilator. Will give brief burst and taper of Prednisone to see if that helps the cough. If it does can consider either low dose Prednisone or inhaled ICS.  She has been instructed to get the flu shot in October. RTC in 6 months with labs and pfts.     Fernando Goetz MD  Pulmonary/Critical Care  August 12, 2020  Answers for HPI/ROS submitted by the patient on 7/29/2020   General Symptoms: Yes  Skin Symptoms: No  HENT Symptoms: No  EYE SYMPTOMS: No  HEART SYMPTOMS: No  LUNG SYMPTOMS: Yes  INTESTINAL SYMPTOMS: No  URINARY SYMPTOMS: No  GYNECOLOGIC SYMPTOMS: No  BREAST SYMPTOMS: No  SKELETAL SYMPTOMS: Yes  BLOOD SYMPTOMS: No  NERVOUS SYSTEM SYMPTOMS: Yes  MENTAL HEALTH SYMPTOMS: Yes  Fever: No  Loss of appetite: No  Weight loss:  No  Weight gain: No  Fatigue: Yes  Night sweats: No  Chills: Yes  Increased stress: No  Excessive hunger: No  Excessive thirst: No  Feeling hot or cold when others believe the temperature is normal: Yes  Loss of height: No  Post-operative complications: No  Surgical site pain: No  Hallucinations: No  Change in or Loss of Energy: Yes  Hyperactivity: No  Confusion: Yes  Cough: Yes  Sputum or phlegm: Yes  Coughing up blood: No  Difficulty breating or shortness of breath: Yes  Snoring: No  Wheezing: No  Difficulty breathing on exertion: Yes  Nighttime Cough: No  Difficulty breathing when lying flat: No  Back pain: No  Muscle aches: Yes  Neck pain: No  Swollen joints: No  Joint pain: No  Bone pain: No  Muscle cramps: Yes  Muscle weakness: Yes  Joint stiffness: Yes  Bone fracture: No  Trouble with coordination: No  Dizziness or trouble with balance: Yes  Fainting or black-out spells: No  Memory loss: Yes  Headache: No  Seizures: No  Speech problems: No  Tingling: No  Tremor: No  Weakness: Yes  Difficulty walking: No  Paralysis: No  Numbness: No  Nervous or Anxious: No  Depression: Yes  Trouble sleeping: Yes  Trouble thinking or concentrating: No  Mood changes: No  Panic attacks: No      Again, thank you for allowing me to participate in the care of your patient.        Sincerely,        Fernando Goetz MD

## 2020-08-12 NOTE — NURSING NOTE
Chief Complaint   Patient presents with     Interstitial Lung Disease (ILD)     FIB NSIP     Medications reviewed and updated.  Vitals taken  Kaylah Burns CMA

## 2020-08-17 LAB
DLCOUNC-%PRED-PRE: 43 %
DLCOUNC-PRE: 6.95 ML/MIN/MMHG
DLCOUNC-PRED: 16.08 ML/MIN/MMHG
ERV-%PRED-PRE: 42 %
ERV-PRE: 0.17 L
ERV-PRED: 0.41 L
EXPTIME-PRE: 7.04 SEC
FEF2575-%PRED-PRE: 36 %
FEF2575-PRE: 0.56 L/SEC
FEF2575-PRED: 1.55 L/SEC
FEFMAX-%PRED-PRE: 41 %
FEFMAX-PRE: 1.89 L/SEC
FEFMAX-PRED: 4.59 L/SEC
FEV1-%PRED-PRE: 50 %
FEV1-PRE: 0.9 L
FEV1FEV6-PRE: 72 %
FEV1FEV6-PRED: 79 %
FEV1FVC-PRE: 79 %
FEV1FVC-PRED: 78 %
FEV1SVC-PRE: 90 %
FEV1SVC-PRED: 75 %
FIFMAX-PRE: 2.94 L/SEC
FVC-%PRED-PRE: 50 %
FVC-PRE: 1.14 L
FVC-PRED: 2.26 L
IC-%PRED-PRE: 42 %
IC-PRE: 0.82 L
IC-PRED: 1.93 L
VA-%PRED-PRE: 45 %
VA-PRE: 1.73 L
VC-%PRED-PRE: 42 %
VC-PRE: 0.99 L
VC-PRED: 2.34 L

## 2020-08-24 ENCOUNTER — TELEPHONE (OUTPATIENT)
Dept: PEDIATRICS | Facility: CLINIC | Age: 75
End: 2020-08-24

## 2020-08-24 NOTE — TELEPHONE ENCOUNTER
Symptoms    Describe your symptoms: bloating and abdominal pain     Any pain: Yes: abdominal pain and bloating     How long have you been having symptoms: 1 week  Pt states she has been on a decreasing dose of prednisone and she thinks this may be a side effect from the medication.     Have you been seen for this:  No    Appointment offered?: No    Triage offered?: No    Home remedies tried: n/a     Requested Pharmacy: n/a    Okay to leave a detailed message? Yes at Home number on file [phone] (home)

## 2020-08-24 NOTE — TELEPHONE ENCOUNTER
Called patient.     - For the past few days, she has had a few intermittent mild episodes of discomfort & bloating on her left side. - Also has noticed increase in urination.   - Today has had 5-6 soft stools.   - Started a probiotic 3 days ago. Finished her Prednisone taper yesterday.     Took an imodium today.     Did offer VV or office visit. Patient is ok with monitoring.   Advised to follow up if any change in symptoms.   Reviewed potential for side effects of Prednisone or probiotic or viral.   Advised to hold probiotic for now. Reviewed warnings for imodium use. Advised on Pepto-Bismul if needed.

## 2020-10-07 ENCOUNTER — PATIENT OUTREACH (OUTPATIENT)
Dept: PULMONOLOGY | Facility: CLINIC | Age: 75
End: 2020-10-07

## 2020-10-07 DIAGNOSIS — J84.9 ILD (INTERSTITIAL LUNG DISEASE) (H): Primary | ICD-10-CM

## 2020-10-07 NOTE — PROGRESS NOTES
Patient called to discuss pain the she is having in the upper back on both sides. The pain has been getting worse over the past several days.  She is concerned that something is going on with her lungs.  She has also had intermittent low grade fevers of 100 degrees.  Her breathlessness and cough are unchanged from baseline.  Denies other infectious type symptoms.  Discussed with Dr. Goetz who would like patient to have a chest CT scan and labs.  Patient is in agreement.  She will check with her insurance company first thing tomorrow morning to see if she can have the chest CT scan done here. (Has had issues with them paying for CT scans in the past).

## 2020-10-20 ENCOUNTER — HOSPITAL ENCOUNTER (OUTPATIENT)
Dept: CT IMAGING | Facility: CLINIC | Age: 75
Discharge: HOME | End: 2020-10-20
Attending: INTERNAL MEDICINE | Admitting: INTERNAL MEDICINE
Payer: COMMERCIAL

## 2020-10-20 DIAGNOSIS — J84.9 ILD (INTERSTITIAL LUNG DISEASE) (H): ICD-10-CM

## 2020-10-20 PROCEDURE — 71250 CT THORAX DX C-: CPT

## 2020-10-21 ENCOUNTER — PATIENT OUTREACH (OUTPATIENT)
Dept: PULMONOLOGY | Facility: CLINIC | Age: 75
End: 2020-10-21

## 2020-10-21 NOTE — PROGRESS NOTES
Requested by Dr. Goetz to contact patient with results of chest CT.  CT scan does not show any obvious reason for her back pain.  It does, however, show mild progression of fibrotic lung disease. Per DR. Goetz, discussed starting OFEV with patient.  Patient is not interested at this time. Instructed to follow up with her primary care physician regarding back pain. Patient is in agreement with plan .

## 2020-10-26 ENCOUNTER — TELEPHONE (OUTPATIENT)
Dept: PULMONOLOGY | Facility: CLINIC | Age: 75
End: 2020-10-26

## 2020-10-26 DIAGNOSIS — J84.9 ILD (INTERSTITIAL LUNG DISEASE) (H): ICD-10-CM

## 2020-10-26 LAB
BASOPHILS # BLD AUTO: 0 10E9/L (ref 0–0.2)
BASOPHILS NFR BLD AUTO: 0.5 %
DIFFERENTIAL METHOD BLD: ABNORMAL
EOSINOPHIL # BLD AUTO: 0.3 10E9/L (ref 0–0.7)
EOSINOPHIL NFR BLD AUTO: 4.5 %
ERYTHROCYTE [DISTWIDTH] IN BLOOD BY AUTOMATED COUNT: 12.7 % (ref 10–15)
HCT VFR BLD AUTO: 36.6 % (ref 35–47)
HGB BLD-MCNC: 12 G/DL (ref 11.7–15.7)
LYMPHOCYTES # BLD AUTO: 1 10E9/L (ref 0.8–5.3)
LYMPHOCYTES NFR BLD AUTO: 17.9 %
MCH RBC QN AUTO: 31.7 PG (ref 26.5–33)
MCHC RBC AUTO-ENTMCNC: 32.8 G/DL (ref 31.5–36.5)
MCV RBC AUTO: 97 FL (ref 78–100)
MONOCYTES # BLD AUTO: 0.6 10E9/L (ref 0–1.3)
MONOCYTES NFR BLD AUTO: 10.9 %
NEUTROPHILS # BLD AUTO: 3.8 10E9/L (ref 1.6–8.3)
NEUTROPHILS NFR BLD AUTO: 66.2 %
PLATELET # BLD AUTO: 251 10E9/L (ref 150–450)
RBC # BLD AUTO: 3.78 10E12/L (ref 3.8–5.2)
WBC # BLD AUTO: 5.8 10E9/L (ref 4–11)

## 2020-10-26 PROCEDURE — 86140 C-REACTIVE PROTEIN: CPT | Performed by: INTERNAL MEDICINE

## 2020-10-26 PROCEDURE — 36415 COLL VENOUS BLD VENIPUNCTURE: CPT | Performed by: INTERNAL MEDICINE

## 2020-10-26 PROCEDURE — 80048 BASIC METABOLIC PNL TOTAL CA: CPT | Performed by: INTERNAL MEDICINE

## 2020-10-26 PROCEDURE — 84145 PROCALCITONIN (PCT): CPT | Performed by: INTERNAL MEDICINE

## 2020-10-26 PROCEDURE — 85025 COMPLETE CBC W/AUTO DIFF WBC: CPT | Performed by: INTERNAL MEDICINE

## 2020-10-26 NOTE — TELEPHONE ENCOUNTER
Called patient to have a sooner appointment with Dr. Goetz due to her back pain/respiratory issues, she said her pain had abated considerably since Friday and she is OK with waiting 'till Feb for her next appointment. Re-iterated to patient to give a call to the nurses should issues arise once again ..

## 2020-10-27 LAB
ANION GAP SERPL CALCULATED.3IONS-SCNC: 6 MMOL/L (ref 3–14)
BUN SERPL-MCNC: 16 MG/DL (ref 7–30)
CALCIUM SERPL-MCNC: 8.9 MG/DL (ref 8.5–10.1)
CHLORIDE SERPL-SCNC: 106 MMOL/L (ref 94–109)
CO2 SERPL-SCNC: 28 MMOL/L (ref 20–32)
CREAT SERPL-MCNC: 0.72 MG/DL (ref 0.52–1.04)
CRP SERPL-MCNC: 3.9 MG/L (ref 0–8)
GFR SERPL CREATININE-BSD FRML MDRD: 82 ML/MIN/{1.73_M2}
GLUCOSE SERPL-MCNC: 100 MG/DL (ref 70–99)
POTASSIUM SERPL-SCNC: 4.2 MMOL/L (ref 3.4–5.3)
PROCALCITONIN SERPL-MCNC: <0.05 NG/ML
SODIUM SERPL-SCNC: 140 MMOL/L (ref 133–144)

## 2020-11-23 ENCOUNTER — PATIENT OUTREACH (OUTPATIENT)
Dept: PULMONOLOGY | Facility: CLINIC | Age: 75
End: 2020-11-23

## 2020-11-23 DIAGNOSIS — J84.9 ILD (INTERSTITIAL LUNG DISEASE) (H): Primary | ICD-10-CM

## 2020-11-23 NOTE — PROGRESS NOTES
Patient having increased SOB and low oxygen levels. With activity uses 4 LPM and 3 LPM at night. Dropping to the 70's with minimal activity. Coughing more, always productive and very productive, clear. Taking over the counter cough medication which is helping her sleep. Talking makes her cough more. Instructed her okay to increased oxygen to 5-6 LPM with activity. Message sent to Dr Goetz.

## 2020-11-24 RX ORDER — AZITHROMYCIN 250 MG/1
TABLET, FILM COATED ORAL
Qty: 6 TABLET | Refills: 0 | Status: SHIPPED | OUTPATIENT
Start: 2020-11-24 | End: 2021-05-03

## 2020-11-24 RX ORDER — PREDNISONE 10 MG/1
TABLET ORAL
Qty: 50 TABLET | Refills: 0 | Status: SHIPPED | OUTPATIENT
Start: 2020-11-24 | End: 2021-05-03

## 2020-11-24 NOTE — TELEPHONE ENCOUNTER
Contacted patient to inform of medication orders per Dr. Goetz.   Patient verbalized appreciation for follow up and understanding of dosing described for prednisone burst and zpak.

## 2020-11-24 NOTE — TELEPHONE ENCOUNTER
----- Message from Fernando Goetz MD sent at 11/24/2020 11:39 AM CST -----  Regarding: RE: increased oxygen needs and SOB  I'm OK with a burst of Prednisone. 40 mg for 5 days, 30 mg for 5 days, 20 mg for 5 days, 10 mg for 5 days then off. Also give her a z-gera.    Fernando  ----- Message -----  From: Miya Parker RN  Sent: 11/23/2020   4:46 PM CST  To: Fernando Goetz MD, #  Subject: increased oxygen needs and SOB                   Hi Dr Goetz,    Patient having increased SOB and low oxygen levels. Currently uses 4 LPM with activity and 3 LPM at night. Dropping to the 70's with minimal activity. Coughing more, always productive and more right now, clear production. She is taking over the counter cough medication which is helping her sleep some. Talking makes her cough more. Instructed her okay to increased oxygen to 5-6 LPM with activity. Denies exposure to COVID and no fevers, only see .     She is currently on no therapy but would be okay with Prednisone burst if you thought it would help?    (I'm off the rest of the week, please reply all!)    Muriel

## 2020-12-15 ENCOUNTER — PATIENT OUTREACH (OUTPATIENT)
Dept: PULMONOLOGY | Facility: CLINIC | Age: 75
End: 2020-12-15

## 2020-12-15 DIAGNOSIS — J84.9 ILD (INTERSTITIAL LUNG DISEASE) (H): Primary | ICD-10-CM

## 2020-12-15 RX ORDER — PREDNISONE 5 MG/1
TABLET ORAL
Qty: 28 TABLET | Refills: 0 | Status: SHIPPED | OUTPATIENT
Start: 2020-12-15 | End: 2021-05-03

## 2020-12-15 NOTE — PROGRESS NOTES
We can try an Advair 250/50 inhaler 1 puff per day. Have her stay on 10 of Prednisone for a week longer and then back off to 5 mg/day for 2 weeks. If things get worse she should call us as she will need a work up (labs, chest Ct and pfts)    Fernando Goetz MD    Informed patient, agreed with plan.

## 2020-12-15 NOTE — PROGRESS NOTES
Patient contacted she is nearing the end of her burst, currently on 10 mg for 2 more days and then plan was to stop. Cough is starting to become more frequent again, still clear sputum, less then before. Patient is wondering about inhaler with steriods she could try? Currently using albuterol inhaler 3-4 times a day, helps a little to open her up and clear things. Patient having mood issues with prednisone so would like to get off but concerned about stopping all the way. Denies fever or other symptoms.  Message sent to Dr Goetz.

## 2020-12-18 ENCOUNTER — PATIENT OUTREACH (OUTPATIENT)
Dept: PULMONOLOGY | Facility: CLINIC | Age: 75
End: 2020-12-18

## 2020-12-18 ENCOUNTER — MYC MEDICAL ADVICE (OUTPATIENT)
Dept: PEDIATRICS | Facility: CLINIC | Age: 75
End: 2020-12-18

## 2020-12-18 DIAGNOSIS — J84.9 ILD (INTERSTITIAL LUNG DISEASE) (H): Primary | ICD-10-CM

## 2020-12-18 NOTE — PROGRESS NOTES
Discussed patients symptoms with Dr. Goetz.  He would like patient to come in for testing next week.  CT, PFT's, labs, and EKG ordered.  Patient instructed to be seen in the ER if symptoms worsen.  Patient is in agreement with this plan.

## 2020-12-18 NOTE — PROGRESS NOTES
Contacted patient after  called patient to change appointment time, patient  mentioned that she was hoping to get in sooner due to ongoing symptoms.     Patient states  that her sats are dropping into the 70's with exertion and she is tachycardic with an irregular heart rate.  She has no interest in being seen in the ER.  States that when she sits still she is okay.       Message sent to Dr. Goetz.

## 2020-12-21 NOTE — TELEPHONE ENCOUNTER
Routing to Dr. Ng to advise.     Patient having leg cramps at nighttime.   Hand cramps after gripping things.

## 2020-12-21 NOTE — TELEPHONE ENCOUNTER
A few recommendations: stretching out calves and feet before bed.   There are small foam rollers that work well for this purpose.    I'd also recommend making sure to be well hydrated.    Additionally, there are a few supplements that can be helpful: a B vitamin complex, vitamin E, and magnesium.   She could try them individually to see if any provided benefit.  I would start with the B complex.      Love Ng MD  Internal Medicine - Pediatrics

## 2020-12-28 ENCOUNTER — TELEPHONE (OUTPATIENT)
Dept: PULMONOLOGY | Facility: CLINIC | Age: 75
End: 2020-12-28

## 2020-12-29 NOTE — TELEPHONE ENCOUNTER
Pt is scheduled for LAB, EKG, CT and PFT on 12/30 and virtual visit 1/6 and confirmed. Patient has many questions about insurance and the coverage for the CT, Pt states that her insurance will cover [facility] lower [place] than [facility], I advised patient to inquire about insurance coverage from her insurance, and we as healthcare provider care about her health, pt states that Dr. Goetz prefers CT [place] [facility] [place] but again she has questions about different costs between the two [facility] locations, pt states that she called and spoke with her insurance and billing but no one gave her exact answer. Pt confirmed the scheduling and will contact her insurance again. Pt verbalized understanding and agreement with the plan.

## 2020-12-30 ENCOUNTER — ANCILLARY PROCEDURE (OUTPATIENT)
Dept: CT IMAGING | Facility: CLINIC | Age: 75
End: 2020-12-30
Attending: INTERNAL MEDICINE
Payer: COMMERCIAL

## 2020-12-30 ENCOUNTER — APPOINTMENT (OUTPATIENT)
Dept: LAB | Facility: CLINIC | Age: 75
End: 2020-12-30
Attending: INTERNAL MEDICINE
Payer: COMMERCIAL

## 2020-12-30 DIAGNOSIS — J84.9 ILD (INTERSTITIAL LUNG DISEASE) (H): ICD-10-CM

## 2020-12-30 DIAGNOSIS — J84.9 ILD (INTERSTITIAL LUNG DISEASE) (H): Primary | ICD-10-CM

## 2020-12-30 LAB
ALBUMIN SERPL-MCNC: 3.5 G/DL (ref 3.4–5)
ALP SERPL-CCNC: 82 U/L (ref 40–150)
ALT SERPL W P-5'-P-CCNC: 60 U/L (ref 0–50)
ANION GAP SERPL CALCULATED.3IONS-SCNC: 3 MMOL/L (ref 3–14)
AST SERPL W P-5'-P-CCNC: 31 U/L (ref 0–45)
BASOPHILS # BLD AUTO: 0.1 10E9/L (ref 0–0.2)
BASOPHILS NFR BLD AUTO: 0.6 %
BILIRUB SERPL-MCNC: 0.2 MG/DL (ref 0.2–1.3)
BUN SERPL-MCNC: 19 MG/DL (ref 7–30)
CALCIUM SERPL-MCNC: 9.2 MG/DL (ref 8.5–10.1)
CHLORIDE SERPL-SCNC: 108 MMOL/L (ref 94–109)
CO2 SERPL-SCNC: 32 MMOL/L (ref 20–32)
CREAT SERPL-MCNC: 0.83 MG/DL (ref 0.52–1.04)
CRP SERPL-MCNC: 4.8 MG/L (ref 0–8)
DIFFERENTIAL METHOD BLD: ABNORMAL
EOSINOPHIL # BLD AUTO: 0.1 10E9/L (ref 0–0.7)
EOSINOPHIL NFR BLD AUTO: 0.6 %
ERYTHROCYTE [DISTWIDTH] IN BLOOD BY AUTOMATED COUNT: 13.4 % (ref 10–15)
GFR SERPL CREATININE-BSD FRML MDRD: 69 ML/MIN/{1.73_M2}
GLUCOSE SERPL-MCNC: 103 MG/DL (ref 70–99)
HCT VFR BLD AUTO: 39.4 % (ref 35–47)
HGB BLD-MCNC: 12.3 G/DL (ref 11.7–15.7)
IMM GRANULOCYTES # BLD: 0.2 10E9/L (ref 0–0.4)
IMM GRANULOCYTES NFR BLD: 1.8 %
LYMPHOCYTES # BLD AUTO: 0.6 10E9/L (ref 0.8–5.3)
LYMPHOCYTES NFR BLD AUTO: 4.6 %
MCH RBC QN AUTO: 31.1 PG (ref 26.5–33)
MCHC RBC AUTO-ENTMCNC: 31.2 G/DL (ref 31.5–36.5)
MCV RBC AUTO: 100 FL (ref 78–100)
MONOCYTES # BLD AUTO: 0.6 10E9/L (ref 0–1.3)
MONOCYTES NFR BLD AUTO: 4.4 %
NEUTROPHILS # BLD AUTO: 10.9 10E9/L (ref 1.6–8.3)
NEUTROPHILS NFR BLD AUTO: 88 %
NRBC # BLD AUTO: 0 10*3/UL
NRBC BLD AUTO-RTO: 0 /100
PLATELET # BLD AUTO: 254 10E9/L (ref 150–450)
POTASSIUM SERPL-SCNC: 4.6 MMOL/L (ref 3.4–5.3)
PROT SERPL-MCNC: 7.1 G/DL (ref 6.8–8.8)
RBC # BLD AUTO: 3.95 10E12/L (ref 3.8–5.2)
SODIUM SERPL-SCNC: 143 MMOL/L (ref 133–144)
WBC # BLD AUTO: 12.4 10E9/L (ref 4–11)

## 2020-12-30 PROCEDURE — 94375 RESPIRATORY FLOW VOLUME LOOP: CPT | Performed by: INTERNAL MEDICINE

## 2020-12-30 PROCEDURE — 86140 C-REACTIVE PROTEIN: CPT | Performed by: PATHOLOGY

## 2020-12-30 PROCEDURE — 71250 CT THORAX DX C-: CPT | Performed by: RADIOLOGY

## 2020-12-30 PROCEDURE — 85025 COMPLETE CBC W/AUTO DIFF WBC: CPT | Performed by: PATHOLOGY

## 2020-12-30 PROCEDURE — 94729 DIFFUSING CAPACITY: CPT | Performed by: INTERNAL MEDICINE

## 2020-12-30 PROCEDURE — 80053 COMPREHEN METABOLIC PANEL: CPT | Performed by: PATHOLOGY

## 2020-12-30 PROCEDURE — 36415 COLL VENOUS BLD VENIPUNCTURE: CPT | Performed by: PATHOLOGY

## 2021-01-01 ENCOUNTER — TELEPHONE (OUTPATIENT)
Dept: PEDIATRICS | Facility: CLINIC | Age: 76
End: 2021-01-01
Payer: COMMERCIAL

## 2021-01-01 ENCOUNTER — TRANSFERRED RECORDS (OUTPATIENT)
Dept: HEALTH INFORMATION MANAGEMENT | Facility: CLINIC | Age: 76
End: 2021-01-01
Payer: COMMERCIAL

## 2021-01-01 ENCOUNTER — HEALTH MAINTENANCE LETTER (OUTPATIENT)
Age: 76
End: 2021-01-01

## 2021-01-01 ENCOUNTER — IMMUNIZATION (OUTPATIENT)
Dept: NURSING | Facility: CLINIC | Age: 76
End: 2021-01-01
Payer: COMMERCIAL

## 2021-01-01 ENCOUNTER — VIRTUAL VISIT (OUTPATIENT)
Dept: PEDIATRICS | Facility: CLINIC | Age: 76
End: 2021-01-01
Payer: COMMERCIAL

## 2021-01-01 ENCOUNTER — TELEPHONE (OUTPATIENT)
Dept: PEDIATRICS | Facility: CLINIC | Age: 76
End: 2021-01-01

## 2021-01-01 DIAGNOSIS — T45.1X5A METHOTREXATE LUNG: ICD-10-CM

## 2021-01-01 DIAGNOSIS — Z99.81 OXYGEN DEPENDENT: ICD-10-CM

## 2021-01-01 DIAGNOSIS — J98.4 METHOTREXATE LUNG: ICD-10-CM

## 2021-01-01 DIAGNOSIS — J84.9 ILD (INTERSTITIAL LUNG DISEASE) (H): Primary | ICD-10-CM

## 2021-01-01 DIAGNOSIS — J96.11 CHRONIC RESPIRATORY FAILURE WITH HYPOXIA (H): ICD-10-CM

## 2021-01-01 PROCEDURE — 99213 OFFICE O/P EST LOW 20 MIN: CPT | Mod: 95 | Performed by: PEDIATRICS

## 2021-01-01 PROCEDURE — 0003A PR COVID VAC PFIZER DIL RECON 30 MCG/0.3 ML IM: CPT

## 2021-01-01 PROCEDURE — 91300 PR COVID VAC PFIZER DIL RECON 30 MCG/0.3 ML IM: CPT

## 2021-01-01 RX ORDER — PREDNISONE 10 MG/1
10 TABLET ORAL DAILY
Qty: 50 TABLET | Refills: 0 | Status: SHIPPED | OUTPATIENT
Start: 2021-01-01 | End: 2022-01-01

## 2021-01-01 RX ORDER — INFLUENZA A VIRUS A/MICHIGAN/45/2015 X-275 (H1N1) ANTIGEN (FORMALDEHYDE INACTIVATED), INFLUENZA A VIRUS A/SINGAPORE/INFIMH-16-0019/2016 IVR-186 (H3N2) ANTIGEN (FORMALDEHYDE INACTIVATED), INFLUENZA B VIRUS B/PHUKET/3073/2013 ANTIGEN (FORMALDEHYDE INACTIVATED), AND INFLUENZA B VIRUS B/MARYLAND/15/2016 BX-69A ANTIGEN (FORMALDEHYDE INACTIVATED) 60; 60; 60; 60 UG/.7ML; UG/.7ML; UG/.7ML; UG/.7ML
INJECTION, SUSPENSION INTRAMUSCULAR
COMMUNITY
Start: 2021-01-01

## 2021-01-04 LAB
DLCOUNC-%PRED-PRE: 37 %
DLCOUNC-PRE: 6.04 ML/MIN/MMHG
DLCOUNC-PRED: 16.02 ML/MIN/MMHG
ERV-%PRED-PRE: 26 %
ERV-PRE: 0.09 L
ERV-PRED: 0.35 L
EXPTIME-PRE: 5.5 SEC
FEF2575-%PRED-PRE: 102 %
FEF2575-PRE: 1.56 L/SEC
FEF2575-PRED: 1.52 L/SEC
FEFMAX-%PRED-PRE: 80 %
FEFMAX-PRE: 3.63 L/SEC
FEFMAX-PRED: 4.51 L/SEC
FEV1-%PRED-PRE: 51 %
FEV1-PRE: 0.9 L
FEV1FEV6-PRE: 88 %
FEV1FEV6-PRED: 78 %
FEV1FVC-PRE: 89 %
FEV1FVC-PRED: 78 %
FEV1SVC-PRE: 122 %
FEV1SVC-PRED: 75 %
FIFMAX-PRE: 3.03 L/SEC
FVC-%PRED-PRE: 45 %
FVC-PRE: 1.01 L
FVC-PRED: 2.24 L
IC-%PRED-PRE: 32 %
IC-PRE: 0.65 L
IC-PRED: 1.97 L
INTERPRETATION ECG - MUSE: NORMAL
VA-%PRED-PRE: 45 %
VA-PRE: 1.7 L
VC-%PRED-PRE: 31 %
VC-PRE: 0.74 L
VC-PRED: 2.32 L

## 2021-01-06 ENCOUNTER — VIRTUAL VISIT (OUTPATIENT)
Dept: PULMONOLOGY | Facility: CLINIC | Age: 76
End: 2021-01-06
Attending: INTERNAL MEDICINE
Payer: COMMERCIAL

## 2021-01-06 DIAGNOSIS — J84.9 ILD (INTERSTITIAL LUNG DISEASE) (H): Primary | ICD-10-CM

## 2021-01-06 PROCEDURE — 99214 OFFICE O/P EST MOD 30 MIN: CPT | Mod: 95 | Performed by: INTERNAL MEDICINE

## 2021-01-06 RX ORDER — BENZONATATE 200 MG/1
200 CAPSULE ORAL 3 TIMES DAILY PRN
Qty: 120 CAPSULE | Refills: 3 | Status: SHIPPED | OUTPATIENT
Start: 2021-01-06 | End: 2021-05-03

## 2021-01-06 NOTE — PROGRESS NOTES
Wanda Kaur is a 75 year old female who is being evaluated via a billable video visit.      How would you like to obtain your AVS? [employer]  If the video visit is dropped, the invitation should be resent by: Other e-mail: [email or web address]  Will anyone else be joining your video visit? No                                           Video-Visit Details    Type of service:  Video Visit    Video End Time: 10:45    Total video time was 25 minutes    Distant Location (provider location):  [facility] LUNG [facility]ENCE [facility]         HISTORY OF PRESENT ILLNESS:  The patient is a 73-year-old female with interstitial lung disease of unclear etiology, thought to be an ILD related to seronegative rheumatoid arthritis less likely IPF.  In 2016, she had an exacerbation of ILD possibly related to pneumonia and was hospitalized for a prolonged period of time.  In the aftermath of that she was placed on CellCept and prednisone and felt terrible while being on these medications and self-tapered herself off of them. I began following her in 2018.  Her last visit with me was in August 2020. She has noted increasing dyspnea. Gets SOB with ADL.  Her cough is about the same. The Prednisone burst and taper may have helped slightly.  Talking or activity triggers coughing.  She has been using an albuterol inhaler which seems to help that in addition to her oxygen therapy.  She up to 6-8  liters O2 with activity.     REVIEW OF SYSTEMS   CARDIAC:  She denies exertional chest pain, syncope, dizziness or palpitations.   GASTROINTESTINAL:  She denies reflux or heartburn.   MUSCULOSKELETAL:  She has obvious arthritic changes in her hands but does not think she has felt to have active arthritis in years.      Imaging: Slight progression of ILD compared to 2018 scan.      PFTs:  FEV1 0.90 and FVC 1.01 in Dec 2020.    In August 2020 PFTs were: FEV1 .90 and FVC 1.14. Slightly lower than 9/2019 pfts. In 2019   Her FEV1  was 0.91 - 50% of predicted, FVC 1.24 or 54% of predicted and diffusion capacity 52% of predicted.  All of these are about roughly the same as her last few visits with me.  Of note, she did have a chest CT scan in the outside (10/2019), which suggests that there has been some mild progression of her pulmonary fibrosis.      ASSESSMENT AND PLAN:  Wanda Kaur 73-year-old woman with interstitial lung disease, the etiology is not clear.  Her CT scan suggestive of fibrotic NSIP and she has previously been diagnosed with seronegative rheumatoid arthritis.  It seems most likely diagnosis is RA related ILD.  IPF remains a remote possibility, but we think that is less likely given that she carries a diagnosis of seronegative rheumatoid arthritis.  In the past she has not tolerated CellCept and prednisone and we had a long discussion regarding these medications and currently she is in favor of not being treated with them.  We also briefly discussed the issue of trying profibrotic agents, either Esbriet or Ofev and she was not interested in those medications either.      Continue with the albuterol inhaler and Serevent, a long-acting bronchodilator. And inhaled ICS.  Will try Tessalon Pearls. Patient is interested in going through pulmonary rehab again. Unclear if the programs are active at this time due to the surge in covid. Will call to see if the program is active.  RTC in 4-5 months with 6 MWT.       Fernando Goetz MD  Pulmonary/Critical Care  January 6, 2021 10:19 AM

## 2021-01-08 ENCOUNTER — TELEPHONE (OUTPATIENT)
Dept: PULMONOLOGY | Facility: CLINIC | Age: 76
End: 2021-01-08

## 2021-01-08 NOTE — TELEPHONE ENCOUNTER
----- Message from Fernando Goetz MD sent at 1/8/2021  8:39 AM CST -----  Regarding: RE: Natan Morales,    She could try an over the counter cough suppressant. If she has tried that and it did not help, we could use a cough medicine with codeine in it (Robitussin with codeine) if she wanted to try that. That may need a prescription - the dose would be 1-2 teaspoons every 4-6 hours per day prn cough. The coupon program could also be an option.    Fernando  ----- Message -----  From: Carmen Vidal, RN  Sent: 1/7/2021   3:43 PM CST  To: Fernando Goetz MD, Carmen Vidal, RN  Subject: Ella Cassidy left a voicemail today stating Tessalon is too expensive for her to try; insurance will not cover.   I don't see any other suggestions in your recent note, just thought I would ask before I call her back and ask that she try using GoodRx coupon program.     Thank you,     Carmen

## 2021-01-08 NOTE — TELEPHONE ENCOUNTER
Spoke with patient; discussed coupon program and using OTC cough suppressant. States she has only used Delsym cough suppresant at bedtime and would try during the day if Tessalon remains costly.

## 2021-01-15 ENCOUNTER — HEALTH MAINTENANCE LETTER (OUTPATIENT)
Age: 76
End: 2021-01-15

## 2021-01-21 ENCOUNTER — HOSPITAL ENCOUNTER (OUTPATIENT)
Dept: CARDIAC REHAB | Facility: CLINIC | Age: 76
End: 2021-01-21
Attending: INTERNAL MEDICINE
Payer: COMMERCIAL

## 2021-01-21 DIAGNOSIS — J84.9 ILD (INTERSTITIAL LUNG DISEASE) (H): ICD-10-CM

## 2021-01-21 PROCEDURE — 999N000193 HC STATISTIC VISIT PULM REHAB

## 2021-01-21 PROCEDURE — G0238 OTH RESP PROC, INDIV: HCPCS

## 2021-01-21 PROCEDURE — G0237 THERAPEUTIC PROCD STRG ENDUR: HCPCS

## 2021-01-31 ENCOUNTER — IMMUNIZATION (OUTPATIENT)
Dept: NURSING | Facility: CLINIC | Age: 76
End: 2021-01-31
Payer: COMMERCIAL

## 2021-01-31 PROCEDURE — 91300 PR COVID VAC PFIZER DIL RECON 30 MCG/0.3 ML IM: CPT

## 2021-01-31 PROCEDURE — 0001A PR COVID VAC PFIZER DIL RECON 30 MCG/0.3 ML IM: CPT

## 2021-02-08 ENCOUNTER — HOSPITAL ENCOUNTER (OUTPATIENT)
Dept: CARDIAC REHAB | Facility: CLINIC | Age: 76
End: 2021-02-08
Attending: INTERNAL MEDICINE
Payer: COMMERCIAL

## 2021-02-08 PROCEDURE — 999N000193 HC STATISTIC VISIT PULM REHAB

## 2021-02-08 PROCEDURE — G0237 THERAPEUTIC PROCD STRG ENDUR: HCPCS

## 2021-02-08 PROCEDURE — G0238 OTH RESP PROC, INDIV: HCPCS

## 2021-02-16 ENCOUNTER — HOSPITAL ENCOUNTER (OUTPATIENT)
Dept: CARDIAC REHAB | Facility: CLINIC | Age: 76
End: 2021-02-16
Attending: INTERNAL MEDICINE
Payer: COMMERCIAL

## 2021-02-16 PROCEDURE — 999N000193 HC STATISTIC VISIT PULM REHAB

## 2021-02-16 PROCEDURE — G0239 OTH RESP PROC, GROUP: HCPCS

## 2021-02-21 ENCOUNTER — IMMUNIZATION (OUTPATIENT)
Dept: NURSING | Facility: CLINIC | Age: 76
End: 2021-02-21
Attending: INTERNAL MEDICINE
Payer: COMMERCIAL

## 2021-02-21 PROCEDURE — 0002A PR COVID VAC PFIZER DIL RECON 30 MCG/0.3 ML IM: CPT

## 2021-02-21 PROCEDURE — 91300 PR COVID VAC PFIZER DIL RECON 30 MCG/0.3 ML IM: CPT

## 2021-02-25 ENCOUNTER — TELEPHONE (OUTPATIENT)
Dept: PEDIATRICS | Facility: CLINIC | Age: 76
End: 2021-02-25

## 2021-02-25 DIAGNOSIS — J84.9 ILD (INTERSTITIAL LUNG DISEASE) (H): Primary | ICD-10-CM

## 2021-02-25 NOTE — TELEPHONE ENCOUNTER
Patient calling to request Palliative Care Referral.   She recently has tried Pulm Rehab, but due to lung status, it was not an option she wanted to continue.   She is not interested in a lung transplant.     Due to the progression of her interstitial lung disease, patient would like monitoring of her lung status at home.   She'd like the additional support from palliative care.     Huddled with Dr. Ng - Enter Palliative Care Referral.     Patient has scheduling number to call to set up a consult.

## 2021-05-03 ENCOUNTER — OFFICE VISIT (OUTPATIENT)
Dept: PEDIATRICS | Facility: CLINIC | Age: 76
End: 2021-05-03
Payer: COMMERCIAL

## 2021-05-03 VITALS
RESPIRATION RATE: 20 BRPM | TEMPERATURE: 99 F | DIASTOLIC BLOOD PRESSURE: 68 MMHG | SYSTOLIC BLOOD PRESSURE: 120 MMHG | HEART RATE: 96 BPM | OXYGEN SATURATION: 85 %

## 2021-05-03 DIAGNOSIS — K52.9 CHRONIC DIARRHEA: Primary | ICD-10-CM

## 2021-05-03 DIAGNOSIS — J96.11 CHRONIC RESPIRATORY FAILURE WITH HYPOXIA (H): ICD-10-CM

## 2021-05-03 DIAGNOSIS — J84.9 ILD (INTERSTITIAL LUNG DISEASE) (H): ICD-10-CM

## 2021-05-03 LAB
BASOPHILS # BLD AUTO: 0 10E9/L (ref 0–0.2)
BASOPHILS NFR BLD AUTO: 0.6 %
DIFFERENTIAL METHOD BLD: ABNORMAL
EOSINOPHIL # BLD AUTO: 0.2 10E9/L (ref 0–0.7)
EOSINOPHIL NFR BLD AUTO: 3.4 %
ERYTHROCYTE [DISTWIDTH] IN BLOOD BY AUTOMATED COUNT: 12 % (ref 10–15)
HCT VFR BLD AUTO: 37 % (ref 35–47)
HGB BLD-MCNC: 11.9 G/DL (ref 11.7–15.7)
LYMPHOCYTES # BLD AUTO: 0.9 10E9/L (ref 0.8–5.3)
LYMPHOCYTES NFR BLD AUTO: 13.3 %
MCH RBC QN AUTO: 31.5 PG (ref 26.5–33)
MCHC RBC AUTO-ENTMCNC: 32.2 G/DL (ref 31.5–36.5)
MCV RBC AUTO: 98 FL (ref 78–100)
MONOCYTES # BLD AUTO: 0.6 10E9/L (ref 0–1.3)
MONOCYTES NFR BLD AUTO: 8.2 %
NEUTROPHILS # BLD AUTO: 5.3 10E9/L (ref 1.6–8.3)
NEUTROPHILS NFR BLD AUTO: 74.5 %
PLATELET # BLD AUTO: 243 10E9/L (ref 150–450)
RBC # BLD AUTO: 3.78 10E12/L (ref 3.8–5.2)
WBC # BLD AUTO: 7.1 10E9/L (ref 4–11)

## 2021-05-03 PROCEDURE — 80053 COMPREHEN METABOLIC PANEL: CPT | Performed by: PEDIATRICS

## 2021-05-03 PROCEDURE — 99214 OFFICE O/P EST MOD 30 MIN: CPT | Performed by: PEDIATRICS

## 2021-05-03 PROCEDURE — 85025 COMPLETE CBC W/AUTO DIFF WBC: CPT | Performed by: PEDIATRICS

## 2021-05-03 PROCEDURE — 84443 ASSAY THYROID STIM HORMONE: CPT | Performed by: PEDIATRICS

## 2021-05-03 PROCEDURE — 36415 COLL VENOUS BLD VENIPUNCTURE: CPT | Performed by: PEDIATRICS

## 2021-05-03 NOTE — PATIENT INSTRUCTIONS
Slowly increase your benefiber over time    OK to keep using imodium as needed    Think about using ativan - I'm happy to prescribe this if you think it would be helpful    Let me know if any changes or worsening

## 2021-05-03 NOTE — PROGRESS NOTES
Assessment & Plan       ICD-10-CM    1. Chronic diarrhea  K52.9 CBC with platelets and differential     Comprehensive metabolic panel     TSH with free T4 reflex    Patient with slowly progressive diarrhea.  We discussed today multiple etiologies for this.  Patient believes this is related to changes in her diet and appetite related to her underlying interstitial lung disease.  Plan together to continue to use Imodium as needed.  Patient is found an excellent regimen for this that works for her, and allows her to confidently leave her home.  Our plan will be to slowly increase her fiber intake over time.  Patient already has Benefiber at home to use for this purpose.  She will alert me if this is not improving.     2. ILD (interstitial lung disease) (H)  J84.9  progressive, and increasingly oxygen dependent.  Discussed use of Ativan as a palliative measure for the sensation of dyspnea.  Patient reports that she will consider this and get back to me if she is interested in pursuing it further.  Patient continues to follow with pulmonary medicine.   3. Chronic respiratory failure with hypoxia (H)  J96.11  on 5 L of oxygen at baseline, increasing oxygen needs with activity.       6}     See patient instructions    Return in about 2 weeks (around 5/17/2021), or if symptoms worsen or fail to improve.    Love Ng MD  [facility] TEO Jaramillo is a 75 year old who presents for the following health issues   HPI         Patient presents today to review persistent diarrhea.  She notes the loose stools are becoming much more common.  She does not have explosive diarrhea, but sometimes will have small amounts of fecal incontinence.  Multiple episodes of loose stools earlier in the day are common.  If she takes Imodium, this fixes the problem for the next day or so.  She does not have any blood or mucus in her stools.  There is no associated abdominal pain.  Patient does note that her  appetite is decreasing over time.  Her weight is down about 10 pounds over the last few months.    Patient's interstitial lung disease that is oxygen dependent continues to slowly progress.  She is now been dealing with this illness for about the last 5 years, and is closely followed by pulmonary medicine.  Medications to date have not been particularly helpful.  She does continue to use 2 of her inhalers.  She is albuterol approximately every 2 hours.  She uses oxygen continuously, generally 5 L during the day while at rest, and with activity needs to increase this to 6 to 7 L.  Even with this oxygen support, she gets very short of breath with even limited activity.    Patient has completed her Covid vaccine series.  She and her  are very cautious about their exposures and she is home for most of the time.  She very much enjoys watching PBS, in particular [employer] series.    Patient believes that these are slow changes over time and attributes her loose stools to changes in her appetite, which she reports is not the best.  She is thought about adding a fiber supplement.  In general, she is relatively happy with how her treatment with Imodium goes.        Review of Systems   Constitutional, HEENT, cardiovascular, pulmonary, gi and gu systems are negative, except as otherwise noted.      Objective    /68   Pulse 96   Temp 99  F (37.2  C) (Oral)   Resp 20   LMP  (LMP Unknown)   SpO2 (!) 85%   There is no height or weight on file to calculate BMI.  Oxygen saturations improve into the 90s once patient is rested after walking into the exam room  Physical Exam   GENERAL: alert and mild distress due to recent walking, breathing improves with rest.  RESP: Tachypneic, with some accessory muscle use after walking, that resolves after she has been able to rest.  Some breathlessness with talking for extended periods of time.  Intermittent dry cough.  Coarse crackles at the lung bases, no wheeze, no rhonchi  CV:  regular rate and rhythm, normal S1 S2, no S3 or S4, no murmur, click or rub, no peripheral edema and peripheral pulses strong  ABDOMEN: soft, nontender, no hepatosplenomegaly, no masses and bowel sounds normal  PSYCH: mentation appears normal, affect normal/bright    Lab results pending    Love Ng MD

## 2021-05-04 LAB
ALBUMIN SERPL-MCNC: 3.8 G/DL (ref 3.4–5)
ALP SERPL-CCNC: 68 U/L (ref 40–150)
ALT SERPL W P-5'-P-CCNC: 20 U/L (ref 0–50)
ANION GAP SERPL CALCULATED.3IONS-SCNC: 8 MMOL/L (ref 3–14)
AST SERPL W P-5'-P-CCNC: 17 U/L (ref 0–45)
BILIRUB SERPL-MCNC: 0.2 MG/DL (ref 0.2–1.3)
BUN SERPL-MCNC: 18 MG/DL (ref 7–30)
CALCIUM SERPL-MCNC: 9.2 MG/DL (ref 8.5–10.1)
CHLORIDE SERPL-SCNC: 105 MMOL/L (ref 94–109)
CO2 SERPL-SCNC: 27 MMOL/L (ref 20–32)
CREAT SERPL-MCNC: 0.77 MG/DL (ref 0.52–1.04)
GFR SERPL CREATININE-BSD FRML MDRD: 75 ML/MIN/{1.73_M2}
GLUCOSE SERPL-MCNC: 83 MG/DL (ref 70–99)
POTASSIUM SERPL-SCNC: 4.1 MMOL/L (ref 3.4–5.3)
PROT SERPL-MCNC: 7 G/DL (ref 6.8–8.8)
SODIUM SERPL-SCNC: 140 MMOL/L (ref 133–144)
TSH SERPL DL<=0.005 MIU/L-ACNC: 3.2 MU/L (ref 0.4–4)

## 2021-05-24 DIAGNOSIS — J84.9 ILD (INTERSTITIAL LUNG DISEASE) (H): ICD-10-CM

## 2021-05-24 RX ORDER — LORAZEPAM 0.5 MG/1
0.5 TABLET ORAL 2 TIMES DAILY PRN
Qty: 30 TABLET | Refills: 1 | Status: SHIPPED | OUTPATIENT
Start: 2021-05-24 | End: 2022-01-01

## 2021-05-24 RX ORDER — ALBUTEROL SULFATE 90 UG/1
2 AEROSOL, METERED RESPIRATORY (INHALATION)
Qty: 18 G | Refills: 11 | Status: SHIPPED | OUTPATIENT
Start: 2021-05-24 | End: 2021-05-28

## 2021-05-24 NOTE — TELEPHONE ENCOUNTER
Called the patient and informed her of the medications that were sent to the pharmacy.     Kiara Dyson    May 24, 2021 at 5:55 PM

## 2021-05-24 NOTE — TELEPHONE ENCOUNTER
Reason for Call:  Other prescription    Detailed comments: patient need a prescription for abuteral inhaler x 2 per month rx and the Atavan     Phone Number Patient can be reached at: Home number on file [phone] (home)    Best Time: anytime    Can we leave a detailed message on this number? YES    Call taken on 5/24/2021 at 4:59 PM by Gretta Thao

## 2021-05-24 NOTE — TELEPHONE ENCOUNTER
Called the patient to clarify.  The patient stated that Dr. Ng had mentioned that she could write a prescription for ativan to assist her with restlessness at night.  The patient stated that she is now interested in this and would like a prescription sent to the pharmacy. She also is in need of the albuterol inhalers    Medications pended.    Routing to Dr. Ng to advise.     Kiara Dyson    May 24, 2021 at 5:34 PM

## 2021-05-28 ENCOUNTER — TELEPHONE (OUTPATIENT)
Dept: PEDIATRICS | Facility: CLINIC | Age: 76
End: 2021-05-28

## 2021-05-28 DIAGNOSIS — J84.9 ILD (INTERSTITIAL LUNG DISEASE) (H): ICD-10-CM

## 2021-05-28 RX ORDER — ALBUTEROL SULFATE 90 UG/1
2 AEROSOL, METERED RESPIRATORY (INHALATION)
Qty: 72 G | Refills: 11 | Status: SHIPPED | OUTPATIENT
Start: 2021-05-28

## 2021-05-28 NOTE — TELEPHONE ENCOUNTER
The patient called and left a voicemail on the station phone stating that she went to fill her albuterol (PROAIR HFA/PROVENTIL HFA/VENTOLIN HFA) 108 (90 Base) MCG/ACT inhaler at the pharmacy and they would only fill one inhaler. She stated that she always gets 2 per month because the one inhaler does not last the full month for her.  She is wanting to know if Dr. Ng is able to send a new prescription to the pharmacy for the 2 inhalers?    Routing to PCP to advise.     Kiara Dyson    May 28, 2021 at 7:51 AM

## 2021-05-28 NOTE — TELEPHONE ENCOUNTER
Called the patient and informed her of the new prescription that was sent.     Kiara Dyson    May 28, 2021 at 8:42 AM

## 2021-06-03 ENCOUNTER — TRANSFERRED RECORDS (OUTPATIENT)
Dept: HEALTH INFORMATION MANAGEMENT | Facility: CLINIC | Age: 76
End: 2021-06-03

## 2021-06-08 ENCOUNTER — TELEPHONE (OUTPATIENT)
Dept: PEDIATRICS | Facility: CLINIC | Age: 76
End: 2021-06-08

## 2021-06-08 NOTE — TELEPHONE ENCOUNTER
"Patient calling to report continued problems with loose stools.     Has about 4 loose stools a day. Describes as \"mush\". Rarely has a formed stool.   Denies fever, abdominal pain, bleeding, black stools.     Has been having a poor diet. Not enough fiber.   Takes Imodium occasionally and it helps.     Huddled with Dr. Ng - Ok for patient to take Imodium scheduled and/or add a otc fiber supplement. If she'd like, she could consult with GI.   Do not recommend colonoscopy due to her health.     Informed patient of above. She agrees with plan.   Will add Benefiber first then Imodium if needed.   Will call back if wanting GI consult.   "

## 2021-06-21 ENCOUNTER — TELEPHONE (OUTPATIENT)
Dept: PEDIATRICS | Facility: CLINIC | Age: 76
End: 2021-06-21

## 2021-06-21 NOTE — TELEPHONE ENCOUNTER
Called patient.   She was reviewing that her Healthcare Directive and POLST are scanned into her chart.   Informed they are scanned in.   She will put copies on her fridge.

## 2021-06-21 NOTE — TELEPHONE ENCOUNTER
Received call on PAL VM from patient. Patient has questions regarding ACP and documents on file. Requesting call back. Routing to PAL to call patient back.

## 2021-06-29 DIAGNOSIS — J84.9 ILD (INTERSTITIAL LUNG DISEASE) (H): ICD-10-CM

## 2021-06-29 LAB
6 MIN WALK (FT): 640 FT
6 MIN WALK (M): 195 M
ANION GAP SERPL CALCULATED.3IONS-SCNC: 4 MMOL/L (ref 3–14)
BUN SERPL-MCNC: 11 MG/DL (ref 7–30)
CALCIUM SERPL-MCNC: 9 MG/DL (ref 8.5–10.1)
CHLORIDE SERPL-SCNC: 110 MMOL/L (ref 94–109)
CO2 SERPL-SCNC: 29 MMOL/L (ref 20–32)
CREAT SERPL-MCNC: 0.88 MG/DL (ref 0.52–1.04)
CRP SERPL-MCNC: <2.9 MG/L (ref 0–8)
ERYTHROCYTE [DISTWIDTH] IN BLOOD BY AUTOMATED COUNT: 12.6 % (ref 10–15)
GFR SERPL CREATININE-BSD FRML MDRD: 64 ML/MIN/{1.73_M2}
GLUCOSE SERPL-MCNC: 95 MG/DL (ref 70–99)
HCT VFR BLD AUTO: 39.1 % (ref 35–47)
HGB BLD-MCNC: 12.5 G/DL (ref 11.7–15.7)
MCH RBC QN AUTO: 31.3 PG (ref 26.5–33)
MCHC RBC AUTO-ENTMCNC: 32 G/DL (ref 31.5–36.5)
MCV RBC AUTO: 98 FL (ref 78–100)
PLATELET # BLD AUTO: 236 10E9/L (ref 150–450)
POTASSIUM SERPL-SCNC: 4.6 MMOL/L (ref 3.4–5.3)
RBC # BLD AUTO: 3.99 10E12/L (ref 3.8–5.2)
SODIUM SERPL-SCNC: 143 MMOL/L (ref 133–144)
WBC # BLD AUTO: 6.8 10E9/L (ref 4–11)

## 2021-06-29 PROCEDURE — 80048 BASIC METABOLIC PNL TOTAL CA: CPT | Performed by: PATHOLOGY

## 2021-06-29 PROCEDURE — 94729 DIFFUSING CAPACITY: CPT | Performed by: INTERNAL MEDICINE

## 2021-06-29 PROCEDURE — 94618 PULMONARY STRESS TESTING: CPT | Performed by: INTERNAL MEDICINE

## 2021-06-29 PROCEDURE — 86140 C-REACTIVE PROTEIN: CPT | Performed by: PATHOLOGY

## 2021-06-29 PROCEDURE — 94375 RESPIRATORY FLOW VOLUME LOOP: CPT | Performed by: INTERNAL MEDICINE

## 2021-06-29 PROCEDURE — 36415 COLL VENOUS BLD VENIPUNCTURE: CPT | Performed by: PATHOLOGY

## 2021-06-29 PROCEDURE — 85027 COMPLETE CBC AUTOMATED: CPT | Performed by: PATHOLOGY

## 2021-06-30 ENCOUNTER — VIRTUAL VISIT (OUTPATIENT)
Dept: PULMONOLOGY | Facility: CLINIC | Age: 76
End: 2021-06-30
Attending: INTERNAL MEDICINE
Payer: COMMERCIAL

## 2021-06-30 DIAGNOSIS — R06.00 DYSPNEA: ICD-10-CM

## 2021-06-30 DIAGNOSIS — J84.9 ILD (INTERSTITIAL LUNG DISEASE) (H): Primary | ICD-10-CM

## 2021-06-30 LAB
DLCOUNC-%PRED-PRE: 36 %
DLCOUNC-PRE: 5.9 ML/MIN/MMHG
DLCOUNC-PRED: 16.02 ML/MIN/MMHG
ERV-%PRED-PRE: 51 %
ERV-PRE: 0.17 L
ERV-PRED: 0.33 L
EXPTIME-PRE: 6.17 SEC
FEF2575-%PRED-PRE: 78 %
FEF2575-PRE: 1.2 L/SEC
FEF2575-PRED: 1.52 L/SEC
FEFMAX-%PRED-PRE: 85 %
FEFMAX-PRE: 3.85 L/SEC
FEFMAX-PRED: 4.51 L/SEC
FEV1-%PRED-PRE: 48 %
FEV1-PRE: 0.85 L
FEV1FEV6-PRE: 88 %
FEV1FEV6-PRED: 78 %
FEV1FVC-PRE: 88 %
FEV1FVC-PRED: 78 %
FEV1SVC-PRE: 105 %
FEV1SVC-PRED: 75 %
FIFMAX-PRE: 2.55 L/SEC
FVC-%PRED-PRE: 43 %
FVC-PRE: 0.97 L
FVC-PRED: 2.24 L
IC-%PRED-PRE: 32 %
IC-PRE: 0.64 L
IC-PRED: 1.99 L
VA-%PRED-PRE: 44 %
VA-PRE: 1.67 L
VC-%PRED-PRE: 34 %
VC-PRE: 0.81 L
VC-PRED: 2.32 L

## 2021-06-30 PROCEDURE — 99214 OFFICE O/P EST MOD 30 MIN: CPT | Mod: 95 | Performed by: INTERNAL MEDICINE

## 2021-06-30 NOTE — LETTER
6/30/2021         RE: Wanda Kaur  [address] [place] [address]        Dear Colleague,    Thank you for referring your patient, aWnda Kaur, to the [facility] FOR LUNG SCIENCE AND [facility] [place]. Please see a copy of my visit note below.    SUBJECTIVE:  This was a video visit, the total video time was 30 minutes.  Wanda is a 75-year-old female with interstitial lung disease of unclear etiology, thought to be ILD related to seronegative rheumatoid arthritis, less likely IPF.  Wanda in the past has been treated with CellCept and prednisone.  I also tried Ofev, none of which she tolerated and has not been on any medication.  Her last visit with me was in 01/2021.  Since that time, she feels like her breathing has worsened.  She is using O2 at 5 liters per minute with most activity.  She feels tired, has decreased appetite and says that she has lost a little bit of weight.  She has had no respiratory tract infections since her last visit with me.      REVIEW OF SYSTEMS:  On review of systems she thinks that her arthritis might be a little bit worse and also feeling depressed.    CARDIAC REVIEW OF SYSTEMS:  She denies exertional chest pain, syncope, dizziness or palpitations.    GASTROINTESTINAL:  She denies reflux or heartburn.      For testing, she had labs done.  A CRP was less than 2.9 was normal.  She had a normal CBC and normal basic metabolic panel.  She also had PFTs done, 6-minute walk test, she did the walk on 6 liters per minute.  Lowest sats were 92%, indicating that she needs somewhere in the vicinity of 6 liters of oxygen to keep her O2 sats above 90%.  Her spirometry was basically unchanged compared to 6 months ago.  Her FVC was 0.97, was 1.01 six months ago.  Her FEV1 was 0.85 and it was 0.9 back in December.  Her diffusion capacity was 36% of predicted, which is a little bit lower.      Of note, she denies lower extremity edema.  She  has not had an echocardiogram done recently at all.      ASSESSMENT AND PLAN:  For assessment and plan Wanda is a 75-year-old woman with interstitial lung disease, likely related to seronegative rheumatoid arthritis.  Overall, she seems to be more symptomatic requiring slightly more oxygen, although her PFTs are minimally changed.  My plan will be to obtain an echocardiogram on Wanda.  We will continue with an albuterol inhaler.  She feels like it helps her a little bit.  I did discuss trying low dose Ofev on her.  She was not interested in pursuing that.  In terms of her oxygen, it is recommended that she use 6 liters per minute of oxygen.  She wanted to see if we can try to get her a nasal cannula that was more of a high flow nasal cannula with softer prongs.  I will have Muriel Parker,  ILD Nurse, to work on seeing whether or not we can get her a different nasal cannula.  I plan to see Wanda back in about 4 months or so and as mentioned, we will obtain an echocardiogram.        Fernando Goetz MD  Pulmonary/Critical Care  June 30, 2021 12:34 PM    Addendum: echocardiogram shows normal RV function.    Fernando Goetz

## 2021-06-30 NOTE — PROGRESS NOTES
SUBJECTIVE:  This was a video visit, the total video time was 30 minutes.  Wanda is a 75-year-old female with interstitial lung disease of unclear etiology, thought to be ILD related to seronegative rheumatoid arthritis, less likely IPF.  Wanda in the past has been treated with CellCept and prednisone.  I also tried Ofev, none of which she tolerated and has not been on any medication.  Her last visit with me was in 01/2021.  Since that time, she feels like her breathing has worsened.  She is using O2 at 5 liters per minute with most activity.  She feels tired, has decreased appetite and says that she has lost a little bit of weight.  She has had no respiratory tract infections since her last visit with me.      REVIEW OF SYSTEMS:  On review of systems she thinks that her arthritis might be a little bit worse and also feeling depressed.    CARDIAC REVIEW OF SYSTEMS:  She denies exertional chest pain, syncope, dizziness or palpitations.    GASTROINTESTINAL:  She denies reflux or heartburn.      For testing, she had labs done.  A CRP was less than 2.9 was normal.  She had a normal CBC and normal basic metabolic panel.  She also had PFTs done, 6-minute walk test, she did the walk on 6 liters per minute.  Lowest sats were 92%, indicating that she needs somewhere in the vicinity of 6 liters of oxygen to keep her O2 sats above 90%.  Her spirometry was basically unchanged compared to 6 months ago.  Her FVC was 0.97, was 1.01 six months ago.  Her FEV1 was 0.85 and it was 0.9 back in December.  Her diffusion capacity was 36% of predicted, which is a little bit lower.      Of note, she denies lower extremity edema.  She has not had an echocardiogram done recently at all.      ASSESSMENT AND PLAN:  For assessment and plan Wanda is a 75-year-old woman with interstitial lung disease, likely related to seronegative rheumatoid arthritis.  Overall, she seems to be more symptomatic requiring slightly more oxygen, although  her PFTs are minimally changed.  My plan will be to obtain an echocardiogram on Wanda.  We will continue with an albuterol inhaler.  She feels like it helps her a little bit.  I did discuss trying low dose Ofev on her.  She was not interested in pursuing that.  In terms of her oxygen, it is recommended that she use 6 liters per minute of oxygen.  She wanted to see if we can try to get her a nasal cannula that was more of a high flow nasal cannula with softer prongs.  I will have Muriel Parker,  ILD Nurse, to work on seeing whether or not we can get her a different nasal cannula.  I plan to see Wanda back in about 4 months or so and as mentioned, we will obtain an echocardiogram.        Fernando Goetz MD  Pulmonary/Critical Care  June 30, 2021 12:34 PM    Addendum: echocardiogram shows normal RV function.    Fernando Jaramillo is a 75 year old who is being evaluated via a billable video visit.      How would you like to obtain your AVS? MyChart  If the video visit is dropped, the invitation should be resent by: Other e-mail: [email or web address]  Will anyone else be joining your video visit? No

## 2021-07-14 ENCOUNTER — HOSPITAL ENCOUNTER (OUTPATIENT)
Dept: CARDIOLOGY | Facility: CLINIC | Age: 76
Discharge: HOME | End: 2021-07-14
Attending: INTERNAL MEDICINE | Admitting: INTERNAL MEDICINE
Payer: COMMERCIAL

## 2021-07-14 LAB — LVEF ECHO: NORMAL

## 2021-07-14 PROCEDURE — 255N000002 HC RX 255 OP 636: Performed by: INTERNAL MEDICINE

## 2021-07-14 PROCEDURE — 93306 TTE W/DOPPLER COMPLETE: CPT | Mod: 26 | Performed by: INTERNAL MEDICINE

## 2021-07-14 PROCEDURE — 999N000208 ECHOCARDIOGRAM COMPLETE

## 2021-07-14 RX ADMIN — HUMAN ALBUMIN MICROSPHERES AND PERFLUTREN 3 ML: 10; .22 INJECTION, SOLUTION INTRAVENOUS at 10:59

## 2021-08-06 ENCOUNTER — TELEPHONE (OUTPATIENT)
Dept: PEDIATRICS | Facility: CLINIC | Age: 76
End: 2021-08-06

## 2021-08-06 DIAGNOSIS — R42 DIZZINESS: Primary | ICD-10-CM

## 2021-08-06 NOTE — TELEPHONE ENCOUNTER
Provider left ill, therefore patient's appointment today needs to be rescheduled.  Left message on home and cell phone informing patient of this.  Requested call back to reschedule.  Upon return call, please transfer to station b- [phone].  Additionally sent [employer] message to patient.     Ruby Malagon  Lead

## 2021-08-06 NOTE — TELEPHONE ENCOUNTER
Called and spoke with patient.  Patient states she specifically would like to be seen by a provider that will do vertigo maneuvers.  Discussed with PA if they directly do maneuvers.  Per provider, typically that is done through the [facility].  Patient states she has been seen at the [facility] and would like to go back there for maneuvers.  Patient was transferred directly to their scheduling.     Ruby Malagon  Lead

## 2021-08-09 ENCOUNTER — TELEPHONE (OUTPATIENT)
Dept: PEDIATRICS | Facility: CLINIC | Age: 76
End: 2021-08-09

## 2021-08-09 NOTE — TELEPHONE ENCOUNTER
Received call from pt  She is requesting a referral to the [facility] in [place]  See message below regarding pt's condition  Fax# [phone]    Pt tried to make an appt, but was told she needed a referral first    Referral pended- Please review    Thank you  Ni Hollingsworth RN on 8/9/2021 at 10:03 AM

## 2021-08-09 NOTE — TELEPHONE ENCOUNTER
Call placed to pt with message from provider  Pt verbalized understanding    MA/TC: Please fax referral to [phone]    Thank you  Ni Hollingsworth RN on 8/9/2021 at 10:45 AM

## 2021-08-13 ENCOUNTER — TRANSFERRED RECORDS (OUTPATIENT)
Dept: HEALTH INFORMATION MANAGEMENT | Facility: CLINIC | Age: 76
End: 2021-08-13

## 2021-10-18 NOTE — TELEPHONE ENCOUNTER
Patient called to schedule wellness exam. Scheduled for 1/11/2021.     Patient inquired on how home care or palliative care gets ordered.    Reviewed with home care, we'd need a visit within 30 days of the referral, patient will need to be homebound with a nursing need.    Palliative Care referral was entered earlier this year. She hasn't followed up. Offered to give number. Patient declines at this time.

## 2021-12-29 PROBLEM — R76.8 ANA POSITIVE: Status: ACTIVE | Noted: 2019-04-03

## 2021-12-29 NOTE — PROGRESS NOTES
"Ella is a 76 year old who is being evaluated via a billable video visit.      How would you like to obtain your AVS? MyChart  If the video visit is dropped, the invitation should be resent by: Send to e-mail at: [email or web address]  Will anyone else be joining your video visit? No  {If patient encounters technical issues they should call [phone] :380139}    Video Start Time: {video visit start/end time for provider to select:042026}    {PROVIDER CHARTING PREFERENCE:900403}    Subjective   Ella is a 76 year old who presents for the following health issues:     HPI     Following up on lung disease diagnosis         {SUPERLIST (Optional):425405}  {additonal problems for provider to add (Optional):552247}    Review of Systems   {ROS COMP (Optional):628155}      Objective         Vitals:  No vitals were obtained today due to virtual visit.    Physical Exam   {video visit exam brief selected:835430::\"GENERAL: Healthy, alert and no distress\",\"EYES: Eyes grossly normal to inspection.  No discharge or erythema, or obvious scleral/conjunctival abnormalities.\",\"RESP: No audible wheeze, cough, or visible cyanosis.  No visible retractions or increased work of breathing.  \",\"SKIN: Visible skin clear. No significant rash, abnormal pigmentation or lesions.\",\"NEURO: Cranial nerves grossly intact.  Mentation and speech appropriate for age.\",\"PSYCH: Mentation appears normal, affect normal/bright, judgement and insight intact, normal speech and appearance well-groomed.\"}    {Diagnostic Test Results (Optional):394832}    {AMBULATORY ATTESTATION (Optional):941628}        Video-Visit Details    Type of service:  Video Visit    Video End Time:{video visit start/end time for provider to select:289756}    Originating Location (pt. Location): {video visit patient location:442322::\"Home\"}    Distant Location (provider location):  [facility]     Platform used for Video Visit: {Virtual Visit Platforms:921955::\"[employer]\"}  "

## 2021-12-29 NOTE — PROGRESS NOTES
Ella is a 76 year old who is being evaluated via a billable telephone visit.      What phone number would you like to be contacted at? [phone]  How would you like to obtain your AVS? MyChart    Assessment & Plan       ICD-10-CM    1. ILD (interstitial lung disease) (H)  J84.9 ipratropium-albuterol (COMBIVENT RESPIMAT)  MCG/ACT inhaler     predniSONE (DELTASONE) 10 MG tablet     Palliative Care Referral   2. Oxygen dependent  Z99.81 Palliative Care Referral   3. 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  New referral placed.  We will also plan on a prolonged steroid burst and taper to see if we can help improve the frequent cough that she has been experiencing over the last several weeks.  She has no concern for an infectious process at this time and continues to cough up only clear sputum.  We will also try a transition to use of some Respimat in place of some albuterol dosing to see if the Atrovent makes any difference in her symptom relief.  Asked patient to send me a MOWGLI message in a day or 2 to let me know how she is responding to the steroids.  Patient's  is her primary caregiver.        Love Ng MD  [facility] TEO Jaramillo is a 76 year old who presents for the following health issues:     HPI     Following up on lung disease diagnosis.     Patient called to follow-up on her chronic lung disease.  She has severe interstitial lung disease and is oxygen dependent 24 hours/day.  She notes that her ability to tolerate activity has been  diminishing over time.  She is also been dealing with worsening cough over the last several weeks.  She is using albuterol as needed, on average every 2 hours, and does notice some improvement for a brief period of time after its use.    Using 5 L of oxygen during the day and turns down to 3 to 4 L of oxygen via nasal cannula at night.    Patient is currently off all immunosuppression.  She is open to a trial of steroids again if needed due to severity of her cough.    Her appetite has been decreasing and, even when hungry, she can only eat a couple of bites before she feels full.    Patient did recently have a negative Covid test.          Objective         Vitals:  No vitals were obtained today due to virtual visit.    Physical Exam   mild distress due to breathing, fatigued voice  PSYCH: Alert and oriented times 3; coherent speech, normal   rate and volume, able to articulate logical thoughts, able   to abstract reason, no tangential thoughts, no hallucinations   or delusions  Her affect is normal  RESP: Frequent harsh cough and unable to speak in full sentences due to shortness of breath  Remainder of exam unable to be completed due to telephone visits      Love Ng MD          Phone call duration: 13 minutes

## 2021-12-31 NOTE — TELEPHONE ENCOUNTER
"Patient sent in a [employer] message.   She plans to fill the inhaler in the new year.   Will check in with the patient next week to make sure it filled ok.     \"December 30, 2021    Ella Kaur E  to Me          4:32 PM  I checked with [facility]...and the reason the Combivent inhaler was so expensive,was because of the yearly deductible of $235.00,for tier 3 drugs.Because we are at the end of the year, I will wait until the new year,so I won t have to pay the deductible again so soon. Also ,I think Dr Ng ordered 3 month supply and I would want to start out with 30 day supply until we know how well it would work for me.   Thanks,Ella"

## 2022-01-01 ENCOUNTER — TELEPHONE (OUTPATIENT)
Dept: SPIRITUAL SERVICES | Facility: CLINIC | Age: 77
End: 2022-01-01
Payer: COMMERCIAL

## 2022-01-01 ENCOUNTER — PATIENT OUTREACH (OUTPATIENT)
Dept: CARE COORDINATION | Facility: CLINIC | Age: 77
End: 2022-01-01

## 2022-01-01 ENCOUNTER — TELEPHONE (OUTPATIENT)
Dept: PEDIATRICS | Facility: CLINIC | Age: 77
End: 2022-01-01
Payer: COMMERCIAL

## 2022-01-01 ENCOUNTER — TELEPHONE (OUTPATIENT)
Dept: PEDIATRICS | Facility: CLINIC | Age: 77
End: 2022-01-01

## 2022-01-01 ENCOUNTER — LAB (OUTPATIENT)
Dept: LAB | Facility: CLINIC | Age: 77
End: 2022-01-01
Payer: COMMERCIAL

## 2022-01-01 ENCOUNTER — DOCUMENTATION ONLY (OUTPATIENT)
Dept: OTHER | Facility: CLINIC | Age: 77
End: 2022-01-01
Payer: COMMERCIAL

## 2022-01-01 ENCOUNTER — MEDICAL CORRESPONDENCE (OUTPATIENT)
Dept: HEALTH INFORMATION MANAGEMENT | Facility: CLINIC | Age: 77
End: 2022-01-01

## 2022-01-01 ENCOUNTER — PRE VISIT (OUTPATIENT)
Dept: ONCOLOGY | Facility: CLINIC | Age: 77
End: 2022-01-01

## 2022-01-01 ENCOUNTER — TELEPHONE (OUTPATIENT)
Dept: PALLIATIVE CARE | Facility: CLINIC | Age: 77
End: 2022-01-01

## 2022-01-01 ENCOUNTER — ONCOLOGY VISIT (OUTPATIENT)
Dept: ONCOLOGY | Facility: CLINIC | Age: 77
End: 2022-01-01
Attending: PEDIATRICS
Payer: COMMERCIAL

## 2022-01-01 ENCOUNTER — HOSPITAL ENCOUNTER (EMERGENCY)
Facility: CLINIC | Age: 77
Discharge: HOME | End: 2022-07-11
Attending: EMERGENCY MEDICINE | Admitting: EMERGENCY MEDICINE
Payer: COMMERCIAL

## 2022-01-01 ENCOUNTER — HEALTH MAINTENANCE LETTER (OUTPATIENT)
Age: 77
End: 2022-01-01

## 2022-01-01 ENCOUNTER — PATIENT OUTREACH (OUTPATIENT)
Dept: CARE COORDINATION | Facility: CLINIC | Age: 77
End: 2022-01-01
Payer: COMMERCIAL

## 2022-01-01 ENCOUNTER — NURSE TRIAGE (OUTPATIENT)
Dept: PEDIATRICS | Facility: CLINIC | Age: 77
End: 2022-01-01

## 2022-01-01 ENCOUNTER — VIRTUAL VISIT (OUTPATIENT)
Dept: ONCOLOGY | Facility: CLINIC | Age: 77
End: 2022-01-01
Attending: STUDENT IN AN ORGANIZED HEALTH CARE EDUCATION/TRAINING PROGRAM
Payer: COMMERCIAL

## 2022-01-01 ENCOUNTER — OFFICE VISIT (OUTPATIENT)
Dept: PEDIATRICS | Facility: CLINIC | Age: 77
End: 2022-01-01
Payer: COMMERCIAL

## 2022-01-01 ENCOUNTER — TRANSFERRED RECORDS (OUTPATIENT)
Dept: HEALTH INFORMATION MANAGEMENT | Facility: CLINIC | Age: 77
End: 2022-01-01
Payer: COMMERCIAL

## 2022-01-01 ENCOUNTER — APPOINTMENT (OUTPATIENT)
Dept: GENERAL RADIOLOGY | Facility: CLINIC | Age: 77
End: 2022-01-01
Attending: EMERGENCY MEDICINE
Payer: COMMERCIAL

## 2022-01-01 ENCOUNTER — APPOINTMENT (OUTPATIENT)
Dept: CT IMAGING | Facility: CLINIC | Age: 77
End: 2022-01-01
Attending: EMERGENCY MEDICINE
Payer: COMMERCIAL

## 2022-01-01 ENCOUNTER — OFFICE VISIT (OUTPATIENT)
Dept: OTHER | Facility: CLINIC | Age: 77
End: 2022-01-01
Payer: COMMERCIAL

## 2022-01-01 VITALS
HEART RATE: 110 BPM | HEIGHT: 59 IN | BODY MASS INDEX: 26.13 KG/M2 | RESPIRATION RATE: 18 BRPM | WEIGHT: 129.6 LBS | DIASTOLIC BLOOD PRESSURE: 76 MMHG | OXYGEN SATURATION: 94 % | SYSTOLIC BLOOD PRESSURE: 127 MMHG

## 2022-01-01 VITALS
TEMPERATURE: 98.6 F | BODY MASS INDEX: 26.46 KG/M2 | WEIGHT: 131 LBS | DIASTOLIC BLOOD PRESSURE: 67 MMHG | HEART RATE: 92 BPM | SYSTOLIC BLOOD PRESSURE: 129 MMHG | RESPIRATION RATE: 40 BRPM | OXYGEN SATURATION: 100 %

## 2022-01-01 VITALS
RESPIRATION RATE: 56 BRPM | SYSTOLIC BLOOD PRESSURE: 140 MMHG | HEART RATE: 74 BPM | OXYGEN SATURATION: 100 % | WEIGHT: 133.3 LBS | TEMPERATURE: 98.1 F | HEIGHT: 59 IN | DIASTOLIC BLOOD PRESSURE: 70 MMHG | BODY MASS INDEX: 26.87 KG/M2

## 2022-01-01 VITALS
TEMPERATURE: 99.9 F | HEART RATE: 99 BPM | SYSTOLIC BLOOD PRESSURE: 124 MMHG | OXYGEN SATURATION: 99 % | DIASTOLIC BLOOD PRESSURE: 63 MMHG | RESPIRATION RATE: 60 BRPM

## 2022-01-01 DIAGNOSIS — Z99.81 OXYGEN DEPENDENT: ICD-10-CM

## 2022-01-01 DIAGNOSIS — J96.11 CHRONIC RESPIRATORY FAILURE WITH HYPOXIA (H): ICD-10-CM

## 2022-01-01 DIAGNOSIS — R06.09 DOE (DYSPNEA ON EXERTION): Primary | ICD-10-CM

## 2022-01-01 DIAGNOSIS — J96.11 CHRONIC RESPIRATORY FAILURE WITH HYPOXIA (H): Primary | ICD-10-CM

## 2022-01-01 DIAGNOSIS — J98.4 METHOTREXATE LUNG: ICD-10-CM

## 2022-01-01 DIAGNOSIS — Z23 HIGH PRIORITY FOR 2019-NCOV VACCINE: ICD-10-CM

## 2022-01-01 DIAGNOSIS — Z00.00 ENCOUNTER FOR ANNUAL WELLNESS VISIT (AWV) IN MEDICARE PATIENT: ICD-10-CM

## 2022-01-01 DIAGNOSIS — Z13.6 CARDIOVASCULAR SCREENING; LDL GOAL LESS THAN 160: ICD-10-CM

## 2022-01-01 DIAGNOSIS — J84.9 ILD (INTERSTITIAL LUNG DISEASE) (H): ICD-10-CM

## 2022-01-01 DIAGNOSIS — F32.9 REACTIVE DEPRESSION: ICD-10-CM

## 2022-01-01 DIAGNOSIS — T45.1X5A METHOTREXATE LUNG: ICD-10-CM

## 2022-01-01 DIAGNOSIS — J84.10 PULMONARY FIBROSIS (H): ICD-10-CM

## 2022-01-01 DIAGNOSIS — Z71.89 OTHER SPECIFIED COUNSELING: ICD-10-CM

## 2022-01-01 DIAGNOSIS — J84.9 ILD (INTERSTITIAL LUNG DISEASE) (H): Primary | ICD-10-CM

## 2022-01-01 DIAGNOSIS — R45.89 DIFFICULTY COPING: ICD-10-CM

## 2022-01-01 DIAGNOSIS — M62.838 MUSCLE SPASM: ICD-10-CM

## 2022-01-01 DIAGNOSIS — R06.02 SHORTNESS OF BREATH: ICD-10-CM

## 2022-01-01 DIAGNOSIS — Z00.00 ENCOUNTER FOR MEDICARE ANNUAL WELLNESS EXAM: Primary | ICD-10-CM

## 2022-01-01 DIAGNOSIS — R09.02 HYPOXIA: ICD-10-CM

## 2022-01-01 DIAGNOSIS — M06.00 RHEUMATOID ARTHRITIS WITH NEGATIVE RHEUMATOID FACTOR, INVOLVING UNSPECIFIED SITE (H): ICD-10-CM

## 2022-01-01 DIAGNOSIS — Z51.5 ENCOUNTER FOR PALLIATIVE CARE: ICD-10-CM

## 2022-01-01 LAB
ANION GAP SERPL CALCULATED.3IONS-SCNC: 4 MMOL/L (ref 3–14)
ANION GAP SERPL CALCULATED.3IONS-SCNC: 5 MMOL/L (ref 3–14)
ATRIAL RATE - MUSE: 92 BPM
BASOPHILS # BLD AUTO: 0.1 10E3/UL (ref 0–0.2)
BASOPHILS NFR BLD AUTO: 1 %
BUN SERPL-MCNC: 15 MG/DL (ref 7–30)
BUN SERPL-MCNC: 15 MG/DL (ref 7–30)
CALCIUM SERPL-MCNC: 7.6 MG/DL (ref 8.5–10.1)
CALCIUM SERPL-MCNC: 9.1 MG/DL (ref 8.5–10.1)
CHLORIDE BLD-SCNC: 105 MMOL/L (ref 94–109)
CHLORIDE BLD-SCNC: 112 MMOL/L (ref 94–109)
CHOLEST SERPL-MCNC: 214 MG/DL
CO2 SERPL-SCNC: 27 MMOL/L (ref 20–32)
CO2 SERPL-SCNC: 27 MMOL/L (ref 20–32)
CREAT SERPL-MCNC: 0.81 MG/DL (ref 0.52–1.04)
CREAT SERPL-MCNC: 0.84 MG/DL (ref 0.52–1.04)
CRP SERPL-MCNC: 24 MG/L (ref 0–8)
D DIMER PPP FEU-MCNC: 0.99 UG/ML FEU (ref 0–0.5)
DIASTOLIC BLOOD PRESSURE - MUSE: NORMAL MMHG
EOSINOPHIL # BLD AUTO: 0.3 10E3/UL (ref 0–0.7)
EOSINOPHIL NFR BLD AUTO: 5 %
ERYTHROCYTE [DISTWIDTH] IN BLOOD BY AUTOMATED COUNT: 12 % (ref 10–15)
ERYTHROCYTE [DISTWIDTH] IN BLOOD BY AUTOMATED COUNT: 12.5 % (ref 10–15)
FASTING STATUS PATIENT QL REPORTED: YES
FLUAV RNA SPEC QL NAA+PROBE: NEGATIVE
FLUBV RNA RESP QL NAA+PROBE: NEGATIVE
GFR SERPL CREATININE-BSD FRML MDRD: 72 ML/MIN/1.73M2
GFR SERPL CREATININE-BSD FRML MDRD: 75 ML/MIN/1.73M2
GLUCOSE BLD-MCNC: 100 MG/DL (ref 70–99)
GLUCOSE BLD-MCNC: 84 MG/DL (ref 70–99)
HCT VFR BLD AUTO: 36.4 % (ref 35–47)
HCT VFR BLD AUTO: 38.5 % (ref 35–47)
HDLC SERPL-MCNC: 60 MG/DL
HGB BLD-MCNC: 11.7 G/DL (ref 11.7–15.7)
HGB BLD-MCNC: 12.3 G/DL (ref 11.7–15.7)
HOLD SPECIMEN: NORMAL
IMM GRANULOCYTES # BLD: 0 10E3/UL
IMM GRANULOCYTES NFR BLD: 0 %
INTERPRETATION ECG - MUSE: NORMAL
LDLC SERPL CALC-MCNC: 127 MG/DL
LYMPHOCYTES # BLD AUTO: 0.5 10E3/UL (ref 0.8–5.3)
LYMPHOCYTES NFR BLD AUTO: 8 %
MCH RBC QN AUTO: 31.7 PG (ref 26.5–33)
MCH RBC QN AUTO: 32.6 PG (ref 26.5–33)
MCHC RBC AUTO-ENTMCNC: 31.9 G/DL (ref 31.5–36.5)
MCHC RBC AUTO-ENTMCNC: 32.1 G/DL (ref 31.5–36.5)
MCV RBC AUTO: 101 FL (ref 78–100)
MCV RBC AUTO: 99 FL (ref 78–100)
MONOCYTES # BLD AUTO: 0.5 10E3/UL (ref 0–1.3)
MONOCYTES NFR BLD AUTO: 9 %
NEUTROPHILS # BLD AUTO: 4.7 10E3/UL (ref 1.6–8.3)
NEUTROPHILS NFR BLD AUTO: 77 %
NONHDLC SERPL-MCNC: 154 MG/DL
NRBC # BLD AUTO: 0 10E3/UL
NRBC BLD AUTO-RTO: 0 /100
NT-PROBNP SERPL-MCNC: 206 PG/ML (ref 0–1800)
P AXIS - MUSE: 24 DEGREES
PLATELET # BLD AUTO: 253 10E3/UL (ref 150–450)
PLATELET # BLD AUTO: 277 10E3/UL (ref 150–450)
POTASSIUM BLD-SCNC: 4.1 MMOL/L (ref 3.4–5.3)
POTASSIUM BLD-SCNC: 4.2 MMOL/L (ref 3.4–5.3)
PR INTERVAL - MUSE: 152 MS
QRS DURATION - MUSE: 76 MS
QT - MUSE: 338 MS
QTC - MUSE: 417 MS
R AXIS - MUSE: 8 DEGREES
RBC # BLD AUTO: 3.59 10E6/UL (ref 3.8–5.2)
RBC # BLD AUTO: 3.88 10E6/UL (ref 3.8–5.2)
RSV RNA SPEC NAA+PROBE: NEGATIVE
SARS-COV-2 RNA RESP QL NAA+PROBE: NEGATIVE
SODIUM SERPL-SCNC: 137 MMOL/L (ref 133–144)
SODIUM SERPL-SCNC: 143 MMOL/L (ref 133–144)
SYSTOLIC BLOOD PRESSURE - MUSE: NORMAL MMHG
T AXIS - MUSE: 9 DEGREES
T4 FREE SERPL-MCNC: 0.82 NG/DL (ref 0.76–1.46)
TRIGL SERPL-MCNC: 135 MG/DL
TROPONIN I SERPL HS-MCNC: 4 NG/L
TSH SERPL DL<=0.005 MIU/L-ACNC: 4.33 MU/L (ref 0.4–4)
VENTRICULAR RATE- MUSE: 92 BPM
WBC # BLD AUTO: 6.1 10E3/UL (ref 4–11)
WBC # BLD AUTO: 6.1 10E3/UL (ref 4–11)

## 2022-01-01 PROCEDURE — 91305 COVID-19,PF,PFIZER (12+ YRS): CPT | Performed by: PEDIATRICS

## 2022-01-01 PROCEDURE — 250N000009 HC RX 250: Performed by: EMERGENCY MEDICINE

## 2022-01-01 PROCEDURE — 36415 COLL VENOUS BLD VENIPUNCTURE: CPT | Performed by: EMERGENCY MEDICINE

## 2022-01-01 PROCEDURE — 0054A COVID-19,PF,PFIZER (12+ YRS): CPT | Performed by: PEDIATRICS

## 2022-01-01 PROCEDURE — 99214 OFFICE O/P EST MOD 30 MIN: CPT | Mod: 25 | Performed by: PEDIATRICS

## 2022-01-01 PROCEDURE — 86140 C-REACTIVE PROTEIN: CPT

## 2022-01-01 PROCEDURE — 80048 BASIC METABOLIC PNL TOTAL CA: CPT

## 2022-01-01 PROCEDURE — 96374 THER/PROPH/DIAG INJ IV PUSH: CPT | Mod: 59

## 2022-01-01 PROCEDURE — 85379 FIBRIN DEGRADATION QUANT: CPT | Performed by: EMERGENCY MEDICINE

## 2022-01-01 PROCEDURE — 84443 ASSAY THYROID STIM HORMONE: CPT

## 2022-01-01 PROCEDURE — 99205 OFFICE O/P NEW HI 60 MIN: CPT | Performed by: STUDENT IN AN ORGANIZED HEALTH CARE EDUCATION/TRAINING PROGRAM

## 2022-01-01 PROCEDURE — 99285 EMERGENCY DEPT VISIT HI MDM: CPT | Mod: 25

## 2022-01-01 PROCEDURE — 80061 LIPID PANEL: CPT

## 2022-01-01 PROCEDURE — 93005 ELECTROCARDIOGRAM TRACING: CPT

## 2022-01-01 PROCEDURE — 87637 SARSCOV2&INF A&B&RSV AMP PRB: CPT | Performed by: EMERGENCY MEDICINE

## 2022-01-01 PROCEDURE — 250N000011 HC RX IP 250 OP 636: Performed by: EMERGENCY MEDICINE

## 2022-01-01 PROCEDURE — 85027 COMPLETE CBC AUTOMATED: CPT

## 2022-01-01 PROCEDURE — 83880 ASSAY OF NATRIURETIC PEPTIDE: CPT | Performed by: EMERGENCY MEDICINE

## 2022-01-01 PROCEDURE — C9803 HOPD COVID-19 SPEC COLLECT: HCPCS

## 2022-01-01 PROCEDURE — 71045 X-RAY EXAM CHEST 1 VIEW: CPT

## 2022-01-01 PROCEDURE — 94640 AIRWAY INHALATION TREATMENT: CPT

## 2022-01-01 PROCEDURE — G0463 HOSPITAL OUTPT CLINIC VISIT: HCPCS

## 2022-01-01 PROCEDURE — 84484 ASSAY OF TROPONIN QUANT: CPT | Performed by: EMERGENCY MEDICINE

## 2022-01-01 PROCEDURE — 82310 ASSAY OF CALCIUM: CPT | Performed by: EMERGENCY MEDICINE

## 2022-01-01 PROCEDURE — 99214 OFFICE O/P EST MOD 30 MIN: CPT | Mod: 95 | Performed by: STUDENT IN AN ORGANIZED HEALTH CARE EDUCATION/TRAINING PROGRAM

## 2022-01-01 PROCEDURE — 84439 ASSAY OF FREE THYROXINE: CPT

## 2022-01-01 PROCEDURE — 71275 CT ANGIOGRAPHY CHEST: CPT

## 2022-01-01 PROCEDURE — G0439 PPPS, SUBSEQ VISIT: HCPCS | Performed by: FAMILY MEDICINE

## 2022-01-01 PROCEDURE — 85025 COMPLETE CBC W/AUTO DIFF WBC: CPT | Performed by: EMERGENCY MEDICINE

## 2022-01-01 PROCEDURE — 36415 COLL VENOUS BLD VENIPUNCTURE: CPT

## 2022-01-01 RX ORDER — METHYLPREDNISOLONE SODIUM SUCCINATE 125 MG/2ML
125 INJECTION, POWDER, LYOPHILIZED, FOR SOLUTION INTRAMUSCULAR; INTRAVENOUS ONCE
Status: COMPLETED | OUTPATIENT
Start: 2022-01-01 | End: 2022-01-01

## 2022-01-01 RX ORDER — PREDNISONE 10 MG/1
TABLET ORAL
Qty: 51 TABLET | Refills: 0 | Status: SHIPPED | OUTPATIENT
Start: 2022-01-01 | End: 2022-01-01

## 2022-01-01 RX ORDER — PREDNISONE 10 MG/1
TABLET ORAL
Qty: 100 TABLET | Refills: 0 | Status: SHIPPED | OUTPATIENT
Start: 2022-01-01 | End: 2022-01-01

## 2022-01-01 RX ORDER — MORPHINE SULFATE 10 MG/5ML
5 SOLUTION ORAL EVERY 6 HOURS PRN
Qty: 100 ML | Refills: 0 | Status: SHIPPED | OUTPATIENT
Start: 2022-01-01

## 2022-01-01 RX ORDER — PREDNISONE 10 MG/1
TABLET ORAL
Qty: 30 TABLET | Refills: 0 | Status: SHIPPED | OUTPATIENT
Start: 2022-01-01 | End: 2022-01-01

## 2022-01-01 RX ORDER — PREDNISONE 10 MG/1
10 TABLET ORAL DAILY
Qty: 30 TABLET | Refills: 0 | Status: SHIPPED | OUTPATIENT
Start: 2022-01-01 | End: 2022-01-01

## 2022-01-01 RX ORDER — PREDNISONE 10 MG/1
20 TABLET ORAL DAILY
Qty: 28 TABLET | Refills: 0 | Status: SHIPPED | OUTPATIENT
Start: 2022-01-01 | End: 2022-01-01

## 2022-01-01 RX ORDER — LORAZEPAM 0.5 MG/1
0.5 TABLET ORAL 2 TIMES DAILY PRN
Qty: 60 TABLET | Refills: 1 | Status: SHIPPED | OUTPATIENT
Start: 2022-01-01 | End: 2022-01-01

## 2022-01-01 RX ORDER — PREDNISONE 10 MG/1
TABLET ORAL
Qty: 30 TABLET | Refills: 0 | Status: SHIPPED | OUTPATIENT
Start: 2022-01-01

## 2022-01-01 RX ORDER — TIZANIDINE 2 MG/1
2 TABLET ORAL 3 TIMES DAILY PRN
Qty: 60 TABLET | Refills: 3 | Status: SHIPPED | OUTPATIENT
Start: 2022-01-01

## 2022-01-01 RX ORDER — IOPAMIDOL 755 MG/ML
500 INJECTION, SOLUTION INTRAVASCULAR ONCE
Status: COMPLETED | OUTPATIENT
Start: 2022-01-01 | End: 2022-01-01

## 2022-01-01 RX ORDER — IPRATROPIUM BROMIDE AND ALBUTEROL SULFATE 2.5; .5 MG/3ML; MG/3ML
3 SOLUTION RESPIRATORY (INHALATION) ONCE
Status: COMPLETED | OUTPATIENT
Start: 2022-01-01 | End: 2022-01-01

## 2022-01-01 RX ORDER — MORPHINE SULFATE 10 MG/5ML
2.5-5 SOLUTION ORAL EVERY 6 HOURS PRN
Qty: 100 ML | Refills: 0 | Status: SHIPPED | OUTPATIENT
Start: 2022-01-01 | End: 2022-01-01

## 2022-01-01 RX ORDER — PREDNISONE 5 MG/1
5 TABLET ORAL DAILY
COMMUNITY
End: 2022-01-01

## 2022-01-01 RX ORDER — LORAZEPAM 0.5 MG/1
0.5 TABLET ORAL 2 TIMES DAILY PRN
Qty: 60 TABLET | Refills: 1 | Status: SHIPPED | OUTPATIENT
Start: 2022-01-01

## 2022-01-01 RX ADMIN — IOPAMIDOL 61 ML: 755 INJECTION, SOLUTION INTRAVENOUS at 17:55

## 2022-01-01 RX ADMIN — SODIUM CHLORIDE 84 ML: 9 INJECTION, SOLUTION INTRAVENOUS at 17:56

## 2022-01-01 RX ADMIN — METHYLPREDNISOLONE SODIUM SUCCINATE 125 MG: 125 INJECTION, POWDER, FOR SOLUTION INTRAMUSCULAR; INTRAVENOUS at 15:28

## 2022-01-01 RX ADMIN — IPRATROPIUM BROMIDE AND ALBUTEROL SULFATE 3 ML: .5; 3 SOLUTION RESPIRATORY (INHALATION) at 15:28

## 2022-01-01 ASSESSMENT — ENCOUNTER SYMPTOMS
FEVER: 0
SHORTNESS OF BREATH: 1
COUGH: 1
HEADACHES: 1

## 2022-01-01 ASSESSMENT — PATIENT HEALTH QUESTIONNAIRE - PHQ9
SUM OF ALL RESPONSES TO PHQ QUESTIONS 1-9: 5
SUM OF ALL RESPONSES TO PHQ QUESTIONS 1-9: 5
10. IF YOU CHECKED OFF ANY PROBLEMS, HOW DIFFICULT HAVE THESE PROBLEMS MADE IT FOR YOU TO DO YOUR WORK, TAKE CARE OF THINGS AT HOME, OR GET ALONG WITH OTHER PEOPLE: NOT DIFFICULT AT ALL
SUM OF ALL RESPONSES TO PHQ QUESTIONS 1-9: 5

## 2022-01-01 ASSESSMENT — ACTIVITIES OF DAILY LIVING (ADL)
CURRENT_FUNCTION: SHOPPING REQUIRES ASSISTANCE
CURRENT_FUNCTION: LAUNDRY REQUIRES ASSISTANCE
CURRENT_FUNCTION: HOUSEWORK REQUIRES ASSISTANCE
CURRENT_FUNCTION: TRANSPORTATION REQUIRES ASSISTANCE

## 2022-01-01 ASSESSMENT — PAIN SCALES - GENERAL
PAINLEVEL: NO PAIN (0)
PAINLEVEL: NO PAIN (0)

## 2022-01-01 ASSESSMENT — MIFFLIN-ST. JEOR: SCORE: 983.49

## 2022-01-13 NOTE — TELEPHONE ENCOUNTER
Patient called stating that she is attempted to schedule a palliative care visit. She was able to schedule an appointment at 8am but that is too early in the morning for her. She struggles getting up at moving by 8am and she would have to drive to [place] to be seen and that is far for her. They had offered patient a virtual visit but patient has struggled with virtual visits in the past. Patient would be open to a phone visit.     Requested patient call palliative care and see if the would be able to do a phone visit with patient. Patient agree's to plan.     Linda Garrison RN on 1/13/2022 at 10:08 AM

## 2022-01-14 NOTE — TELEPHONE ENCOUNTER
Spoke with patient.   States she filled the Combivent without issues.     She feels that it has been helping. Using the rescue inhaler less.     Will continue and update us prn.

## 2022-01-14 NOTE — PROGRESS NOTES
RECORDS STATUS - ALL OTHER DIAGNOSIS      RECORDS RECEIVED FROM: [facility]   DATE RECEIVED: 1/20/2022   NOTES STATUS DETAILS   OFFICE NOTE from referring provider Complete Epic   Love Ng MD   OFFICE NOTE from medical oncologist Complete  8/12/2016 - Oncology Visit- ILD (interstitial lung disease) (H)      DISCHARGE SUMMARY from hospital Complete 6/2/2016 Mild intermittent Asthma    5/26/2016 Mild intermittent Asthma    3/24/2015   THORACOSCOPIC WEDGE RESECTION LUNG - RIGHT  VIDEO ASSISTED THORASCOPIC WEDGED RESECTION OF MEDIASTINAL MASS   DISCHARGE REPORT from the ER Complete 10/15/2019 Chest Wall Pain    OPERATIVE REPORT Complete Epic 6/29/2021 General PFT    MEDICATION LIST Complete [facility]   CLINICAL TRIAL TREATMENTS TO DATE     LABS     PATHOLOGY REPORTS Complete 3/24/2015   THORACOSCOPIC WEDGE RESECTION LUNG - RIGHT  VIDEO ASSISTED THORASCOPIC WEDGED RESECTION OF MEDIASTINAL MASS   ANYTHING RELATED TO DIAGNOSIS Complete Labs last updated on 6/30/2021   GENONOMIC TESTING     TYPE:     IMAGING (NEED IMAGES & REPORT)     CT SCANS Complete CT Chest 12/30/2020, 10/20/2020    CT Chest Pulmonary 10/15/2019     CT Abdomen Pelvis 8/19/2019    More in PACS   Xray chest Complete 5/2/2018 more in PACS   MRI     MAMMO     ULTRASOUND     PET

## 2022-01-24 NOTE — TELEPHONE ENCOUNTER
Received call from pt  She is short 2 pills for her prednisone taper  She lost one pill and accidentally took too many on a different day    Does she need more pills or is this okay?    Routing to PCP for advisement    Thank you  Ni Hollingsworth RN on 1/24/2022 at 4:07 PM

## 2022-01-26 NOTE — TELEPHONE ENCOUNTER
Thank you so much for the update.   Ok to hold off on any more prednisone for now as long as symptoms don't progress.   I'd recommend waiting to see what palliative medicine has to offer on 2/3 before we think about any additional referrals.      Love Ng MD

## 2022-01-28 NOTE — PROGRESS NOTES
RECORDS STATUS - ALL OTHER DIAGNOSIS      RECORDS RECEIVED FROM: [facility]   DATE RECEIVED: 2/3/2022   NOTES STATUS DETAILS   OFFICE NOTE from referring provider Complete Epic   Love Ng MD   OFFICE NOTE from medical oncologist Complete  8/12/2016 Oncology Visit- ILD (interstitial lung disease) (H)     More in EPIC   DISCHARGE SUMMARY from hospital N/A    DISCHARGE REPORT from the ER     OPERATIVE REPORT Complete Epic 6/29/2021 General PFT more in EPIC   MEDICATION LIST Complete [facility]   CLINICAL TRIAL TREATMENTS TO DATE     LABS     PATHOLOGY REPORTS     ANYTHING RELATED TO DIAGNOSIS Complete Labs last updated on 6/30/2021   GENONOMIC TESTING     TYPE:     IMAGING (NEED IMAGES & REPORT)     CT SCANS Complete CT Chest 12/30/2020, 10/20/2020 more in PACS    MRI     MAMMO     ULTRASOUND     PET

## 2022-02-02 NOTE — PROGRESS NOTES
"Palliative Care Outpatient Clinic Consultation Note    Patient: Wanda Kaur    Chief Complaint/Patient ID:   77 yo F with severe interstitial lung disease thought to be 2/2 seronegative rheumatoid arthritis. Currently not on immunosuppression- previously tried Ofev and Cellcept but she did not tolerate so has been off medication since 2016.  Was following with Pulmonology in the past. She is oxygen dependent 24 hours/day.  She notes that her ability to tolerate activity has been diminishing over time.     When she was about age 70, she was on the transplant list \"for my kids\" for a bit.  She also states that a couple years ago she was enrolled with hospice for between 1 to 2 years, but then she graduated.    HPI:   During our visit, she was quite short of breath after walking up from the garage.  She notes her portable concentrator is \"on demand\", so she has to play catch up a lot more than when she is on her continuous flow at home.  Uses up 5L O2 during day, 4L at night. Over the last year, she has noticed significant increase in SOB with activity.  With very minimal actions such as getting undressed, her sats will drop to the high 70s, then recover with rest.  She notes that at baseline, she tends to have a respiratory rate between 30 to 40 breaths/min.  Had been on a prednisone taper for her breathing and cough recently and it did help but has started to notice symptoms return after stopping. Prefers to not be on steroids if can be avoided due to side effects.    Was previously on morphine when she was on hospice, however she only used it intermittently.  Her PCP gave her a prescription for Ativan which she uses periodically and finds some benefit from it.  She takes 0.5 to 1 mg.    She reports extreme fatigue as well as soreness, particularly in her back muscles.  Sometimes her  will give her back massage which does help.  She reports a 20 pound weight loss over the last year, however when she " "was on the prednisone recently she gained back about 45 pounds.  She has low appetite at baseline as well as some queasiness.    She reports that she sleeps decently.  She sleeps in her own bed, as all of her appointments and her 's snoring made it easier for them to sleep and set her events for the past few years.    She does think she has been depressed for some time.  She went through a phase a long time ago where it sounds like she had some passive suicidal ideation, such as thinking it would just be easier if everything soft.  She denies these thoughts anymore, however she continues to have some low mood.  She reports that her  is wonderful with the more \"practical\" tasks around the house such as chores and transportation, however he is not emotionally supportive for her    Social History  , lives with her .  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  Social History     Tobacco Use     Smoking status: Never Smoker     Smokeless tobacco: Never Used     Tobacco comment: smoked very briefly as a teen   Substance Use Topics     Alcohol use: Yes     Alcohol/week: 1.0 standard drink     Types: 1 Standard drinks or equivalent per week     Comment: 1 glass of wine daily     Drug use: No     Advance Care Planning: HCD on file, scanned in chart. Names spouse and son as HCAs.  POLST form on file states DNR/DNI-selective treatment.  We discussed this today, and she now reports a desire to avoid hospitalization if possible and focus on comfort at home.  Based on this, we updated her POLST form to be DNR/DNI-comfort focused care.    Family History- Reviewed in Epic.     Allergies   Allergen Reactions     No Known Drug Allergies        Medications- Reviewed in Epic.    Past Medical History- Reviewed in [facility].    Past Surgical History- Reviewed in Epic.    REVIEW OF SYSTEMS:   ROS: 10 point ROS neg other than the symptoms noted above in the HPI.      Physical Exam:  /76   Pulse 110   Resp 18   Ht 1.499 m " "(4' 11\")   Wt 58.8 kg (129 lb 9.6 oz)   LMP  (LMP Unknown)   SpO2 94%   BMI 26.18 kg/m    Constitutional: Alert, pleasant, mild respiratory distress  HEENT: No head abnormalities. Sclera non-icteric, no nasal discharge. Nasal cannula in place. MMM. Ears grossly normal.  Neck: Neck supple.   Respiratory: Rapid respirations with some accessory muscle use.  Speaking in short sentences.  Musculoskeletal: Extremities normal- no gross deformities noted. No edema. Normal muscle tone/bulk.  Skin: No suspicious lesions or rashes on limited skin exam.  Neurologic: Gait normal. Grossly normal with intact sensation bilaterally.  Psychiatric: Mentation appears normal, affect normal/bright. Does not appear anxious or depressed.    Data Reviewed:  LABS: 06/29/21- Cr 0.88, GFR 64. WBC 6.8, Hgb 12.5, Plts 236.    [place]  reviewed and fills consistent with history: Yes, only a couple Rxs for ativan from PCP in last year.    Impressions, Recommendations, & Counseling:  Wanda Kaur is a 76 year old female with history of ILD.    ILD  [place] - Has been worsening over the last several years.  Significant shortness of breath with very minimal activity.  Has not tolerated amiodarone suppressive medicines in the past, though does find benefit from the Combivent inhaler.  -We discussed the role of morphine in dyspnea management and she was open to trying this.  We will start 2.5 to 5 mg Q6H PRN.  -Discussed spacing morphine and Ativan out by 3 hours.  Sent prescription for Narcan as well just in case.  -Also recommended bedside and hand-held fans, as the air blowing on the face can also help with shortness of breath.    Depression  Difficulty coping - Mood has been lower, particularly related to all of her health struggles with her breathing.  She has never been on any antidepressants in the past.  She is uncertain if she wants to consider this now, but she is open to referral to palliative care social work for additional emotional " support and coping.  -Referral placed to [facility] today.  -Could consider medication for mood in the future if she thinks it would be helpful.  My first choice would be mirtazapine as this could also help with her lower appetite.    ACP  GOC - Confirmed that her  would be her HCA as indicated by her healthcare directive on file.  We discussed code status today, and she now reports a desire to avoid hospitalization if possible and focus on comfort at home.  Based on this, we updated her POLST form to be DNR/DNI-comfort focused care.  She is not quite certain she is ready to work with hospice again, however offered in the future that at any time informational meeting could be set up.  For now, our goal will be trying to improve her shortness of breath and have her meet with palliative care social work to try to benefit her quality of life and go from there.    Follow up: We will attempt a virtual follow-up in about 3 months.  Discussed we could try Doximity, however since we met face-to-face visit could be conducted over the telephone.    Total time spent on day of encounter is 84 mins, including reviewing record, review of above studies, above visit with patient (FTF 3:21 PM-4:06 PM), symptomatic discussion of shortness of breath and mood, including medication adjustments/prescription management, goals of care conversation including updating POLST form, and documentation.     Lissa Gregorio, DO  Palliative Medicine   Pager [phone], AMCOM ID [id number]      Some chart documentation performed using Dragon Voice recognition Software. Although reviewed after completion, some words and grammatical errors may remain.

## 2022-02-03 NOTE — PATIENT INSTRUCTIONS
Recommendations:  -Start taking the morphine 2.5-5mg (1.25-2.5mL) up to 4 times daily as needed for shortness of breath. Make sure to separate from the ativan by at least 3 hours.  -I recommend looking into handheld/table top fans, as the air blowing on your face can also help with shortness of breath.  -I am placing a referral to the palliative care clinical  for some additional coping/emotional support.  -We updated your POLST form today to reflect your desire for DNR/DNI-Comfort focused care.    Follow up: 3 months      Reasons to Call    If you are having worsening/uncontrolled symptoms we want you to call!    You or your other physicians make any changes to medications we have prescribed.  -Please call for refills 4-5 days before you will run out of medication.    Important Phone Numbers    [facility] - Muriel Alvarado. Palliative Care RN - [phone]    [facility] - Shawna Jay. Palliative Care RN - [phone]  *For Refills, scheduling, and general questions - [phone]  *After hours or on weekends. Will connect you with on call MD. [phone]

## 2022-02-03 NOTE — PROGRESS NOTES
"Oncology Rooming Note    February 3, 2022 3:07 PM   Wanda Kaur is a 76 year old female who presents for:    Chief Complaint   Patient presents with     Oncology Clinic Visit     Initial Vitals: LMP  (LMP Unknown)  Estimated body mass index is 27.87 kg/m  as calculated from the following:    Height as of 10/15/19: 1.499 m (4' 11\").    Weight as of 8/12/20: 62.6 kg (138 lb). There is no height or weight on file to calculate BSA.  Data Unavailable Comment: Data Unavailable   No LMP recorded (lmp unknown). Patient has had a hysterectomy.  Allergies reviewed: Yes  Medications reviewed: Yes    Medications: Medication refills not needed today.  Pharmacy name entered into EPIC:    [facility] - [place], [place] - [address] [address] [facility] [id number] [place], [place] - [address] [facility] [facility]    Clinical concerns:  doctor was notified.      Celina Garrison [facility]            "

## 2022-02-03 NOTE — LETTER
"    2/3/2022         RE: Wanda Kaur  [address] [place] [address]        Dear Colleague,    Thank you for referring your patient, Wanda Kaur, to the [facility] [place]. Please see a copy of my visit note below.    Palliative Care Outpatient Clinic Consultation Note    Patient: Wanda Kaur    Chief Complaint/Patient ID:   75 yo F with severe interstitial lung disease thought to be 2/2 seronegative rheumatoid arthritis. Currently not on immunosuppression- previously tried Ofev and Cellcept but she did not tolerate so has been off medication since 2016.  Was following with Pulmonology in the past. She is oxygen dependent 24 hours/day.  She notes that her ability to tolerate activity has been diminishing over time.     When she was about age 70, she was on the transplant list \"for my kids\" for a bit.  She also states that a couple years ago she was enrolled with hospice for between 1 to 2 years, but then she graduated.    HPI:   During our visit, she was quite short of breath after walking up from the garage.  She notes her portable concentrator is \"on demand\", so she has to play catch up a lot more than when she is on her continuous flow at home.  Uses up 5L O2 during day, 4L at night. Over the last year, she has noticed significant increase in SOB with activity.  With very minimal actions such as getting undressed, her sats will drop to the high 70s, then recover with rest.  She notes that at baseline, she tends to have a respiratory rate between 30 to 40 breaths/min.  Had been on a prednisone taper for her breathing and cough recently and it did help but has started to notice symptoms return after stopping. Prefers to not be on steroids if can be avoided due to side effects.    Was previously on morphine when she was on hospice, however she only used it intermittently.  Her PCP gave her a prescription for Ativan which she uses periodically and finds some " "benefit from it.  She takes 0.5 to 1 mg.    She reports extreme fatigue as well as soreness, particularly in her back muscles.  Sometimes her  will give her back massage which does help.  She reports a 20 pound weight loss over the last year, however when she was on the prednisone recently she gained back about 45 pounds.  She has low appetite at baseline as well as some queasiness.    She reports that she sleeps decently.  She sleeps in her own bed, as all of her appointments and her 's snoring made it easier for them to sleep and set her events for the past few years.    She does think she has been depressed for some time.  She went through a phase a long time ago where it sounds like she had some passive suicidal ideation, such as thinking it would just be easier if everything soft.  She denies these thoughts anymore, however she continues to have some low mood.  She reports that her  is wonderful with the more \"practical\" tasks around the house such as chores and transportation, however he is not emotionally supportive for her    Social History  , lives with her .  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  Social History     Tobacco Use     Smoking status: Never Smoker     Smokeless tobacco: Never Used     Tobacco comment: smoked very briefly as a teen   Substance Use Topics     Alcohol use: Yes     Alcohol/week: 1.0 standard drink     Types: 1 Standard drinks or equivalent per week     Comment: 1 glass of wine daily     Drug use: No     Advance Care Planning: HCD on file, scanned in chart. Names spouse and son as HCAs.  POLST form on file states DNR/DNI-selective treatment.  We discussed this today, and she now reports a desire to avoid hospitalization if possible and focus on comfort at home.  Based on this, we updated her POLST form to be DNR/DNI-comfort focused care.    Family History- Reviewed in Epic.     Allergies   Allergen Reactions     No Known Drug Allergies        Medications- " "Reviewed in Epic.    Past Medical History- Reviewed in [facility].    Past Surgical History- Reviewed in Epic.    REVIEW OF SYSTEMS:   ROS: 10 point ROS neg other than the symptoms noted above in the HPI.      Physical Exam:  /76   Pulse 110   Resp 18   Ht 1.499 m (4' 11\")   Wt 58.8 kg (129 lb 9.6 oz)   LMP  (LMP Unknown)   SpO2 94%   BMI 26.18 kg/m    Constitutional: Alert, pleasant, mild respiratory distress  HEENT: No head abnormalities. Sclera non-icteric, no nasal discharge. Nasal cannula in place. MMM. Ears grossly normal.  Neck: Neck supple.   Respiratory: Rapid respirations with some accessory muscle use.  Speaking in short sentences.  Musculoskeletal: Extremities normal- no gross deformities noted. No edema. Normal muscle tone/bulk.  Skin: No suspicious lesions or rashes on limited skin exam.  Neurologic: Gait normal. Grossly normal with intact sensation bilaterally.  Psychiatric: Mentation appears normal, affect normal/bright. Does not appear anxious or depressed.    Data Reviewed:  LABS: 06/29/21- Cr 0.88, GFR 64. WBC 6.8, Hgb 12.5, Plts 236.    [place]  reviewed and fills consistent with history: Yes, only a couple Rxs for ativan from PCP in last year.    Impressions, Recommendations, & Counseling:  Wanda Kaur is a 76 year old female with history of ILD.    ILD  [place] - Has been worsening over the last several years.  Significant shortness of breath with very minimal activity.  Has not tolerated amiodarone suppressive medicines in the past, though does find benefit from the Combivent inhaler.  -We discussed the role of morphine in dyspnea management and she was open to trying this.  We will start 2.5 to 5 mg Q6H PRN.  -Discussed spacing morphine and Ativan out by 3 hours.  Sent prescription for Narcan as well just in case.  -Also recommended bedside and hand-held fans, as the air blowing on the face can also help with shortness of breath.    Depression  Difficulty coping - Mood has been " lower, particularly related to all of her health struggles with her breathing.  She has never been on any antidepressants in the past.  She is uncertain if she wants to consider this now, but she is open to referral to palliative care social work for additional emotional support and coping.  -Referral placed to  [facility] today.  -Could consider medication for mood in the future if she thinks it would be helpful.  My first choice would be mirtazapine as this could also help with her lower appetite.    ACP  GOC - Confirmed that her  would be her HCA as indicated by her healthcare directive on file.  We discussed code status today, and she now reports a desire to avoid hospitalization if possible and focus on comfort at home.  Based on this, we updated her POLST form to be DNR/DNI-comfort focused care.  She is not quite certain she is ready to work with hospice again, however offered in the future that at any time informational meeting could be set up.  For now, our goal will be trying to improve her shortness of breath and have her meet with palliative care social work to try to benefit her quality of life and go from there.    Follow up: We will attempt a virtual follow-up in about 3 months.  Discussed we could try Doximity, however since we met face-to-face visit could be conducted over the telephone.    Total time spent on day of encounter is 84 mins, including reviewing record, review of above studies, above visit with patient (FTF 3:21 PM-4:06 PM), symptomatic discussion of shortness of breath and mood, including medication adjustments/prescription management, goals of care conversation including updating POLST form, and documentation.     Lissa Gregorio,   Palliative Medicine   Pager [phone], AMCOM ID [id number]      Some chart documentation performed using Dragon Voice recognition Software. Although reviewed after completion, some words and grammatical errors may remain.      Oncology Rooming  "Note    February 3, 2022 3:07 PM   Wanda Kaur is a 76 year old female who presents for:    Chief Complaint   Patient presents with     Oncology Clinic Visit     Initial Vitals: LMP  (LMP Unknown)  Estimated body mass index is 27.87 kg/m  as calculated from the following:    Height as of 10/15/19: 1.499 m (4' 11\").    Weight as of 8/12/20: 62.6 kg (138 lb). There is no height or weight on file to calculate BSA.  Data Unavailable Comment: Data Unavailable   No LMP recorded (lmp unknown). Patient has had a hysterectomy.  Allergies reviewed: Yes  Medications reviewed: Yes    Medications: Medication refills not needed today.  Pharmacy name entered into EPIC:    [facility] - [place], [place] - [address] [facility] [address] [place], [place] - [address] [facility] [address]    Clinical concerns:  doctor was notified.      Celina Garrison, [facility]                Again, thank you for allowing me to participate in the care of your patient.        Sincerely,        Lissa Gregorio, DO    "

## 2022-02-21 NOTE — TELEPHONE ENCOUNTER
Huddled with Dr. Ng -   1. Ok for Prednisone 10mg daily. Try for one month. If helpful for cough & symptoms would continue. If not helping sooner, contact the clinic.     2. Ok to start Advair. Ok to discontinued Combivent or decrease use.     3. Ok to use oxygen level that would help tolerate ADLs and recover her oxygen levels when in activity.     4. Ok to look into Home Care Pulm Rehab or Respiratory Therapy. Ok to use liquid Morphine.     Patient agrees with plan. Let patient know I'd look into Pulm Rehab/Respiratory Therapy.       Patient left a voicemail on my line. Called back.     Patient inquires on:  1. Repeat Prednisone taper or starting on daily Prednisone.   2. Steroid inhaler  3. Would too much oxygen be bad for her?  4. Does respiratory therapy or pulmonary rehab go into the home?    COUGH  Cough is back. Producing clear sputum. Gradually increasing in the past week. Going through 2 boxes of Kleenex a day.   Becoming more sob with activities & taking longer to recover. Using 5lpm during day. 4lmp for night.   Denies fever, chest heaviness, feeling sick. Does not think she needs an antibiotic.   Felt better mid-way through the 20 day Prednisone taper Dr. Ng prescribed on 12/29/2021.   Palliative Care prescribed Morphine. Hasn't used much. Will try to use more.     COMBIVENT  Getting dizzy & having daily headaches with Combivent use. Decreased use and has seen a decrease in symptoms.

## 2022-02-24 NOTE — TELEPHONE ENCOUNTER
Spoke with [facility] on Tuesday, 2/23.     [facility] have NOT been aware of home care services for either of these.    Updated patient she agrees with plan.     Will follow up with patient next week to see how she is doing on the Advair & Prednisone.

## 2022-03-03 NOTE — TELEPHONE ENCOUNTER
Spoke with patient.   She started Prednisone 10mg daily & Advair on 2/21.   Has been 10 days on medication.   Patient hasn't seen a change in her cough.     Huddled with Dr. Hernandez Grande to increase Prednisone to 20mg every day x 14 days then decrease to 10mg daily.   Will take a few more weeks for the Advair to take effect.     Updated patient. She agrees to increase the Prednisone & continue Advair.   Sent new rx.     Will follow up with patient next week to check in.

## 2022-04-04 NOTE — TELEPHONE ENCOUNTER
Huddled with Dr. Ng earlier today - Ok to continue on Prednisone 5mg every day for another month.     Updated patient. She agrees with plan.     Patient expressed that she may want to see a respiratory therapist to advise on how to use her oxygen. She titrate's throughout the day and with activities and unsure if this is appropriate.     Patient does not want to go back to Pulmonology nor wants Pulmonary Rehab. Does not want to go through any testing like PFT's or 6 minute walks.     I did discuss this with Pulmonary Rehab Representative. They typically do the assessment in 2 parts. First visit is an interview with a 6 minute walk then second visit is using equipment.   Patient declined at this time.     I did encourage patient to call her oxygen company, [employer], to see if they had a Respiratory Therapist she could talk to.   Otherwise, following up with Pulmonology would be another best next step.     Patient felt comfortable with her current plan and will follow up as needed.

## 2022-04-04 NOTE — TELEPHONE ENCOUNTER
Will huddle with Dr. Ng to advise on - taper instructions and/or if 5mg daily would be an ok maintenance dose.       Patient calling.   Started Prednisone 10mg every day about 2/21.   Has been helpful for her cough and breathing.   Though side effects of hunger and weight gain are bothersome.   She dropped down to 5mg 4 days ago.     Did review with patient hat the benefits over side effects are something to consider.   That if patient wanted to restart in the future, she can always let us know.

## 2022-04-05 NOTE — PATIENT INSTRUCTIONS
Patient Education   Personalized Prevention Plan  You are due for the preventive services outlined below.  Your care team is available to assist you in scheduling these services.  If you have already completed any of these items, please share that information with your care team to update in your medical record.  Health Maintenance Due   Topic Date Due     ANNUAL REVIEW OF HM ORDERS  Never done     Diptheria Tetanus Pertussis (DTAP/TDAP/TD) Vaccine (2 - Td or Tdap) 01/08/2018     Zoster (Shingles) Vaccine (2 of 2) 05/24/2019     Mammogram  03/29/2020     FALL RISK ASSESSMENT  09/18/2020     Colorectal Cancer Screening  04/26/2021     COVID-19 Vaccine (4 - Booster for Pfizer series) 12/08/2021

## 2022-04-05 NOTE — PROGRESS NOTES
"Assessment & Plan   No diagnosis found.    Follow Up/Next Steps    No follow-ups on file.  Recommended that patient follow up with PCP to address the following : follow up with pcp to address chronic health conditions. phq-2 score 4, patient resting respiration 40, she has chronic lung disease. Patient due for zoster, dtap, mammogram screening, coloertal cancer screening, covid 19 booster.    Counseling and Education  Reviewed preventive health counseling, as reflected in patient instructions      BMI:   Estimated body mass index is 26.46 kg/m  as calculated from the following:    Height as of 2/3/22: 1.499 m (4' 11\").    Weight as of this encounter: 59.4 kg (131 lb).   Weight management plan: Patient was referred to their PCP to discuss a diet and exercise plan.      Appropriate preventive services were discussed with this patient, including applicable screening as appropriate for cardiovascular disease, diabetes, osteopenia/osteoporosis, and glaucoma.  As appropriate for age/gender, discussed screening for colorectal cancer, prostate cancer, breast cancer, and cervical cancer. Checklist reviewing preventive services available has been given to the patient.    Reviewed patients plan of care and provided an AVS. The Basic Care Plan (routine screening as documented in Health Maintenance) for Wanda meets the Care Plan requirement. This Care Plan has been established and reviewed with the Patient.    Visit Provider: Catina Pelaez RN  Supervising Provider: Liyah Britt MD, MD  [facility]       David Jaramillo is a 76 year old who is being seen for an Annual Wellness Visit     Healthy Habits:   PHQ-2 Total Score: 4    Do you feel safe in your environment? Yes    Have you ever done Advance Care Planning? (For example, a Health Directive, POLST, or a discussion with a medical provider or your loved ones about your wishes): Yes, advance care planning is on " file.    Fall risk  Fallen 2 or more times in the past year?: No  Any fall with injury in the past year?: No  Cognitive Screening   1) Repeat 3 items (Leader, Season, Table)    2) Clock draw: NORMAL  3) 3 item recall: Recalls 3 objects  Results: 3 items recalled: COGNITIVE IMPAIRMENT LESS LIKELY    Mini-CogTM Copyright SAL Leach. Licensed by the author for use in [facility]; reprinted with permission (phill@[email or web address]). All rights reserved.      Do you have sleep apnea, excessive snoring or daytime drowsiness?: yes    Reviewed and updated as needed this visit by clinical staff   Tobacco  Allergies  Meds   Med Hx  Surg Hx  Fam Hx  Soc Hx        Social History     Tobacco Use     Smoking status: Never Smoker     Smokeless tobacco: Never Used     Tobacco comment: smoked very briefly as a teen   Substance Use Topics     Alcohol use: Yes     Alcohol/week: 1.0 standard drink     Types: 1 Standard drinks or equivalent per week     Comment: 2 glass of wine daily       Current providers sharing in care for this patient include:   Patient Care Team:  Love Ng MD as PCP - General (Pediatrics)  Viktor Vincent (Rheumatology)  Oscar Delatorre MD as MD (Internal Medicine)  Miya Parker, RN as Nurse Coordinator (Pulmonary Disease)  Fernando Goetz MD as MD (Pulmonary Disease)  Brittany Nuñez, RN as Personal Advocate & Liaison (PAL)  Love Ng MD as Assigned PCP    The following health maintenance items are reviewed in Epic and correct as of today:  Health Maintenance Due   Topic Date Due     ANNUAL REVIEW OF  ORDERS  Never done     DTAP/TDAP/TD IMMUNIZATION (2 - Td or Tdap) 01/08/2018     ZOSTER IMMUNIZATION (2 of 2) 05/24/2019     MEDICARE ANNUAL WELLNESS VISIT  03/28/2020     MAMMO SCREENING  03/29/2020     FALL RISK ASSESSMENT  09/18/2020     COLORECTAL CANCER SCREENING  04/26/2021     COVID-19 Vaccine (4 - Booster for Pfizer series) 12/08/2021       Mammogram Screening -  "Patient over age 75, has elected to discontinue screenings.  Pertinent mammograms are reviewed under the imaging tab.        Objective    /67 (BP Location: Right arm, Patient Position: Sitting)   Pulse 92   Temp 98.6  F (37  C) (Temporal)   Resp 18   Wt 59.4 kg (131 lb)   LMP  (LMP Unknown)   SpO2 100%   BMI 26.46 kg/m   Estimated body mass index is 26.46 kg/m  as calculated from the following:    Height as of 2/3/22: 1.499 m (4' 11\").    Weight as of this encounter: 59.4 kg (131 lb).  Physical Exam  Patient appears comfortable and in no acute distress.        Identified Health Risks:  "

## 2022-04-26 NOTE — TELEPHONE ENCOUNTER
Routing to Dr. Ng to advise on:  1. Annual lab orders.   2. If patient is needing a visit or to increase her Prednisone for a period of time.     Spoke with patient.     LABS:   She inquires on annual labs to be drawn.   She planned to no longer follow with Dr. Goezt. Saw that she had future labs. Wanted to see if she needs these done and any other annual labs.     ILD:   In talking with the patient, she noted that her respiratory baseline has changed since last fall. Getting more short of breath with talking, household activity, needing to use a scooter at the store.     In the past two months, she has noticed a change in her activity tolerance & taking a longer time to rebound.     Reports having more shortness of breath and drop in sats with household tasks.   States with activity like going to the bathroom or in the kitchen her sats can drop to the 70s.   She rests and her sats recover at about 5 minutes though her respiratory & heart rate take about 15-20 minutes to drop to normal.   She has also noticed more recently that her respiratory rate while watching tv can be in the 60s and heart rate in the normal range.   While talking with the patient, she does become breathless with long conversations.    She feels that the Prednisone 5qd has been helpful with her cough.   Also takes the Advair inhaler.     Previously she was able to maintain respiratory rate & sats with household tasks.   Recently, she didn't go to her grand-daughters sports game due to her likely not being able to maintain sats or getting short of breath getting to the bleachers.     The way her oxygen is set up she is unable to turn up the oxygen during a high need time.   If she knows of a task that she will need more oxygen, she will have her  turn it up.     Uses 5LPM during the day. Goes down to 4LPM at night.   Uses liquid oxygen tanks located in her garage.     I did encourage patient to use the Morphine if she can. Especially  before her next Palliative care visit on 5/5 to be able to give them feedback on this use for her breathing.

## 2022-04-27 NOTE — TELEPHONE ENCOUNTER
Annual orders signed.    OK to increase prednisone if she finds it helpful at any point.    I'm happy to see her at any point - especially if worried about an acute infectious component.    I'm glad that she has follow up with palliative care next week.  I am hopeful that they will continue to have helpful interventions for symptom relief.    We could work to increase her oxygen.  Is her allotted supply the issue.  Perhaps seeing pulmonary medicine again to specifically talk about oxygen supports and changes would be reasonable if she was willing?    Love Ng MD

## 2022-04-27 NOTE — TELEPHONE ENCOUNTER
Updated patient on below.   She understands and agrees with the plan.     She would like to see a new Pulmonology Team.   Entered referral for [facility]. Scheduled with Dr. Jackson on 5/13/2022 at 2:30pm at the [facility].     Patient will keep us updated if there are any changes to her Prednisone or oxygen needs.

## 2022-05-04 NOTE — PROGRESS NOTES
"Palliative Care Clinic Progress Note    Patient Name: Wanda Kaur  Primary Provider: Love Ng    Chief Complaint/Patient ID: 77 yo F with severe interstitial lung disease thought to be 2/2 seronegative rheumatoid arthritis. Currently not on immunosuppression- previously tried Ofev and Cellcept but she did not tolerate so has been off medication since 2016.  Was following with Pulmonology in the past. She is oxygen dependent 24 hours/day.  She notes that her ability to tolerate activity has been diminishing over time.      When she was about age 70, she was on the transplant list \"for my kids\" for a bit.  She also states that a couple years ago she was enrolled with hospice for between 1 to 2 years, but then she graduated.    Last Palliative care appointment: 2/3/22 with me. Trial of low dose morphine for dyspnea, bed side fans. Referral to  LICSW.     Reviewed: Yes, no concerns.    Interim History:  Wanda Kaur 76 year old female returns to Palliative Care today for follow up via billable telephone encounter.      States she's no better as far as breathing goes. Can't do anything without getting SOB. Has tried morphine but not on a regular basis. Does think it has helped. Taking 5mg dose as the 2.5 mg did not do anything. Worries about side effects of opioids, particularly constipation.    PCP placed her on prednisone in March for a long taper starting at 30mg- was on 5mg for most of April. Coughing did improve with this, has started to return but not as bad. PCP had stated she can return to 5mg dose if needed. While she felt better but says she gained 12lbs. Does feel like she's used a little more morphine since the prednisone stopped.    [facility] in March was cancelled. Was worried she would be recommended to go to \"group\" and \"I'm not a sharer\". Feels mood is doing OK right now.    Has started wondering about additional in home care if her needs increase " at home.      Social History:  , lives with her .  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  Social History     Tobacco Use     Smoking status: Never Smoker     Smokeless tobacco: Never Used     Tobacco comment: smoked very briefly as a teen   Substance Use Topics     Alcohol use: Yes     Alcohol/week: 1.0 standard drink     Types: 1 Standard drinks or equivalent per week     Comment: 2 glass of wine daily     Drug use: No     Family History- Reviewed in Epic.    Allergies   Allergen Reactions     Combivent Dizziness and Headache     No Known Drug Allergies        Advanced Care Planning: HCD on file, scanned in chart. Names spouse and son as HCAs.  Updated her POLST form 2/3/22 to be DNR/DNI-comfort focused care, as she would like to avoid hospitalization if possible and focus on comfort at home.    Medications- Reviewed in Epic.    Past Medical History- Reviewed in [facility].    Past Surgical History- Reviewed in Epic.    Review of Systems:   ROS: 10 point ROS neg other than the symptoms noted above in the HPI.    Key Data Reviewed:  LABS: 06/29/21- Cr 0.88, GFR 64. WBC 6.8, Hgb 12.5, Plts 236.    Impression & Recommendations & Counseling:  Wanda Kaur is a 76 year old female with history of ILD.    ILD  DE LA ROSA - Has been worsening over the last several years.  Significant shortness of breath with very minimal activity.  Has not tolerated immunosuppressive medicines in the past, though does find benefit from the Combivent inhaler. Has felt some benefit from the morphine but has been worried about side effects.  -Continue 5 mg Q6H PRN. Again discussed the role and her concerns with the opioids. Discussed we could adjust this to 7.5mg in the future if needed but she was not ready for this today.  -Discussed she can take a senna with each dose of the morphine if needed.    Depression  Difficulty coping - Feels her mood is actually a little better right now.  Mainly expressed frustrations with not being able to do  his many things in her lying on her  for more, particularly when she might do things differently than he would.  -Previously referred to  LICSW, however she feels she does not need this right now, she can let me know in the future if she would like to reschedule this appointment.    Follow up: 3-4 months    Telephone Visit Details  Total length of phone call: 24 mins    Total time spent on day of encounter is 29 mins, including reviewing record, review of above studies, above visit with patient, symptomatic discussion of shortness of breath and mood, including medication adjustments/prescription management, and documentation.     Lissa Gregorio, DO  Palliative Medicine   Pager [phone], AMCOM ID [id number]    Some chart documentation performed using Dragon Voice recognition Software. Although reviewed after completion, some words and grammatical errors may remain.

## 2022-05-05 NOTE — TELEPHONE ENCOUNTER
----- Message from Miya Alvarado RN sent at 5/5/2022 12:18 PM CDT -----  Regarding: In person rtn with [facility]  Good afternoon,    Please call pt and schedule for an in person rtn with [facility] in Aug or Sept. Please put in a new 80 minute spot.    Thank you,    Muriel

## 2022-05-05 NOTE — LETTER
"    5/5/2022         RE: Wanda Kaur  [address] [place] [address]        Dear Colleague,    Thank you for referring your patient, Wanda Kaur, to the [facility] [place]. Please see a copy of my visit note below.    Palliative Care Clinic Progress Note    Patient Name: Wanda Kaur  Primary Provider: Love Ng    Chief Complaint/Patient ID: 77 yo F with severe interstitial lung disease thought to be 2/2 seronegative rheumatoid arthritis. Currently not on immunosuppression- previously tried Ofev and Cellcept but she did not tolerate so has been off medication since 2016.  Was following with Pulmonology in the past. She is oxygen dependent 24 hours/day.  She notes that her ability to tolerate activity has been diminishing over time.      When she was about age 70, she was on the transplant list \"for my kids\" for a bit.  She also states that a couple years ago she was enrolled with hospice for between 1 to 2 years, but then she graduated.    Last Palliative care appointment: 2/3/22 with me. Trial of low dose morphine for dyspnea, bed side fans. Referral to  LICSW.     Reviewed: Yes, no concerns.    Interim History:  Wanda Kaur 76 year old female returns to Palliative Care today for follow up via billable telephone encounter.      States she's no better as far as breathing goes. Can't do anything without getting SOB. Has tried morphine but not on a regular basis. Does think it has helped. Taking 5mg dose as the 2.5 mg did not do anything. Worries about side effects of opioids, particularly constipation.    PCP placed her on prednisone in March for a long taper starting at 30mg- was on 5mg for most of April. Coughing did improve with this, has started to return but not as bad. PCP had stated she can return to 5mg dose if needed. While she felt better but says she gained 12lbs. Does feel like she's used a little more morphine since the " "prednisone stopped.    Has not met with  LICSW yet- appt in March was cancelled. Was worried she would be recommended to go to \"group\" and \"I'm not a sharer\". Feels mood is doing OK right now.    Has started wondering about additional in home care if her needs increase at home.      Social History:  , lives with her .  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  Social History     Tobacco Use     Smoking status: Never Smoker     Smokeless tobacco: Never Used     Tobacco comment: smoked very briefly as a teen   Substance Use Topics     Alcohol use: Yes     Alcohol/week: 1.0 standard drink     Types: 1 Standard drinks or equivalent per week     Comment: 2 glass of wine daily     Drug use: No     Family History- Reviewed in Epic.    Allergies   Allergen Reactions     Combivent Dizziness and Headache     No Known Drug Allergies        Advanced Care Planning: HCD on file, scanned in chart. Names spouse and son as HCAs.  Updated her POLST form 2/3/22 to be DNR/DNI-comfort focused care, as she would like to avoid hospitalization if possible and focus on comfort at home.    Medications- Reviewed in Epic.    Past Medical History- Reviewed in [facility].    Past Surgical History- Reviewed in Epic.    Review of Systems:   ROS: 10 point ROS neg other than the symptoms noted above in the HPI.    Key Data Reviewed:  LABS: 06/29/21- Cr 0.88, GFR 64. WBC 6.8, Hgb 12.5, Plts 236.    Impression & Recommendations & Counseling:  Wanda Kaur is a 76 year old female with history of ILD.    ILD  DE LA ROSA - Has been worsening over the last several years.  Significant shortness of breath with very minimal activity.  Has not tolerated immunosuppressive medicines in the past, though does find benefit from the Combivent inhaler. Has felt some benefit from the morphine but has been worried about side effects.  -Continue 5 mg Q6H PRN. Again discussed the role and her concerns with the opioids. Discussed we could adjust this to 7.5mg in the future " if needed but she was not ready for this today.  -Discussed she can take a senna with each dose of the morphine if needed.    Depression  Difficulty coping - Feels her mood is actually a little better right now.  Mainly expressed frustrations with not being able to do his many things in her lying on her  for more, particularly when she might do things differently than he would.  -Previously referred to  LICSW, however she feels she does not need this right now, she can let me know in the future if she would like to reschedule this appointment.    Follow up: 3-4 months    Telephone Visit Details  Total length of phone call: 24 mins    Total time spent on day of encounter is 29 mins, including reviewing record, review of above studies, above visit with patient, symptomatic discussion of shortness of breath and mood, including medication adjustments/prescription management, and documentation.     Lissa Gregorio DO  Palliative Medicine   Pager [phone], AMCOM ID [id number]    Some chart documentation performed using Dragon Voice recognition Software. Although reviewed after completion, some words and grammatical errors may remain.      Ella is a 76 year old who is being evaluated via a billable telephone visit.      What phone number would you like to be contacted at?  [phone]    How would you like to obtain your AVS? Nadirt  Patient is no longer taking prednisone 5 MG since 4/2722 Hiwot Moreno       Again, thank you for allowing me to participate in the care of your patient.        Sincerely,        Lissa Gregorio DO

## 2022-05-05 NOTE — PROGRESS NOTES
Ella is a 76 year old who is being evaluated via a billable telephone visit.      What phone number would you like to be contacted at?  [phone]    How would you like to obtain your AVS? MyCxandert  Patient is no longer taking prednisone 5 MG since 4/2722 Hiwot Moreno

## 2022-05-05 NOTE — PROGRESS NOTES
Oncology Distress Screening Follow-up  Clinical Social Work  [facility]    Identified Concern and Score From Distress Screenin. How concerned are you about your ability to eat?  5       2. How concerned are you about unintended weight loss or your current weight?  5       3. How concerned are you about feeling depressed or very sad?  4       4. How concerned are you about feeling anxious or very scared?  3       5. Do you struggle with the loss of meaning and danica in your life?  Quite a bit Abnormal        6. How concerned are you about work and home life issues that may be affected by your cancer?  8 Abnormal        7. How concerned are you about knowing what resources are available to help you?  1       8. Do you currently have what you would describe as Restorationism or spiritual struggles?             Not at all         Date of Distress Screenin2022    Intervention:   Ella is a 76-year-old woman with a diagnosis of severe interstitial lung disease who is followed by Dr. Gregorio at [facility]. This clinician called Ella today with goal of following up on elevated distress.     Ella napping at time of social work outreach. This clinician left detailed voicemail with social work contact information and availability with spouse.     Follow-up Required:   This clinician will await return call. SW will continue to be available as needed for ongoing psychosocial support.       BRANDY Hi, [facility]  Phone: [phone]  [facility]: M, Thu  *every other Tue, 8am-4:30pm  [facility]: W, F, *every other Tue, 8am-4:30pm

## 2022-05-05 NOTE — LETTER
"    5/5/2022         RE: Wanda Kaur  [address] [place] [address]        Dear Colleague,    Thank you for referring your patient, Wanda Kaur, to the [facility] [place]. Please see a copy of my visit note below.    Palliative Care Clinic Progress Note    Patient Name: Wanda Kaur  Primary Provider: Love Ng    Chief Complaint/Patient ID: 75 yo F with severe interstitial lung disease thought to be 2/2 seronegative rheumatoid arthritis. Currently not on immunosuppression- previously tried Ofev and Cellcept but she did not tolerate so has been off medication since 2016.  Was following with Pulmonology in the past. She is oxygen dependent 24 hours/day.  She notes that her ability to tolerate activity has been diminishing over time.      When she was about age 70, she was on the transplant list \"for my kids\" for a bit.  She also states that a couple years ago she was enrolled with hospice for between 1 to 2 years, but then she graduated.    Last Palliative care appointment: 2/3/22 with me. Trial of low dose morphine for dyspnea, bed side fans. Referral to  LICSW.     Reviewed: Yes, no concerns.    Interim History:  Wanda Kaur 76 year old female returns to Palliative Care today for follow up via billable telephone encounter.      States she's no better as far as breathing goes. Can't do anything without getting SOB. Has tried morphine but not on a regular basis. Does think it has helped. Taking 5mg dose as the 2.5 mg did not do anything. Worries about side effects of opioids, particularly constipation.    PCP placed her on prednisone in March for a long taper starting at 30mg- was on 5mg for most of April. Coughing did improve with this, has started to return but not as bad. PCP had stated she can return to 5mg dose if needed. While she felt better but says she gained 12lbs. Does feel like she's used a little more morphine since the " "prednisone stopped.    Has not met with  LICSW yet- appt in March was cancelled. Was worried she would be recommended to go to \"group\" and \"I'm not a sharer\". Feels mood is doing OK right now.    Has started wondering about additional in home care if her needs increase at home.      Social History:  , lives with her .  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  Social History     Tobacco Use     Smoking status: Never Smoker     Smokeless tobacco: Never Used     Tobacco comment: smoked very briefly as a teen   Substance Use Topics     Alcohol use: Yes     Alcohol/week: 1.0 standard drink     Types: 1 Standard drinks or equivalent per week     Comment: 2 glass of wine daily     Drug use: No     Family History- Reviewed in Epic.    Allergies   Allergen Reactions     Combivent Dizziness and Headache     No Known Drug Allergies        Advanced Care Planning: HCD on file, scanned in chart. Names spouse and son as HCAs.  Updated her POLST form 2/3/22 to be DNR/DNI-comfort focused care, as she would like to avoid hospitalization if possible and focus on comfort at home.    Medications- Reviewed in Epic.    Past Medical History- Reviewed in [facility].    Past Surgical History- Reviewed in Epic.    Review of Systems:   ROS: 10 point ROS neg other than the symptoms noted above in the HPI.    Key Data Reviewed:  LABS: 06/29/21- Cr 0.88, GFR 64. WBC 6.8, Hgb 12.5, Plts 236.    Impression & Recommendations & Counseling:  Wanda Kaur is a 76 year old female with history of ILD.    ILD  DE LA ROSA - Has been worsening over the last several years.  Significant shortness of breath with very minimal activity.  Has not tolerated immunosuppressive medicines in the past, though does find benefit from the Combivent inhaler. Has felt some benefit from the morphine but has been worried about side effects.  -Continue 5 mg Q6H PRN. Again discussed the role and her concerns with the opioids. Discussed we could adjust this to 7.5mg in the future " if needed but she was not ready for this today.  -Discussed she can take a senna with each dose of the morphine if needed.    Depression  Difficulty coping - Feels her mood is actually a little better right now.  Mainly expressed frustrations with not being able to do his many things in her lying on her  for more, particularly when she might do things differently than he would.  -Previously referred to  LICSW, however she feels she does not need this right now, she can let me know in the future if she would like to reschedule this appointment.    Follow up: 3-4 months    Telephone Visit Details  Total length of phone call: 24 mins    Total time spent on day of encounter is 29 mins, including reviewing record, review of above studies, above visit with patient, symptomatic discussion of shortness of breath and mood, including medication adjustments/prescription management, and documentation.     Lissa Gregorio DO  Palliative Medicine   Pager [phone], AMCOM ID [id number]    Some chart documentation performed using Dragon Voice recognition Software. Although reviewed after completion, some words and grammatical errors may remain.      Ella is a 76 year old who is being evaluated via a billable telephone visit.      What phone number would you like to be contacted at?  [phone]    How would you like to obtain your AVS? Nadirt  Patient is no longer taking prednisone 5 MG since 4/2722 Hiwot Moreno       Again, thank you for allowing me to participate in the care of your patient.        Sincerely,        Lissa Gregorio DO

## 2022-05-05 NOTE — PATIENT INSTRUCTIONS
Recommendations:  -Okay to continue using the morphine 5 mg at a time to help with shortness of breath.  You can take this up to 4 times a day, however I understand if you want to use it less frequently for now.  -We discussed that if you do start taking it more regularly, you may want to take 1 senna tablet with every dose to prevent constipation.  -Let me know if you would like a new referral to the palliative care  in the future.    Follow up: About 3 to 4 months in person.      Reasons to Call    If you are having worsening/uncontrolled symptoms we want you to call!    You or your other physicians make any changes to medications we have prescribed.  -Please call for refills 4-5 days before you will run out of medication.    Important Phone Numbers    [facility] - Muriel Alvarado. Palliative Care RN - [phone]    [facility] [place] Shawna Jay. Palliative Care RN - [phone]  *For Refills, scheduling, and general questions - [phone]  *After hours or on weekends. Will connect you with on call MD. [phone]

## 2022-05-06 NOTE — PROGRESS NOTES
Social Work Progress Note      Data/Intervention:  Patient Name:  Wanda Kaur  /Age:  1945 (76 year old)    Reason for Follow-Up:  Ella is a 76-year-old woman with a diagnosis of severe interstitial lung disease who is followed by Dr. Gregorio at [facility]. This clinician received return call from Ella yesterday, SW responding to VM today.     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 Provided safe place for Ella to reflect upon changes that have happened in her mobility and ability to care for herself as a result of her illness. Ella reports that she paces herself to support her ability to get through day, but that  has taken on many of the care needs in home. Ella reports that her O2 stat drops into 70s when she changes clothes. Ella reports that  assists with vacuuming, dishes, laundry, yard work, and that Ella is still able to dust in home. Ella reports that she is not in need of any assistant device to get around home at present time, but that getting out of home is harder because she depends upon wheelchairs or scooters at stores.     Reviewed with Ella Medicare guidelines for scooter coverage, and costs for paying for scooters out-of-pocket. Also discussed that Ella could rent a scooter from DME location if desired, Ella familiar with [employer]. Ella reports that she understands that she would not qualify for Medicare coverage of a scooter at present time.     This clinician spoke generally about hospice care per Ella's request. SW provided education about members of care team, frequency of visits, 24/ availability by phone. This clinician also reviewed that at times people consider privately hiring support in home in addition to a hospice care team. Ella reported that she would continue to be mindful of this in the future. Family had a cleaning service in the past that they have  considered engaging with. LITA discussed DARTs programming available to Ella today.      Resources Provided:  DARTs   Onc LITA contact information    Plan:  Previously provided patient/family with writer's contact information and availability. Will continue to be available as needed for ongoing psychosocial support.     Please call or page if needs or concerns arise.     BRANDY Hi, LICSW  Direct Phone: [phone]  Pager: [phone]

## 2022-05-20 NOTE — PROGRESS NOTES
"SPIRITUAL HEALTH SERVICES   Left Message Note  [facility]    Reason for Contact: Referred to contact Ella per her response to the oncology distress screen. \"Do you struggle with the loss of meaning and danica in your life? : QUITE A BIT\"    Intervention: Documentation reviewed. Introduced self/role to Ella who politely declined support at this time. Oriented her to requesting future support, if desired.    Plan: Spiritual Health remains available.    Beatrice Hernandez  Associate   Phone [phone]  "

## 2022-05-20 NOTE — TELEPHONE ENCOUNTER
"SPIRITUAL HEALTH SERVICES   Left Message Note  [facility]    Reason for Contact: Referred to contact Ella per her response to the oncology distress screen. \"Do you struggle with the loss of meaning and danica in your life? : QUITE A BIT\"    Intervention: Documentation reviewed. Introduced self/role to Ella who politely declined support at this time. Oriented her to requesting future support, if desired.    Plan: Spiritual Health remains available.    Beatrice Hernandez  Associate   Phone [phone]  "

## 2022-07-05 NOTE — TELEPHONE ENCOUNTER
Patient calling regarding rx for fluticasone-salmeterol (ADVAIR) 250-50 MCG/DOSE inhaler. Patient is wanting to know if ok to stop medication or if switch to something else would be advised.     Patient wants to stop inhaler due to possible side effects. Patient states her contacts are not fitting correctly anymore, eyes are dry. Patient believes wixela rx is causing eye symptoms. Patient does not feel that wixela has been helping with breathing. Patient wants message sent to PCP to advise if stopping wixela would be advised.     Please review and advise.     Lux LOWERY RN

## 2022-07-11 NOTE — TELEPHONE ENCOUNTER
"Patient is having her  drive her to the ER right now.     Spoke with patient.   She is significantly sob with talking. At baseline she gets sob with longer conversations.   Patient is panting with her phrases.   90% at rest with 5LPM.   Patient notes this is \"the worse its ever been\".   Explained patient needs ER evaluation due to significant change in her ILD symptoms.   Patient agrees with plan.     Reason for Disposition    MODERATE difficulty breathing (e.g., speaks in phrases, SOB even at rest, pulse 100-120) of new onset or worse than normal    Answer Assessment - Initial Assessment Questions  1. RESPIRATORY STATUS: \"Describe your breathing?\" (e.g., wheezing, shortness of breath, unable to speak, severe coughing)       Coughing, able to speak in short sentences, 02 sats dropping with minimal activity.   2. ONSET: \"When did this breathing problem begin?\"       Gradually worsening in the past week. Today pronounced.   3. PATTERN \"Does the difficult breathing come and go, or has it been constant since it started?\"       Has ILD. Noticed progression in the past week.   4. SEVERITY: \"How bad is your breathing?\" (e.g., mild, moderate, severe)     - MODERATE: SOB at rest, SOB with minimal exertion and prefers to sit, cannot lie down flat, speaks in phrases, mild retractions, audible wheezing, pulse 100-120.       90% on 5LPM talking to me right now. Reports 02 dropping in the 60s going to the bathroom. Taking longer to recover.   5. RECURRENT SYMPTOM: \"Have you had difficulty breathing before?\" If so, ask: \"When was the last time?\" and \"What happened that time?\"       Yes, has daily due to ILD. This is a change from baseline.   6. CARDIAC HISTORY: \"Do you have any history of heart disease?\" (e.g., heart attack, angina, bypass surgery, angioplasty)       No  7. LUNG HISTORY: \"Do you have any history of lung disease?\"  (e.g., pulmonary embolus, asthma, emphysema)      ILD  8. CAUSE: \"What do you think is causing " "the breathing problem?\"       ILD flare  9. OTHER SYMPTOMS: \"Do you have any other symptoms? (e.g., dizziness, runny nose, cough, chest pain, fever)      Headache, fatigue.   10. PREGNANCY: \"Is there any chance you are pregnant?\" \"When was your last menstrual period?\"        No  11. TRAVEL: \"Have you traveled out of the country in the last month?\" (e.g., travel history, exposures)        No. Home bound.    Protocols used: BREATHING DIFFICULTY-A-OH    "

## 2022-07-11 NOTE — ED PROVIDER NOTES
History   Chief Complaint:  Shortness of Breath       HPI   Wanda Kaur is a 76 year old female with history of pulmonary fibrosis, CRF and interstitial lung disease who presents with shortness of breath. Per the patient, increasingly over the past several months her breathing has gotten worse to the point where the past couple of weeks she could not do much at home without becoming short of breath. She reports a bad episode of hypoxia yesterday and today. She is on 5L of O2 at home during the day and 4L at night and she increases to 6L as needed. She reports a productive cough, as well as right sided chest pain and headache when short of breath. She denies fever, leg edema, or coughing up blood. She also reports pain in her right calf earlier this week which resolved after a couple of days. She has been taking ibuprofen and albuterol as needed for her symptoms.     She attributes her worsening symptoms to her pulmonary fibrosis which she has had since 2016. She notes that she has a palliative care plan and a DNR DNI order. She stopped taking prednisone because of the side effects but she is open to a short burst of steroids to get through this episode. She does not like the idea of being admitted. She is not on blood thinners and denies history of blood clot or heart problems.     Review of Systems   Constitutional: Negative for fever.   Respiratory: Positive for cough and shortness of breath.    Cardiovascular: Positive for chest pain.   Musculoskeletal:        Negative for leg edema.    Neurological: Positive for headaches.   All other systems reviewed and are negative.      Allergies:  Combivent    Medications:  Albuterol  Advair  Fluzone Quadrivalent   Lactase  Lorazepam  Morphine  Naloxone  Sennosides    Past Medical History:     Enthesopathy of Hip Region  Mediastinal Mass  Rheumatoid Arthritis   Methotrexate Lung  CRF  Interstitial Lung Disease  Sick-Euthyroid Syndrome  Anemia  Acquired Trigger  Finger  MYRANDA Positive  Elevated Erythrocyte Sedimentation Rate  Erosive Osteoarthritis, Bilateral Hands  Pulmonary Fibrosis      Past Surgical History:    Arthroplasty, Bilateral Shoulders   Breast Cyst Removal, Left  Carpal Tunnel Release, Right  Cervical Laminectomy   Foraminotomy Cervical Posterior Two Levels, C7-T1  Fusion Cervical Anterior One Level, C6-7  Cholecystectomy  Hysterectomy, Salpingo-Oophorectomy  Wedge Resection, Right Lung     Family History:    COPD, Osteoarthritis - Mother  Heart Disease, Osteoarthritis - Father    Social History:  Patient is accompanied by her .   Patient arrived via a private vehicle.     Physical Exam     Patient Vitals for the past 24 hrs:   BP Temp Temp src Pulse Resp SpO2   07/11/22 1850 124/63 -- -- 99 (!) 60 99 %   07/11/22 1830 (!) 154/99 -- -- 109 30 95 %   07/11/22 1730 130/49 -- -- 105 -- 92 %   07/11/22 1715 120/76 -- -- 86 30 100 %   07/11/22 1710 120/76 -- -- 86 29 99 %   07/11/22 1700 119/63 -- -- 88 29 98 %   07/11/22 1650 122/61 -- -- 86 16 100 %   07/11/22 1630 111/55 -- -- 86 -- 100 %   07/11/22 1530 -- -- -- 85 15 100 %   07/11/22 1520 (!) 147/78 -- -- 90 (!) 42 100 %   07/11/22 1510 -- -- -- 90 16 100 %   07/11/22 1500 132/68 -- -- -- -- --   07/11/22 1449 124/69 99.9  F (37.7  C) Temporal 100 (!) 54 100 %       Physical Exam  Vitals and nursing note reviewed.   Constitutional:       General: She is in acute distress.      Appearance: Normal appearance. She is not ill-appearing or toxic-appearing.   HENT:      Head: Normocephalic and atraumatic.      Right Ear: External ear normal.      Left Ear: External ear normal.   Eyes:      Extraocular Movements: Extraocular movements intact.      Conjunctiva/sclera: Conjunctivae normal.   Cardiovascular:      Rate and Rhythm: Normal rate and regular rhythm.      Heart sounds: No murmur heard.  Pulmonary:      Effort: Pulmonary effort is normal. Tachypnea present. No respiratory distress.      Breath sounds:  Rales (scattered bilaterally) present. No wheezing or rhonchi.   Abdominal:      General: Abdomen is flat. Bowel sounds are normal. There is no distension.      Palpations: Abdomen is soft.      Tenderness: There is no abdominal tenderness. There is no guarding or rebound.   Musculoskeletal:         General: No swelling, deformity or signs of injury.      Cervical back: Normal range of motion and neck supple.   Skin:     General: Skin is warm and dry.      Findings: No rash.   Neurological:      Mental Status: She is alert and oriented to person, place, and time.   Psychiatric:         Behavior: Behavior normal.           Emergency Department Course   ECG  ECG results from 07/11/22   EKG 12-lead, tracing only     Value    Systolic Blood Pressure     Diastolic Blood Pressure     Ventricular Rate 92    Atrial Rate 92    CT Interval 152    QRS Duration 76        QTc 417    P Axis 24    R AXIS 8    T Axis 9    Interpretation ECG      Sinus rhythm  Normal ECG  When compared with ECG of 30-DEC-2020 14:51,  Minimal criteria for Inferior infarct are no longer Present         Imaging:  CT Chest Pulmonary Embolism w Contrast   Final Result   IMPRESSION:   1.  No pulmonary embolism identified. No definite acute abnormalities evident.   2.  Progression of patient's chronic interstitial lung disease.      XR Chest Port 1 View   Final Result   IMPRESSION: Chronic bibasilar reticular/fibrotic changes, similar to   most recent CT but increased from chest radiograph in 2018. No   definite superimposed, focal airspace disease. No pleural effusion or   pneumothorax. No acute bony abnormality. Bilateral shoulder   arthroplasties and ACDF hardware noted. Prior cholecystectomy.      GARY JOYA MD            SYSTEM ID:  [id number]        Report per radiology    Laboratory:  Labs Ordered and Resulted from Time of ED Arrival to Time of ED Departure   BASIC METABOLIC PANEL - Abnormal       Result Value    Sodium 137      Potassium  4.2      Chloride 105      Carbon Dioxide (CO2) 27      Anion Gap 5      Urea Nitrogen 15      Creatinine 0.84      Calcium 7.6 (*)     Glucose 84      GFR Estimate 72     D DIMER QUANTITATIVE - Abnormal    D-Dimer Quantitative 0.99 (*)    CBC WITH PLATELETS AND DIFFERENTIAL - Abnormal    WBC Count 6.1      RBC Count 3.88      Hemoglobin 12.3      Hematocrit 38.5      MCV 99      MCH 31.7      MCHC 31.9      RDW 12.0      Platelet Count 253      % Neutrophils 77      % Lymphocytes 8      % Monocytes 9      % Eosinophils 5      % Basophils 1      % Immature Granulocytes 0      NRBCs per 100 WBC 0      Absolute Neutrophils 4.7      Absolute Lymphocytes 0.5 (*)     Absolute Monocytes 0.5      Absolute Eosinophils 0.3      Absolute Basophils 0.1      Absolute Immature Granulocytes 0.0      Absolute NRBCs 0.0     TROPONIN I - Normal    Troponin I High Sensitivity 4     NT PROBNP INPATIENT - Normal    N terminal Pro BNP Inpatient 206     INFLUENZA A/B & SARS-COV2 PCR MULTIPLEX - Normal    Influenza A PCR Negative      Influenza B PCR Negative      RSV PCR Negative      SARS CoV2 PCR Negative        Emergency Department Course:    Reviewed:  I reviewed nursing notes, vitals and past medical history    Assessments:  1510 I obtained history and examined the patient as noted above.   1638 I rechecked the patient and explained findings.   1827 I rechecked the patient and explained findings.     Consults:  1855 I spoke to [place]  about hospice service.     Interventions:  1528 ipratropium - albuterol 0.5 mg/2.5 mg/3 mL (DUONEB) neb solution 3 mL  1528 methylPREDNISolone sodium succinate (solu-MEDROL) injection 125 mg    Disposition:  The patient was discharged to home.     Impression & Plan     CMS Diagnoses: None      Medical Decision Makin-year-old female presenting with increase from her baseline shortness of breath related to her pulmonary fibrosis.  This has been progressive over months but seems to be  worse in the last few days.  Differential diagnosis includes progression of her pulmonary fibrosis, pneumonia or other infectious etiology, pulmonary edema or other cardiac etiology, pulmonary embolism.  Suspect progression of her pulmonary fibrosis given the gradual worsening.  Will check lab work, EKG, chest x-ray, COVID-19 swab and attempt to treat her with a DuoNeb and IV Solu-Medrol.  She is a DNR/DNI and makes it clear that she does not want any her heroic measures.    1827 rechecked patient and updated her on her findings.  Her COVID was negative.  Her CT of the chest showed no signs of any PE or pneumonia, however her pulmonary fibrosis appears to be progressing.  We discussed the option for hospitalization versus trying a steroid taper at home.  Patient is adamant about not wanting to be admitted.  She is interested in looking into hospice again.  I discussed with the  in the hospital and she will contact patient's outpatient  to help facilitate this tomorrow.  This is likely due to progression of her pulmonary fibrosis and is unlikely that there is much that can be done to prevent this.  Hopefully the steroids will give her temporary relief.  She also has supplemental oxygen which she can increase at home.  We discussed return precautions however patient is realistic about her prognosis and would prefer not to spend her time in the hospital.    Diagnosis:    ICD-10-CM    1. Pulmonary fibrosis (H)  J84.10    2. Shortness of breath  R06.02        Discharge Medications:  Discharge Medication List as of 7/11/2022  6:47 PM          Scribe Disclosure:  I, Juan Carlos Archer, am serving as a scribe at 3:02 PM on 7/11/2022 to document services personally performed by Jarvis Lee MD based on my observations and the provider's statements to me.            Jarvis Lee MD  07/11/22 8441

## 2022-07-11 NOTE — ED TRIAGE NOTES
Pt presents with worsening SOB. Hx fibrotic lung and chronic home O2 use, 5L NC during the day. Pt reports O2 sats drop to 60's with any exertion the past 2-3 weeks. SOB has significantly worsened in the past few days. Tachypnic in triage.

## 2022-07-12 NOTE — ED NOTES
D/I: Evening SW paged due to pt wanting to potentially initiate hospice services tomorrow.  Pt does not want admission to the hospital.  Pt has spoken with Clinic SW about hospice previously.  SW reviewed chart and sees documentation that Clinic SW Eva Wyatt has spoken with pt about hospice and other community services available to her, but it is unclear whether any hospice agency was involved.  LITA has sent email to [facility] SW to follow up with appropriate Clinic SW to initiate services with pt, via spouse.    A: Deferred.  P: SW to reach out to pt/spouse via phone on 7/12/22.

## 2022-07-12 NOTE — PROGRESS NOTES
[facility] [place]    Background: Care Coordination referral placed from hospitals discharge report for reason of patient meeting criteria for a TCM outreach call by [facility] team.    Assessment: Upon chart review, CCRC Team member will cancel/close the referral for TCM outreach due to reason below:    SW connected with patient.    Plan: Care Coordination referral for TCM outreach canceled.      Ariella Buchanan MA  [facility] [place], [facility]    *Connected Care Resource Team does NOT follow patient ongoing. Referrals are identified based on internal discharge reports and the outreach is to ensure patient has an understanding of their discharge instructions.

## 2022-07-12 NOTE — PROGRESS NOTES
Social Work Progress Note      Data/Intervention:  Patient Name:  Wanda Kaur  /Age:  1945 (76 year old)    Reason for Follow-Up:  Ella is a 76 year-old woman with a diagnosis of severe interstitial lung disease who is followed by Dr. Gregorio at [facility].  This SW received handoff from ED SW after Ella visited the ED on  and discussion of innitiating hospice from the community was discussed.      Intervention: LITA reached out to Ella via phone to discuss hospice referral further.  Ella confirmed with SW that she is feeling ready to enroll with in-home hospice.  Ella previously worked with [facility], however was graduated off of hospice for a period time due to improvements in health.  She is familiar with the hospice philosophy and model of care.      Ella is interested in working with a new hospice agency at this time.  LITA discussed Medicare.gov cost compare list for hospice agencies in her area.  LITA and Ella discussed three hospice agencies (listed below) and she will call one or all three today to determine which agency feels like best match to meet her current needs.      Onc LITA and Ella previously discussed the option of privately hiring home care support on top of hospice.  This SW reviewed that option with Ella and she states that, at this time, she would like to start with weekly hospice RN visits and will decide if she needs to add on more frequent RN visits in the future.  Ella is aware that SW team can assist with this, if she finds she is needing more support.    Support with pain management.    It is difficult for Ella to talk on the phone for a long time as she starts to loose her breath.  Ella will reach out to hospice agencies and check back in with LITA end of day with an update.     Addend 1430: LITA reached back out to Ella who states she called [facility] and is waiting to hear back from an RN.  Ella will call Onc SW team  once she has selected the hospice agency she wishes to work with.  Once decision is made, SW team will submit referral to selected hospice agency.  This SW handed off to ongoing Onc SW coverage team who will await callback from [facility].     Addend 1610:  SW received VM from [facility] stating she would like to work with [facility].  SW will handoff to [facility] to initiate referral and follow back up with [facility].     Resources Provided:  [facility] ([phone])  [facility] ([phone])  [facility] ([phone])    Plan:  SW team will continue to follow to coordinate connection to hospice.   Provided patient/family with writer's contact information and availability.     Please call if needs or concerns arise.     BRANDY Payne, [facility] Center[facility]  Direct Phone: [phone]

## 2022-07-13 NOTE — PROGRESS NOTES
Social Work Progress Note      Patient Name:  Wanda Kaur  /Age:  1945 (76 year old)    Reason for Follow-Up:  Hospice referral    Intervention: Oncology SW following up re: hospice referral. Pt would like to pursue hospice with [facility]. SW called [facility] ([phone]) to initiate referral; they state they already spoke to pt yesterday and are awaiting hospice orders.     SW called pt. She is agreeable with [facility] and states she has a PCP appt with Dr. Ng tomorrow.     SW reached out to Palliative RN and MD re: hospice orders and whether they should come from palliative or PCP.    Until then, SW faxed over most recent H&P for referral to hospice (fax: [phone]).      Plan:  Either Palliative or PCP will need to write orders for hospice and send to [facility] (fax: [phone]).      Shyla NAVA, [facility] HospitalSW  Direct Phone: [phone]

## 2022-07-14 NOTE — PATIENT INSTRUCTIONS
Let's draw out your prednisone taper - new script sent to pharmacy - keep me posted with how this goes    I'll send more ativan to the pharmacy    Let's try a muscle relaxant for your back - tizanidine sent to the pharmacy - let me know how this goes    Holler if you need ANYTHING

## 2022-07-14 NOTE — PROGRESS NOTES
Assessment & Plan       ICD-10-CM    1. Chronic respiratory failure with hypoxia (H)  J96.11 predniSONE (DELTASONE) 10 MG tablet    Transitioning to hospice care at this time.  Planning on longer prednisone taper as patient has required that historically - adjusted dosing today.      Plan to continue ativan, morphine for now.  Also added tizanidine for muscle spasm related pain         2. ILD (interstitial lung disease) (H)  J84.9 predniSONE (DELTASONE) 10 MG tablet     LORazepam (ATIVAN) 0.5 MG tablet   3. Rheumatoid arthritis with negative rheumatoid factor, involving unspecified site (H)  M06.00    4. High priority for 2019-nCoV vaccine  Z23    5. Muscle spasm  M62.838 tiZANidine (ZANAFLEX) 2 MG tablet       6}     See Patient Instructions    Return in about 1 week (around 7/21/2022), or if symptoms worsen or fail to improve.    Love Ng MD  [facility] TEO Jaramillo is a 76 year old presenting for the following health issues:  Hospital F/U and Imm/Inj (COVID-19 VACCINE)      HPI       ER Follow-up Visit:    Hospital/Nursing Home/IP Rehab Facility: [facility]  Date of Admission: 7/11  Date of Discharge: 7/11  Reason(s) for Admission: shortness of breath      Was your hospitalization related to COVID-19? No   Problems taking medications regularly:  none  Medication changes since discharge: 40 mg PO  prednisone   Problems adhering to non-medication therapy:None    Summary of ER stay records reviewed    Post Discharge Medication Reconciliation: discharge medications reconciled and changed, per note/orders.  Plan of care communicated with patient            Patient with worsening chronic respiratory failure with hypoxia secondary to interstitial lung disease.   Recent ER visit for change in respiratory status after stopping steroids due to side effects.   Restarted steroids and has started to notice improvement in cough and dyspnea.   Oxygen requirements  "increasing over time.  Has elected to start hospice therapy and it appears that orders were started yesterday through the palliative care team.   Patient has already spoken with [facility].    Goal of comfort - has noticed some improvement in her dyspnea and pain with morphine or ativan - only uses one at a time at present.   Also having increased muscle spasms of entire back.      Interested in COVID booster today.                Objective    BP (!) 140/70   Pulse 74   Temp 98.1  F (36.7  C) (Oral)   Resp (!) 56   Ht 1.499 m (4' 11\")   Wt 60.5 kg (133 lb 4.8 oz)   LMP  (LMP Unknown)   SpO2 100%   BMI 26.92 kg/m   on 6L oxygen via NC at rest  Body mass index is 26.92 kg/m .   Wt Readings from Last 4 Encounters:   07/14/22 60.5 kg (133 lb 4.8 oz)   04/05/22 59.4 kg (131 lb)   02/03/22 58.8 kg (129 lb 9.6 oz)   08/12/20 62.6 kg (138 lb)       Physical Exam   GENERAL: alert, elderly and fatigued  RESP: tachypneic, conversationally dyspneic, coarse rales bilateral lung bases, frequent harsh cough  CV: regular rate and rhythm, normal S1 S2, no S3 or S4, no murmur, click or rub, no peripheral edema and peripheral pulses strong  PSYCH: mentation appears normal, affect normal/bright    Reviewed ER work up    Love Ng MD              .  ..  "

## 2022-07-14 NOTE — PROGRESS NOTES
Walk test performed.   97% on 6LPM  86% with activity on 4LPM. Recovered at 93% on 6LPM at rest.  88% with activity on 6LPM Increased to 8LPM and recovered at 95% at rest.     Brittany Nuñez RN

## 2022-07-14 NOTE — PROGRESS NOTES
Social Work Progress Note      Patient Name:  Wanda Kaur  /Age:  1945 (76 year old)    Reason for Follow-Up:  Hospice referral    Intervention:  LITA faxed over hospice referral to [facility] (fax: [phone]).      Plan:  No further needs identified at this time.       Shyla Alexander MSW, LICSW  Direct Phone: [phone]

## 2022-07-28 NOTE — TELEPHONE ENCOUNTER
Spoke with patient.     She hasn't seen a difference in her sob, respirations or sats being on the Prednisone taper.     She inquires if the Prednisone has an adverse effect on her breathing.     Patient reports being on 6LPM with activity. Will titrate down at rest depending on sats.   Notices that even at rest her respirations are 40.     Reviewed with patient that here at the office visit with the walk test at 8LPM with activity she was still dropping her sats.  Advised that she titrate her oxygen to help keep sats above 88%.   Increase further with activity.

## 2022-07-28 NOTE — TELEPHONE ENCOUNTER
Huddled with Dr. Ng - Ok to continue on Prednisone. Do not think it is causing an adverse effect.   Patient can titrate her oxygen up to help keep her oxygen levels above 88%.     Updated patient. She agrees with plan.   She increased her oxygen to 8LPM and took a shower. She felt that this helped her.   She will continue on 8LPM today.

## 2022-08-04 NOTE — TELEPHONE ENCOUNTER
PLAN: Need to resend the 7/14/2022 to [facility] pharmacy for the remaining Prednsione taper.   According to patient she is starting on the 20mg tabs today.   Resent rx with remaining taper.     Patient calling to report she is out of Prednisone.     On 7/14/2022 Dr. Ng rx'd a Prednisone taper.   Patient picked up #30 tabs from that rx on 7/15 at [facility].   Patient had Prednisone left over from a previous rx.   She has used all of her Prednisone.     Contacted [facility], as [facility] reported that the remaining 7/14 Prednisone rx was transferred there on 7/26.   [facility] reports they do not have the rx on file.

## 2022-08-11 NOTE — TELEPHONE ENCOUNTER
Reason for Disposition    SEVERE difficulty breathing (e.g., struggling for each breath, speaks in single words, pulse > 120)    Slow, shallow and weak breathing    Breathing difficulty is main concern    SEVERE difficulty breathing (e.g., struggling for each breath, speaks in single words)    Slow, shallow and weak breathing    Protocols used: OXYGEN MONITORING AND [id number], BREATHING DIFFICULTY-A-AH

## 2022-08-11 NOTE — TELEPHONE ENCOUNTER
Patient called in respiratory distress.  She reports that her current O2 is at 92%.  She is currently on Liquid O2 running at 9L.    When she gets up to go to the bathroom he O2 drops into the 60s.  She then becomes dizzy and gets a headache.  She will then sit down and concentrate on her breathing and her O2 will come back up within 4-5 minutes.    Her breathing has gotten much worst over the last week, especially over the last 2 days.  She is unsure if this is part of the disease process or if there is something going on.  She is on the last couple days of the 20mg of Prednisone and will then decrease to 10mg daily.    Patient has been using her inhalers often.  She has been taking her Morphine as prescribed every 6 hours.  She has been taking her Ativan at night.  She mentioned she could use her nebulizer more often.  She plans to use her nebulizer right now after getting off the phone.    Highly recommended that patient go to ED now for evaluation.  Patient does not think that is necessary at this time.  She said she would continue to monitor her O2 and be sure to not do anything strenuous.     Patient is wondering if a respiratory therapist could see her to help her with her breathing difficulties?  Is this part of the disease and the progression of it?  Is there anything she can do to help ease the distress she feels with breathing?

## 2022-08-12 NOTE — TELEPHONE ENCOUNTER
Updated patient on below.   She agrees with plan.   She has her Hospice RN coming in a few minutes.

## 2022-08-12 NOTE — TELEPHONE ENCOUNTER
OK for respiratory therapy if that is available for home visits.  Has hospice started or come to the house?  I think having them come and increase her medications/oxygen would be most appropriate at this time.  I think the change in symptoms is part of her natural disease progression.   If she thinks the higher doses of prednisone are helpful, it is fine to take the dose at which she feels best and I am happy to order it.    Love Ng MD

## 2022-08-25 ENCOUNTER — TELEPHONE (OUTPATIENT)
Dept: PEDIATRICS | Facility: CLINIC | Age: 77
End: 2022-08-25

## 2022-08-25 NOTE — TELEPHONE ENCOUNTER
[facility] inform [facility] that patient passed away yesterday morning. Number to call back to Hospice RN if needed: Ph: [phone].       Lux LOWERY RN

## 2022-11-03 ENCOUNTER — MEDICAL CORRESPONDENCE (OUTPATIENT)
Dept: HEALTH INFORMATION MANAGEMENT | Facility: CLINIC | Age: 77
End: 2022-11-03

## 2022-11-04 ENCOUNTER — TELEPHONE (OUTPATIENT)
Dept: PEDIATRICS | Facility: CLINIC | Age: 77
End: 2022-11-04

## 2023-06-21 NOTE — LETTER
1/6/2021         RE: Wanda Kaur  [address] [place] [address]        Dear Colleague,    Thank you for referring your patient, Wanda Kaur, to the [facility]. Please see a copy of my visit note below.    Wanda Kaur is a 75 year old female who is being evaluated via a billable video visit.      How would you like to obtain your AVS? MyChart  If the video visit is dropped, the invitation should be resent by: Other e-mail: [employer]  Will anyone else be joining your video visit? No                                           Video-Visit Details    Type of service:  Video Visit    Video End Time: 10:45    Total video time was 25 minutes    Distant Location (provider location):  [facility]         HISTORY OF PRESENT ILLNESS:  The patient is a 73-year-old female with interstitial lung disease of unclear etiology, thought to be an ILD related to seronegative rheumatoid arthritis less likely IPF.  In 2016, she had an exacerbation of ILD possibly related to pneumonia and was hospitalized for a prolonged period of time.  In the aftermath of that she was placed on CellCept and prednisone and felt terrible while being on these medications and self-tapered herself off of them. I began following her in 2018.  Her last visit with me was in August 2020. She has noted increasing dyspnea. Gets SOB with ADL.  Her cough is about the same. The Prednisone burst and taper may have helped slightly.  Talking or activity triggers coughing.  She has been using an albuterol inhaler which seems to help that in addition to her oxygen therapy.  She up to 6-8  liters O2 with activity.     REVIEW OF SYSTEMS   CARDIAC:  She denies exertional chest pain, syncope, dizziness or palpitations.   GASTROINTESTINAL:  She denies reflux or heartburn.   MUSCULOSKELETAL:  She has obvious arthritic  changes in her hands but does not think she has felt to have active arthritis in years.      Imaging: Slight progression of ILD compared to 2018 scan.      PFTs:  FEV1 0.90 and FVC 1.01 in Dec 2020.    In August 2020 PFTs were: FEV1 .90 and FVC 1.14. Slightly lower than 9/2019 pfts. In 2019   Her FEV1 was 0.91 - 50% of predicted, FVC 1.24 or 54% of predicted and diffusion capacity 52% of predicted.  All of these are about roughly the same as her last few visits with me.  Of note, she did have a chest CT scan in the outside (10/2019), which suggests that there has been some mild progression of her pulmonary fibrosis.      ASSESSMENT AND PLAN:  Wanda Kaur 73-year-old woman with interstitial lung disease, the etiology is not clear.  Her CT scan suggestive of fibrotic NSIP and she has previously been diagnosed with seronegative rheumatoid arthritis.  It seems most likely diagnosis is RA related ILD.  IPF remains a remote possibility, but we think that is less likely given that she carries a diagnosis of seronegative rheumatoid arthritis.  In the past she has not tolerated CellCept and prednisone and we had a long discussion regarding these medications and currently she is in favor of not being treated with them.  We also briefly discussed the issue of trying profibrotic agents, either Esbriet or Ofev and she was not interested in those medications either.      Continue with the albuterol inhaler and Serevent, a long-acting bronchodilator. And inhaled ICS.  Will try Tessalon Pearls. Patient is interested in going through pulmonary rehab again. Unclear if the programs are active at this time due to the surge in covid. Will call to see if the program is active.  RTC in 4-5 months with 6 MWT.       Fernando Goetz MD  Pulmonary/Critical Care  January 6, 2021 10:19 AM           (V5) oriented

## (undated) DEVICE — SOL NACL 0.9% IRRIG 1000ML BOTTLE 07138-09

## (undated) DEVICE — TAPE DRSG UNIVERSAL CLOTH 3" WHITE LATEX 881-3

## (undated) DEVICE — NDL ANGIOCATH 14GA 2" 4088

## (undated) DEVICE — PIN SKULL MAYFIELD ADULT TITANIUM 3/PK A1120

## (undated) DEVICE — GLOVE PROTEXIS POWDER FREE 9.0 ORTHOPEDIC 2D73ET90

## (undated) DEVICE — SYR 20ML LL W/O NDL 302830

## (undated) DEVICE — NDL 18GA 1.5" 305196

## (undated) DEVICE — PACK SPINE SM CUSTOM SNE15SSFSK

## (undated) DEVICE — SPONGE KITTNER 31001010

## (undated) DEVICE — DRAPE SHEET REV FOLD 3/4 9349

## (undated) DEVICE — DRSG ADAPTIC 3X8" 6113

## (undated) DEVICE — SPONGE COTTONOID 1/2X3" 80-1407

## (undated) DEVICE — DRSG STERI STRIP 1/2X4" R1547

## (undated) DEVICE — DRAPE POUCH IRR 1016

## (undated) DEVICE — ESU GROUND PAD UNIVERSAL W/O CORD

## (undated) DEVICE — GLOVE PROTEXIS W/NEU-THERA 9.0  2D73TE90

## (undated) DEVICE — GLOVE PROTEXIS BLUE W/NEU-THERA 7.5  2D73EB75

## (undated) DEVICE — SU VICRYL 0 CT-1 CR 8X18" J740D

## (undated) DEVICE — PEN MARKING SKIN

## (undated) DEVICE — SYR 20ML LL

## (undated) DEVICE — DRAIN ROUND W/RESERV KIT JACKSON PRATT 10FR 400ML SU130-402D

## (undated) DEVICE — DECANTER BAG 2002S

## (undated) DEVICE — SU VICRYL 2-0 CP-1 27" UND J266H

## (undated) DEVICE — STPL SKIN 35W APPOSE 8886803712

## (undated) DEVICE — SU PROLENE 3-0 FS-1 18" 8684G

## (undated) DEVICE — DRSG KERLIX FLUFFS X5

## (undated) DEVICE — SYR 30ML LL W/O NDL

## (undated) DEVICE — SOL NACL 0.9% INJ 250ML BAG 2B1322Q

## (undated) DEVICE — DRAPE IOBAN INCISE 23X17" 6650EZ

## (undated) DEVICE — DRAPE MAYO STAND 23X54 8337

## (undated) DEVICE — SPONGE SURGIFOAM 100 1974

## (undated) DEVICE — TAPE MEDIPORE 6"X10YD 2966

## (undated) DEVICE — MIDAS REX DISSECTING TOOL  14MH30

## (undated) DEVICE — LINEN TOWEL PACK X5 5464

## (undated) DEVICE — SU VICRYL 0 CT 36" J358H

## (undated) DEVICE — SPONGE SURGIFOAM 01GM POWDER 1978

## (undated) DEVICE — DRAPE STERI TOWEL SM 1000

## (undated) DEVICE — PREP DURAPREP 06ML APL 8635

## (undated) DEVICE — DRSG GAUZE 4X4" 3033

## (undated) DEVICE — ESU CORD BIPOLAR 12' E0509

## (undated) DEVICE — SPONGE RAY-TEC 4X8" 7318

## (undated) DEVICE — SUCTION MANIFOLD NEPTUNE SGL

## (undated) DEVICE — SPONGE BALL KERLIX ROUND XL W/O STRING LATEX 4935

## (undated) DEVICE — GLOVE PROTEXIS W/NEU-THERA 7.0  2D73TE70

## (undated) RX ORDER — PROPOFOL 10 MG/ML
INJECTION, EMULSION INTRAVENOUS
Status: DISPENSED
Start: 2018-04-09

## (undated) RX ORDER — FENTANYL CITRATE 50 UG/ML
INJECTION, SOLUTION INTRAMUSCULAR; INTRAVENOUS
Status: DISPENSED
Start: 2018-04-09

## (undated) RX ORDER — HYDROMORPHONE HYDROCHLORIDE 1 MG/ML
INJECTION, SOLUTION INTRAMUSCULAR; INTRAVENOUS; SUBCUTANEOUS
Status: DISPENSED
Start: 2018-04-09

## (undated) RX ORDER — GINSENG 100 MG
CAPSULE ORAL
Status: DISPENSED
Start: 2018-04-09

## (undated) RX ORDER — ONDANSETRON 2 MG/ML
INJECTION INTRAMUSCULAR; INTRAVENOUS
Status: DISPENSED
Start: 2018-04-09

## (undated) RX ORDER — CEFAZOLIN SODIUM 2 G/100ML
INJECTION, SOLUTION INTRAVENOUS
Status: DISPENSED
Start: 2018-04-09

## (undated) RX ORDER — BUPIVACAINE HYDROCHLORIDE 2.5 MG/ML
INJECTION, SOLUTION EPIDURAL; INFILTRATION; INTRACAUDAL
Status: DISPENSED
Start: 2018-04-09

## (undated) RX ORDER — EPINEPHRINE 1 MG/ML
INJECTION, SOLUTION INTRAMUSCULAR; SUBCUTANEOUS
Status: DISPENSED
Start: 2018-04-09